# Patient Record
Sex: MALE | Race: WHITE | NOT HISPANIC OR LATINO | URBAN - METROPOLITAN AREA
[De-identification: names, ages, dates, MRNs, and addresses within clinical notes are randomized per-mention and may not be internally consistent; named-entity substitution may affect disease eponyms.]

---

## 2021-01-01 ENCOUNTER — EMERGENCY (EMERGENCY)
Facility: HOSPITAL | Age: 56
LOS: 1 days | Discharge: ANOTHER HOSPITAL | End: 2021-01-01
Attending: INTERNAL MEDICINE | Admitting: INTERNAL MEDICINE
Payer: COMMERCIAL

## 2021-01-01 ENCOUNTER — INPATIENT (INPATIENT)
Facility: HOSPITAL | Age: 56
LOS: 10 days | DRG: 64 | End: 2021-08-24
Attending: THORACIC SURGERY (CARDIOTHORACIC VASCULAR SURGERY) | Admitting: THORACIC SURGERY (CARDIOTHORACIC VASCULAR SURGERY)
Payer: COMMERCIAL

## 2021-01-01 VITALS
HEIGHT: 71 IN | RESPIRATION RATE: 17 BRPM | TEMPERATURE: 98 F | DIASTOLIC BLOOD PRESSURE: 229 MMHG | SYSTOLIC BLOOD PRESSURE: 126 MMHG | OXYGEN SATURATION: 99 % | WEIGHT: 171.52 LBS | HEART RATE: 66 BPM

## 2021-01-01 VITALS
SYSTOLIC BLOOD PRESSURE: 174 MMHG | DIASTOLIC BLOOD PRESSURE: 95 MMHG | TEMPERATURE: 99 F | HEART RATE: 71 BPM | OXYGEN SATURATION: 97 % | RESPIRATION RATE: 18 BRPM

## 2021-01-01 VITALS
HEART RATE: 59 BPM | DIASTOLIC BLOOD PRESSURE: 97 MMHG | SYSTOLIC BLOOD PRESSURE: 192 MMHG | RESPIRATION RATE: 17 BRPM | OXYGEN SATURATION: 100 %

## 2021-01-01 VITALS — HEART RATE: 32 BPM

## 2021-01-01 DIAGNOSIS — I71.00 DISSECTION OF UNSPECIFIED SITE OF AORTA: ICD-10-CM

## 2021-01-01 DIAGNOSIS — I61.9 NONTRAUMATIC INTRACEREBRAL HEMORRHAGE, UNSPECIFIED: ICD-10-CM

## 2021-01-01 LAB
-  AMPICILLIN/SULBACTAM: SIGNIFICANT CHANGE UP
-  CEFAZOLIN: SIGNIFICANT CHANGE UP
-  CLINDAMYCIN: SIGNIFICANT CHANGE UP
-  ERYTHROMYCIN: SIGNIFICANT CHANGE UP
-  GENTAMICIN: SIGNIFICANT CHANGE UP
-  OXACILLIN: SIGNIFICANT CHANGE UP
-  PENICILLIN: SIGNIFICANT CHANGE UP
-  RIFAMPIN: SIGNIFICANT CHANGE UP
-  STAPHYLOCOCCUS EPIDERMIDIS: SIGNIFICANT CHANGE UP
-  TETRACYCLINE: SIGNIFICANT CHANGE UP
-  TRIMETHOPRIM/SULFAMETHOXAZOLE: SIGNIFICANT CHANGE UP
-  VANCOMYCIN: SIGNIFICANT CHANGE UP
A1C WITH ESTIMATED AVERAGE GLUCOSE RESULT: 5.3 % — SIGNIFICANT CHANGE UP (ref 4–5.6)
ALBUMIN SERPL ELPH-MCNC: 2.4 G/DL — LOW (ref 3.3–5)
ALBUMIN SERPL ELPH-MCNC: 2.5 G/DL — LOW (ref 3.3–5)
ALBUMIN SERPL ELPH-MCNC: 2.6 G/DL — LOW (ref 3.3–5)
ALBUMIN SERPL ELPH-MCNC: 2.7 G/DL — LOW (ref 3.3–5)
ALBUMIN SERPL ELPH-MCNC: 2.8 G/DL — LOW (ref 3.3–5)
ALBUMIN SERPL ELPH-MCNC: 2.9 G/DL — LOW (ref 3.3–5)
ALBUMIN SERPL ELPH-MCNC: 3 G/DL — LOW (ref 3.3–5)
ALBUMIN SERPL ELPH-MCNC: 3.1 G/DL — LOW (ref 3.3–5)
ALBUMIN SERPL ELPH-MCNC: 3.1 G/DL — LOW (ref 3.3–5)
ALBUMIN SERPL ELPH-MCNC: 3.3 G/DL — SIGNIFICANT CHANGE UP (ref 3.3–5)
ALBUMIN SERPL ELPH-MCNC: 3.3 G/DL — SIGNIFICANT CHANGE UP (ref 3.3–5)
ALBUMIN SERPL ELPH-MCNC: 3.5 G/DL — SIGNIFICANT CHANGE UP (ref 3.3–5)
ALBUMIN SERPL ELPH-MCNC: 3.5 G/DL — SIGNIFICANT CHANGE UP (ref 3.3–5)
ALBUMIN SERPL ELPH-MCNC: 3.8 G/DL — SIGNIFICANT CHANGE UP (ref 3.3–5)
ALBUMIN SERPL ELPH-MCNC: 3.9 G/DL — SIGNIFICANT CHANGE UP (ref 3.3–5)
ALBUMIN SERPL ELPH-MCNC: 4.1 G/DL — SIGNIFICANT CHANGE UP (ref 3.3–5)
ALBUMIN SERPL ELPH-MCNC: 4.3 G/DL — SIGNIFICANT CHANGE UP (ref 3.3–5)
ALP SERPL-CCNC: 100 U/L — SIGNIFICANT CHANGE UP (ref 40–120)
ALP SERPL-CCNC: 101 U/L — SIGNIFICANT CHANGE UP (ref 40–120)
ALP SERPL-CCNC: 102 U/L — SIGNIFICANT CHANGE UP (ref 40–120)
ALP SERPL-CCNC: 106 U/L — SIGNIFICANT CHANGE UP (ref 40–120)
ALP SERPL-CCNC: 106 U/L — SIGNIFICANT CHANGE UP (ref 40–120)
ALP SERPL-CCNC: 107 U/L — SIGNIFICANT CHANGE UP (ref 40–120)
ALP SERPL-CCNC: 108 U/L — SIGNIFICANT CHANGE UP (ref 40–120)
ALP SERPL-CCNC: 113 U/L — SIGNIFICANT CHANGE UP (ref 40–120)
ALP SERPL-CCNC: 117 U/L — SIGNIFICANT CHANGE UP (ref 40–120)
ALP SERPL-CCNC: 126 U/L — HIGH (ref 40–120)
ALP SERPL-CCNC: 126 U/L — HIGH (ref 40–120)
ALP SERPL-CCNC: 137 U/L — HIGH (ref 40–120)
ALP SERPL-CCNC: 139 U/L — HIGH (ref 40–120)
ALP SERPL-CCNC: 141 U/L — HIGH (ref 40–120)
ALP SERPL-CCNC: 84 U/L — SIGNIFICANT CHANGE UP (ref 40–120)
ALP SERPL-CCNC: 89 U/L — SIGNIFICANT CHANGE UP (ref 40–120)
ALP SERPL-CCNC: 91 U/L — SIGNIFICANT CHANGE UP (ref 40–120)
ALP SERPL-CCNC: 92 U/L — SIGNIFICANT CHANGE UP (ref 40–120)
ALP SERPL-CCNC: 93 U/L — SIGNIFICANT CHANGE UP (ref 40–120)
ALP SERPL-CCNC: 94 U/L — SIGNIFICANT CHANGE UP (ref 40–120)
ALP SERPL-CCNC: 97 U/L — SIGNIFICANT CHANGE UP (ref 40–120)
ALP SERPL-CCNC: 98 U/L — SIGNIFICANT CHANGE UP (ref 40–120)
ALT FLD-CCNC: 14 U/L — SIGNIFICANT CHANGE UP (ref 10–45)
ALT FLD-CCNC: 15 U/L — SIGNIFICANT CHANGE UP (ref 10–45)
ALT FLD-CCNC: 15 U/L — SIGNIFICANT CHANGE UP (ref 10–45)
ALT FLD-CCNC: 16 U/L — SIGNIFICANT CHANGE UP (ref 10–45)
ALT FLD-CCNC: 17 U/L — SIGNIFICANT CHANGE UP (ref 10–45)
ALT FLD-CCNC: 17 U/L — SIGNIFICANT CHANGE UP (ref 10–45)
ALT FLD-CCNC: 18 U/L — SIGNIFICANT CHANGE UP (ref 10–45)
ALT FLD-CCNC: 19 U/L — SIGNIFICANT CHANGE UP (ref 10–45)
ALT FLD-CCNC: 22 U/L — SIGNIFICANT CHANGE UP (ref 10–45)
ALT FLD-CCNC: 31 U/L — SIGNIFICANT CHANGE UP (ref 10–45)
ALT FLD-CCNC: 34 U/L — SIGNIFICANT CHANGE UP (ref 10–45)
ALT FLD-CCNC: 35 U/L — SIGNIFICANT CHANGE UP (ref 10–45)
ALT FLD-CCNC: 43 U/L — SIGNIFICANT CHANGE UP (ref 10–45)
AMPHET UR-MCNC: POSITIVE
ANION GAP SERPL CALC-SCNC: 10 MMOL/L — SIGNIFICANT CHANGE UP (ref 5–17)
ANION GAP SERPL CALC-SCNC: 11 MMOL/L — SIGNIFICANT CHANGE UP (ref 5–17)
ANION GAP SERPL CALC-SCNC: 12 MMOL/L — SIGNIFICANT CHANGE UP (ref 5–17)
ANION GAP SERPL CALC-SCNC: 13 MMOL/L — SIGNIFICANT CHANGE UP (ref 5–17)
ANION GAP SERPL CALC-SCNC: 14 MMOL/L — SIGNIFICANT CHANGE UP (ref 5–17)
ANION GAP SERPL CALC-SCNC: 15 MMOL/L — SIGNIFICANT CHANGE UP (ref 5–17)
ANION GAP SERPL CALC-SCNC: 9 MMOL/L — SIGNIFICANT CHANGE UP (ref 5–17)
APPEARANCE UR: ABNORMAL
APPEARANCE UR: CLEAR — SIGNIFICANT CHANGE UP
APTT BLD: 28.5 SEC — SIGNIFICANT CHANGE UP (ref 27.5–35.5)
APTT BLD: 32 SEC — SIGNIFICANT CHANGE UP (ref 27.5–35.5)
APTT BLD: 35.6 SEC — HIGH (ref 27.5–35.5)
AST SERPL-CCNC: 13 U/L — SIGNIFICANT CHANGE UP (ref 10–40)
AST SERPL-CCNC: 15 U/L — SIGNIFICANT CHANGE UP (ref 10–40)
AST SERPL-CCNC: 16 U/L — SIGNIFICANT CHANGE UP (ref 10–40)
AST SERPL-CCNC: 17 U/L — SIGNIFICANT CHANGE UP (ref 10–40)
AST SERPL-CCNC: 20 U/L — SIGNIFICANT CHANGE UP (ref 10–40)
AST SERPL-CCNC: 20 U/L — SIGNIFICANT CHANGE UP (ref 10–40)
AST SERPL-CCNC: 21 U/L — SIGNIFICANT CHANGE UP (ref 10–40)
AST SERPL-CCNC: 22 U/L — SIGNIFICANT CHANGE UP (ref 10–40)
AST SERPL-CCNC: 23 U/L — SIGNIFICANT CHANGE UP (ref 10–40)
AST SERPL-CCNC: 23 U/L — SIGNIFICANT CHANGE UP (ref 10–40)
AST SERPL-CCNC: 24 U/L — SIGNIFICANT CHANGE UP (ref 10–40)
AST SERPL-CCNC: 25 U/L — SIGNIFICANT CHANGE UP (ref 10–40)
AST SERPL-CCNC: 26 U/L — SIGNIFICANT CHANGE UP (ref 10–40)
AST SERPL-CCNC: 26 U/L — SIGNIFICANT CHANGE UP (ref 10–40)
AST SERPL-CCNC: 27 U/L — SIGNIFICANT CHANGE UP (ref 10–40)
AST SERPL-CCNC: 28 U/L — SIGNIFICANT CHANGE UP (ref 10–40)
AST SERPL-CCNC: 30 U/L — SIGNIFICANT CHANGE UP (ref 10–40)
AST SERPL-CCNC: 31 U/L — SIGNIFICANT CHANGE UP (ref 10–40)
AST SERPL-CCNC: 35 U/L — SIGNIFICANT CHANGE UP (ref 10–40)
AST SERPL-CCNC: 37 U/L — SIGNIFICANT CHANGE UP (ref 10–40)
BACTERIA # UR AUTO: 0 — SIGNIFICANT CHANGE UP
BACTERIA # UR AUTO: NEGATIVE — SIGNIFICANT CHANGE UP
BARBITURATES UR SCN-MCNC: NEGATIVE — SIGNIFICANT CHANGE UP
BASE EXCESS BLDV CALC-SCNC: 2.9 MMOL/L — HIGH (ref -2–2)
BASE EXCESS BLDV CALC-SCNC: 3.3 MMOL/L — HIGH (ref -2–3)
BASOPHILS # BLD AUTO: 0.06 K/UL — SIGNIFICANT CHANGE UP (ref 0–0.2)
BASOPHILS # BLD AUTO: 0.07 K/UL — SIGNIFICANT CHANGE UP (ref 0–0.2)
BASOPHILS # BLD AUTO: 0.08 K/UL — SIGNIFICANT CHANGE UP (ref 0–0.2)
BASOPHILS # BLD AUTO: 0.08 K/UL — SIGNIFICANT CHANGE UP (ref 0–0.2)
BASOPHILS NFR BLD AUTO: 0.4 % — SIGNIFICANT CHANGE UP (ref 0–2)
BASOPHILS NFR BLD AUTO: 0.5 % — SIGNIFICANT CHANGE UP (ref 0–2)
BASOPHILS NFR BLD AUTO: 0.5 % — SIGNIFICANT CHANGE UP (ref 0–2)
BASOPHILS NFR BLD AUTO: 0.7 % — SIGNIFICANT CHANGE UP (ref 0–2)
BENZODIAZ UR-MCNC: NEGATIVE — SIGNIFICANT CHANGE UP
BILIRUB SERPL-MCNC: 0.4 MG/DL — SIGNIFICANT CHANGE UP (ref 0.2–1.2)
BILIRUB SERPL-MCNC: 0.5 MG/DL — SIGNIFICANT CHANGE UP (ref 0.2–1.2)
BILIRUB SERPL-MCNC: 0.6 MG/DL — SIGNIFICANT CHANGE UP (ref 0.2–1.2)
BILIRUB SERPL-MCNC: 0.7 MG/DL — SIGNIFICANT CHANGE UP (ref 0.2–1.2)
BILIRUB SERPL-MCNC: 0.8 MG/DL — SIGNIFICANT CHANGE UP (ref 0.2–1.2)
BILIRUB UR-MCNC: NEGATIVE — SIGNIFICANT CHANGE UP
BILIRUB UR-MCNC: NEGATIVE — SIGNIFICANT CHANGE UP
BLD GP AB SCN SERPL QL: NEGATIVE — SIGNIFICANT CHANGE UP
BLD GP AB SCN SERPL QL: NEGATIVE — SIGNIFICANT CHANGE UP
BUN SERPL-MCNC: 17 MG/DL — SIGNIFICANT CHANGE UP (ref 7–23)
BUN SERPL-MCNC: 18 MG/DL — SIGNIFICANT CHANGE UP (ref 7–23)
BUN SERPL-MCNC: 18 MG/DL — SIGNIFICANT CHANGE UP (ref 7–23)
BUN SERPL-MCNC: 19 MG/DL — SIGNIFICANT CHANGE UP (ref 7–23)
BUN SERPL-MCNC: 21 MG/DL — SIGNIFICANT CHANGE UP (ref 7–23)
BUN SERPL-MCNC: 24 MG/DL — HIGH (ref 7–23)
BUN SERPL-MCNC: 26 MG/DL — HIGH (ref 7–23)
BUN SERPL-MCNC: 27 MG/DL — HIGH (ref 7–23)
BUN SERPL-MCNC: 27 MG/DL — HIGH (ref 7–23)
BUN SERPL-MCNC: 29 MG/DL — HIGH (ref 7–23)
BUN SERPL-MCNC: 30 MG/DL — HIGH (ref 7–23)
BUN SERPL-MCNC: 35 MG/DL — HIGH (ref 7–23)
BUN SERPL-MCNC: 36 MG/DL — HIGH (ref 7–23)
BUN SERPL-MCNC: 36 MG/DL — HIGH (ref 7–23)
BUN SERPL-MCNC: 38 MG/DL — HIGH (ref 7–23)
BUN SERPL-MCNC: 39 MG/DL — HIGH (ref 7–23)
BUN SERPL-MCNC: 40 MG/DL — HIGH (ref 7–23)
BUN SERPL-MCNC: 40 MG/DL — HIGH (ref 7–23)
BUN SERPL-MCNC: 41 MG/DL — HIGH (ref 7–23)
BUN SERPL-MCNC: 42 MG/DL — HIGH (ref 7–23)
CA-I SERPL-SCNC: 1.14 MMOL/L — SIGNIFICANT CHANGE UP (ref 1.12–1.3)
CALCIUM SERPL-MCNC: 8.7 MG/DL — SIGNIFICANT CHANGE UP (ref 8.4–10.5)
CALCIUM SERPL-MCNC: 8.7 MG/DL — SIGNIFICANT CHANGE UP (ref 8.4–10.5)
CALCIUM SERPL-MCNC: 8.8 MG/DL — SIGNIFICANT CHANGE UP (ref 8.4–10.5)
CALCIUM SERPL-MCNC: 8.9 MG/DL — SIGNIFICANT CHANGE UP (ref 8.4–10.5)
CALCIUM SERPL-MCNC: 9 MG/DL — SIGNIFICANT CHANGE UP (ref 8.4–10.5)
CALCIUM SERPL-MCNC: 9 MG/DL — SIGNIFICANT CHANGE UP (ref 8.4–10.5)
CALCIUM SERPL-MCNC: 9.1 MG/DL — SIGNIFICANT CHANGE UP (ref 8.4–10.5)
CALCIUM SERPL-MCNC: 9.2 MG/DL — SIGNIFICANT CHANGE UP (ref 8.4–10.5)
CALCIUM SERPL-MCNC: 9.3 MG/DL — SIGNIFICANT CHANGE UP (ref 8.4–10.5)
CALCIUM SERPL-MCNC: 9.4 MG/DL — SIGNIFICANT CHANGE UP (ref 8.4–10.5)
CALCIUM SERPL-MCNC: 9.4 MG/DL — SIGNIFICANT CHANGE UP (ref 8.4–10.5)
CHLORIDE BLDV-SCNC: 104 MMOL/L — SIGNIFICANT CHANGE UP (ref 96–108)
CHLORIDE SERPL-SCNC: 102 MMOL/L — SIGNIFICANT CHANGE UP (ref 96–108)
CHLORIDE SERPL-SCNC: 104 MMOL/L — SIGNIFICANT CHANGE UP (ref 96–108)
CHLORIDE SERPL-SCNC: 104 MMOL/L — SIGNIFICANT CHANGE UP (ref 96–108)
CHLORIDE SERPL-SCNC: 105 MMOL/L — SIGNIFICANT CHANGE UP (ref 96–108)
CHLORIDE SERPL-SCNC: 107 MMOL/L — SIGNIFICANT CHANGE UP (ref 96–108)
CHLORIDE SERPL-SCNC: 109 MMOL/L — HIGH (ref 96–108)
CHLORIDE SERPL-SCNC: 112 MMOL/L — HIGH (ref 96–108)
CHLORIDE SERPL-SCNC: 114 MMOL/L — HIGH (ref 96–108)
CHLORIDE SERPL-SCNC: 114 MMOL/L — HIGH (ref 96–108)
CHLORIDE SERPL-SCNC: 116 MMOL/L — HIGH (ref 96–108)
CHLORIDE SERPL-SCNC: 117 MMOL/L — HIGH (ref 96–108)
CHLORIDE SERPL-SCNC: 117 MMOL/L — HIGH (ref 96–108)
CHLORIDE SERPL-SCNC: 119 MMOL/L — HIGH (ref 96–108)
CHLORIDE SERPL-SCNC: 120 MMOL/L — HIGH (ref 96–108)
CHLORIDE SERPL-SCNC: 122 MMOL/L — HIGH (ref 96–108)
CHLORIDE SERPL-SCNC: 122 MMOL/L — HIGH (ref 96–108)
CHLORIDE SERPL-SCNC: 124 MMOL/L — HIGH (ref 96–108)
CHOLEST SERPL-MCNC: 169 MG/DL — SIGNIFICANT CHANGE UP
CK MB BLD-MCNC: 2.7 % — SIGNIFICANT CHANGE UP (ref 0–3.5)
CK MB CFR SERPL CALC: 15.1 NG/ML — HIGH (ref 0–6.7)
CK SERPL-CCNC: 556 U/L — HIGH (ref 30–200)
CLOSURE TME COLL+EPINEP BLD: 189 K/UL — SIGNIFICANT CHANGE UP (ref 150–400)
CO2 BLDV-SCNC: 29 MMOL/L — HIGH (ref 22–26)
CO2 BLDV-SCNC: 30 MMOL/L — SIGNIFICANT CHANGE UP (ref 22–30)
CO2 SERPL-SCNC: 17 MMOL/L — LOW (ref 22–31)
CO2 SERPL-SCNC: 18 MMOL/L — LOW (ref 22–31)
CO2 SERPL-SCNC: 19 MMOL/L — LOW (ref 22–31)
CO2 SERPL-SCNC: 20 MMOL/L — LOW (ref 22–31)
CO2 SERPL-SCNC: 21 MMOL/L — LOW (ref 22–31)
CO2 SERPL-SCNC: 23 MMOL/L — SIGNIFICANT CHANGE UP (ref 22–31)
CO2 SERPL-SCNC: 24 MMOL/L — SIGNIFICANT CHANGE UP (ref 22–31)
CO2 SERPL-SCNC: 27 MMOL/L — SIGNIFICANT CHANGE UP (ref 22–31)
COCAINE METAB.OTHER UR-MCNC: NEGATIVE — SIGNIFICANT CHANGE UP
COLOR SPEC: SIGNIFICANT CHANGE UP
COLOR SPEC: YELLOW — SIGNIFICANT CHANGE UP
CREAT ?TM UR-MCNC: 50 MG/DL — SIGNIFICANT CHANGE UP
CREAT SERPL-MCNC: 1.4 MG/DL — HIGH (ref 0.5–1.3)
CREAT SERPL-MCNC: 1.4 MG/DL — HIGH (ref 0.5–1.3)
CREAT SERPL-MCNC: 1.45 MG/DL — HIGH (ref 0.5–1.3)
CREAT SERPL-MCNC: 1.47 MG/DL — HIGH (ref 0.5–1.3)
CREAT SERPL-MCNC: 1.53 MG/DL — HIGH (ref 0.5–1.3)
CREAT SERPL-MCNC: 1.57 MG/DL — HIGH (ref 0.5–1.3)
CREAT SERPL-MCNC: 1.59 MG/DL — HIGH (ref 0.5–1.3)
CREAT SERPL-MCNC: 1.6 MG/DL — HIGH (ref 0.5–1.3)
CREAT SERPL-MCNC: 1.67 MG/DL — HIGH (ref 0.5–1.3)
CREAT SERPL-MCNC: 1.68 MG/DL — HIGH (ref 0.5–1.3)
CREAT SERPL-MCNC: 1.68 MG/DL — HIGH (ref 0.5–1.3)
CREAT SERPL-MCNC: 1.71 MG/DL — HIGH (ref 0.5–1.3)
CREAT SERPL-MCNC: 1.73 MG/DL — HIGH (ref 0.5–1.3)
CREAT SERPL-MCNC: 1.75 MG/DL — HIGH (ref 0.5–1.3)
CREAT SERPL-MCNC: 1.81 MG/DL — HIGH (ref 0.5–1.3)
CREAT SERPL-MCNC: 1.81 MG/DL — HIGH (ref 0.5–1.3)
CREAT SERPL-MCNC: 1.89 MG/DL — HIGH (ref 0.5–1.3)
CREAT SERPL-MCNC: 1.97 MG/DL — HIGH (ref 0.5–1.3)
CREAT SERPL-MCNC: 2.02 MG/DL — HIGH (ref 0.5–1.3)
CREAT SERPL-MCNC: 2.22 MG/DL — HIGH (ref 0.5–1.3)
CREAT SERPL-MCNC: 2.23 MG/DL — HIGH (ref 0.5–1.3)
CREAT SERPL-MCNC: 2.24 MG/DL — HIGH (ref 0.5–1.3)
CREAT SERPL-MCNC: 2.29 MG/DL — HIGH (ref 0.5–1.3)
CREAT SERPL-MCNC: 2.76 MG/DL — HIGH (ref 0.5–1.3)
CULTURE RESULTS: SIGNIFICANT CHANGE UP
DIFF PNL FLD: ABNORMAL
DIFF PNL FLD: NEGATIVE — SIGNIFICANT CHANGE UP
DRUG SCREEN, SERUM: SIGNIFICANT CHANGE UP
EOSINOPHIL # BLD AUTO: 0.05 K/UL — SIGNIFICANT CHANGE UP (ref 0–0.5)
EOSINOPHIL # BLD AUTO: 0.06 K/UL — SIGNIFICANT CHANGE UP (ref 0–0.5)
EOSINOPHIL # BLD AUTO: 0.21 K/UL — SIGNIFICANT CHANGE UP (ref 0–0.5)
EOSINOPHIL # BLD AUTO: 0.22 K/UL — SIGNIFICANT CHANGE UP (ref 0–0.5)
EOSINOPHIL NFR BLD AUTO: 0.2 % — SIGNIFICANT CHANGE UP (ref 0–6)
EOSINOPHIL NFR BLD AUTO: 0.5 % — SIGNIFICANT CHANGE UP (ref 0–6)
EOSINOPHIL NFR BLD AUTO: 1.5 % — SIGNIFICANT CHANGE UP (ref 0–6)
EOSINOPHIL NFR BLD AUTO: 1.8 % — SIGNIFICANT CHANGE UP (ref 0–6)
EPI CELLS # UR: 0 /HPF — SIGNIFICANT CHANGE UP
EPI CELLS # UR: 10 /HPF — HIGH
ESTIMATED AVERAGE GLUCOSE: 105 MG/DL — SIGNIFICANT CHANGE UP (ref 68–114)
ETHANOL SERPL-MCNC: <3 MG/DL — SIGNIFICANT CHANGE UP (ref 0–3)
FIBRINOGEN PPP-MCNC: 575 MG/DL — HIGH (ref 290–520)
GAS PNL BLDA: SIGNIFICANT CHANGE UP
GAS PNL BLDV: 137 MMOL/L — SIGNIFICANT CHANGE UP (ref 135–145)
GAS PNL BLDV: SIGNIFICANT CHANGE UP
GAS PNL BLDV: SIGNIFICANT CHANGE UP
GLUCOSE BLDC GLUCOMTR-MCNC: 122 MG/DL — HIGH (ref 70–99)
GLUCOSE BLDV-MCNC: 119 MG/DL — HIGH (ref 70–99)
GLUCOSE SERPL-MCNC: 103 MG/DL — HIGH (ref 70–99)
GLUCOSE SERPL-MCNC: 118 MG/DL — HIGH (ref 70–99)
GLUCOSE SERPL-MCNC: 118 MG/DL — HIGH (ref 70–99)
GLUCOSE SERPL-MCNC: 122 MG/DL — HIGH (ref 70–99)
GLUCOSE SERPL-MCNC: 124 MG/DL — HIGH (ref 70–99)
GLUCOSE SERPL-MCNC: 124 MG/DL — HIGH (ref 70–99)
GLUCOSE SERPL-MCNC: 127 MG/DL — HIGH (ref 70–99)
GLUCOSE SERPL-MCNC: 127 MG/DL — HIGH (ref 70–99)
GLUCOSE SERPL-MCNC: 128 MG/DL — HIGH (ref 70–99)
GLUCOSE SERPL-MCNC: 133 MG/DL — HIGH (ref 70–99)
GLUCOSE SERPL-MCNC: 134 MG/DL — HIGH (ref 70–99)
GLUCOSE SERPL-MCNC: 138 MG/DL — HIGH (ref 70–99)
GLUCOSE SERPL-MCNC: 140 MG/DL — HIGH (ref 70–99)
GLUCOSE SERPL-MCNC: 142 MG/DL — HIGH (ref 70–99)
GLUCOSE SERPL-MCNC: 142 MG/DL — HIGH (ref 70–99)
GLUCOSE SERPL-MCNC: 144 MG/DL — HIGH (ref 70–99)
GLUCOSE SERPL-MCNC: 144 MG/DL — HIGH (ref 70–99)
GLUCOSE SERPL-MCNC: 151 MG/DL — HIGH (ref 70–99)
GLUCOSE SERPL-MCNC: 152 MG/DL — HIGH (ref 70–99)
GLUCOSE SERPL-MCNC: 158 MG/DL — HIGH (ref 70–99)
GLUCOSE SERPL-MCNC: 172 MG/DL — HIGH (ref 70–99)
GLUCOSE UR QL: ABNORMAL
GLUCOSE UR QL: NEGATIVE — SIGNIFICANT CHANGE UP
GRAM STN FLD: SIGNIFICANT CHANGE UP
HCO3 BLDV-SCNC: 28 MMOL/L — SIGNIFICANT CHANGE UP (ref 21–29)
HCO3 BLDV-SCNC: 28 MMOL/L — SIGNIFICANT CHANGE UP (ref 22–29)
HCT VFR BLD CALC: 30.6 % — LOW (ref 39–50)
HCT VFR BLD CALC: 31.1 % — LOW (ref 39–50)
HCT VFR BLD CALC: 31.4 % — LOW (ref 39–50)
HCT VFR BLD CALC: 31.5 % — LOW (ref 39–50)
HCT VFR BLD CALC: 32.5 % — LOW (ref 39–50)
HCT VFR BLD CALC: 33.6 % — LOW (ref 39–50)
HCT VFR BLD CALC: 34.5 % — LOW (ref 39–50)
HCT VFR BLD CALC: 37.2 % — LOW (ref 39–50)
HCT VFR BLD CALC: 37.4 % — LOW (ref 39–50)
HCT VFR BLD CALC: 37.4 % — LOW (ref 39–50)
HCT VFR BLD CALC: 39.3 % — SIGNIFICANT CHANGE UP (ref 39–50)
HCT VFR BLD CALC: 41.2 % — SIGNIFICANT CHANGE UP (ref 39–50)
HCT VFR BLD CALC: 42.1 % — SIGNIFICANT CHANGE UP (ref 39–50)
HCT VFR BLD CALC: 42.8 % — SIGNIFICANT CHANGE UP (ref 39–50)
HCT VFR BLDA CALC: 45 % — SIGNIFICANT CHANGE UP (ref 39–50)
HDLC SERPL-MCNC: 89 MG/DL — SIGNIFICANT CHANGE UP
HEPARINASE TEG R TIME: 5.9 MIN — SIGNIFICANT CHANGE UP (ref 4.3–8.3)
HGB BLD CALC-MCNC: 14.7 G/DL — SIGNIFICANT CHANGE UP (ref 13–17)
HGB BLD-MCNC: 10 G/DL — LOW (ref 13–17)
HGB BLD-MCNC: 11 G/DL — LOW (ref 13–17)
HGB BLD-MCNC: 11.4 G/DL — LOW (ref 13–17)
HGB BLD-MCNC: 12.2 G/DL — LOW (ref 13–17)
HGB BLD-MCNC: 12.5 G/DL — LOW (ref 13–17)
HGB BLD-MCNC: 12.8 G/DL — LOW (ref 13–17)
HGB BLD-MCNC: 13.5 G/DL — SIGNIFICANT CHANGE UP (ref 13–17)
HGB BLD-MCNC: 14.2 G/DL — SIGNIFICANT CHANGE UP (ref 13–17)
HGB BLD-MCNC: 14.6 G/DL — SIGNIFICANT CHANGE UP (ref 13–17)
HGB BLD-MCNC: 14.7 G/DL — SIGNIFICANT CHANGE UP (ref 13–17)
HGB BLD-MCNC: 9.4 G/DL — LOW (ref 13–17)
HGB BLD-MCNC: 9.7 G/DL — LOW (ref 13–17)
HGB BLD-MCNC: 9.7 G/DL — LOW (ref 13–17)
HGB BLD-MCNC: 9.8 G/DL — LOW (ref 13–17)
HYALINE CASTS # UR AUTO: 0 /LPF — SIGNIFICANT CHANGE UP (ref 0–2)
HYALINE CASTS # UR AUTO: 106 /LPF — HIGH (ref 0–2)
IMM GRANULOCYTES NFR BLD AUTO: 0.2 % — SIGNIFICANT CHANGE UP (ref 0–1.5)
IMM GRANULOCYTES NFR BLD AUTO: 0.3 % — SIGNIFICANT CHANGE UP (ref 0–1.5)
IMM GRANULOCYTES NFR BLD AUTO: 0.5 % — SIGNIFICANT CHANGE UP (ref 0–1.5)
IMM GRANULOCYTES NFR BLD AUTO: 1 % — SIGNIFICANT CHANGE UP (ref 0–1.5)
INR BLD: 0.9 RATIO — SIGNIFICANT CHANGE UP (ref 0.88–1.16)
INR BLD: 1.04 RATIO — SIGNIFICANT CHANGE UP (ref 0.88–1.16)
INR BLD: 1.1 RATIO — SIGNIFICANT CHANGE UP (ref 0.88–1.16)
KETONES UR-MCNC: NEGATIVE — SIGNIFICANT CHANGE UP
KETONES UR-MCNC: NEGATIVE — SIGNIFICANT CHANGE UP
LACTATE BLDV-MCNC: 1.3 MMOL/L — SIGNIFICANT CHANGE UP (ref 0.7–2)
LEUKOCYTE ESTERASE UR-ACNC: ABNORMAL
LEUKOCYTE ESTERASE UR-ACNC: NEGATIVE — SIGNIFICANT CHANGE UP
LIPID PNL WITH DIRECT LDL SERPL: 68 MG/DL — SIGNIFICANT CHANGE UP
LYMPHOCYTES # BLD AUTO: 1 K/UL — SIGNIFICANT CHANGE UP (ref 1–3.3)
LYMPHOCYTES # BLD AUTO: 1.37 K/UL — SIGNIFICANT CHANGE UP (ref 1–3.3)
LYMPHOCYTES # BLD AUTO: 1.79 K/UL — SIGNIFICANT CHANGE UP (ref 1–3.3)
LYMPHOCYTES # BLD AUTO: 10.3 % — LOW (ref 13–44)
LYMPHOCYTES # BLD AUTO: 12.4 % — LOW (ref 13–44)
LYMPHOCYTES # BLD AUTO: 19.4 % — SIGNIFICANT CHANGE UP (ref 13–44)
LYMPHOCYTES # BLD AUTO: 2.28 K/UL — SIGNIFICANT CHANGE UP (ref 1–3.3)
LYMPHOCYTES # BLD AUTO: 4.5 % — LOW (ref 13–44)
MAGNESIUM SERPL-MCNC: 2.1 MG/DL — SIGNIFICANT CHANGE UP (ref 1.6–2.6)
MAGNESIUM SERPL-MCNC: 2.2 MG/DL — SIGNIFICANT CHANGE UP (ref 1.6–2.6)
MAGNESIUM SERPL-MCNC: 2.2 MG/DL — SIGNIFICANT CHANGE UP (ref 1.6–2.6)
MAGNESIUM SERPL-MCNC: 2.4 MG/DL — SIGNIFICANT CHANGE UP (ref 1.6–2.6)
MAGNESIUM SERPL-MCNC: 2.5 MG/DL — SIGNIFICANT CHANGE UP (ref 1.6–2.6)
MAGNESIUM SERPL-MCNC: 2.6 MG/DL — SIGNIFICANT CHANGE UP (ref 1.6–2.6)
MAGNESIUM SERPL-MCNC: 2.7 MG/DL — HIGH (ref 1.6–2.6)
MAGNESIUM SERPL-MCNC: 2.8 MG/DL — HIGH (ref 1.6–2.6)
MCHC RBC-ENTMCNC: 30 PG — SIGNIFICANT CHANGE UP (ref 27–34)
MCHC RBC-ENTMCNC: 30.1 PG — SIGNIFICANT CHANGE UP (ref 27–34)
MCHC RBC-ENTMCNC: 30.2 PG — SIGNIFICANT CHANGE UP (ref 27–34)
MCHC RBC-ENTMCNC: 30.2 PG — SIGNIFICANT CHANGE UP (ref 27–34)
MCHC RBC-ENTMCNC: 30.3 PG — SIGNIFICANT CHANGE UP (ref 27–34)
MCHC RBC-ENTMCNC: 30.3 PG — SIGNIFICANT CHANGE UP (ref 27–34)
MCHC RBC-ENTMCNC: 30.5 PG — SIGNIFICANT CHANGE UP (ref 27–34)
MCHC RBC-ENTMCNC: 30.7 GM/DL — LOW (ref 32–36)
MCHC RBC-ENTMCNC: 30.7 PG — SIGNIFICANT CHANGE UP (ref 27–34)
MCHC RBC-ENTMCNC: 30.8 GM/DL — LOW (ref 32–36)
MCHC RBC-ENTMCNC: 30.8 PG — SIGNIFICANT CHANGE UP (ref 27–34)
MCHC RBC-ENTMCNC: 30.9 GM/DL — LOW (ref 32–36)
MCHC RBC-ENTMCNC: 31 PG — SIGNIFICANT CHANGE UP (ref 27–34)
MCHC RBC-ENTMCNC: 31.1 GM/DL — LOW (ref 32–36)
MCHC RBC-ENTMCNC: 31.2 GM/DL — LOW (ref 32–36)
MCHC RBC-ENTMCNC: 31.2 PG — SIGNIFICANT CHANGE UP (ref 27–34)
MCHC RBC-ENTMCNC: 31.2 PG — SIGNIFICANT CHANGE UP (ref 27–34)
MCHC RBC-ENTMCNC: 31.3 PG — SIGNIFICANT CHANGE UP (ref 27–34)
MCHC RBC-ENTMCNC: 31.4 PG — SIGNIFICANT CHANGE UP (ref 27–34)
MCHC RBC-ENTMCNC: 32.7 GM/DL — SIGNIFICANT CHANGE UP (ref 32–36)
MCHC RBC-ENTMCNC: 32.8 GM/DL — SIGNIFICANT CHANGE UP (ref 32–36)
MCHC RBC-ENTMCNC: 33 GM/DL — SIGNIFICANT CHANGE UP (ref 32–36)
MCHC RBC-ENTMCNC: 33.4 GM/DL — SIGNIFICANT CHANGE UP (ref 32–36)
MCHC RBC-ENTMCNC: 34.1 GM/DL — SIGNIFICANT CHANGE UP (ref 32–36)
MCHC RBC-ENTMCNC: 34.2 GM/DL — SIGNIFICANT CHANGE UP (ref 32–36)
MCHC RBC-ENTMCNC: 34.4 GM/DL — SIGNIFICANT CHANGE UP (ref 32–36)
MCHC RBC-ENTMCNC: 34.5 GM/DL — SIGNIFICANT CHANGE UP (ref 32–36)
MCHC RBC-ENTMCNC: 34.9 GM/DL — SIGNIFICANT CHANGE UP (ref 32–36)
MCV RBC AUTO: 89.2 FL — SIGNIFICANT CHANGE UP (ref 80–100)
MCV RBC AUTO: 89.6 FL — SIGNIFICANT CHANGE UP (ref 80–100)
MCV RBC AUTO: 89.7 FL — SIGNIFICANT CHANGE UP (ref 80–100)
MCV RBC AUTO: 90.3 FL — SIGNIFICANT CHANGE UP (ref 80–100)
MCV RBC AUTO: 90.9 FL — SIGNIFICANT CHANGE UP (ref 80–100)
MCV RBC AUTO: 91.9 FL — SIGNIFICANT CHANGE UP (ref 80–100)
MCV RBC AUTO: 92.3 FL — SIGNIFICANT CHANGE UP (ref 80–100)
MCV RBC AUTO: 94.5 FL — SIGNIFICANT CHANGE UP (ref 80–100)
MCV RBC AUTO: 95.2 FL — SIGNIFICANT CHANGE UP (ref 80–100)
MCV RBC AUTO: 97.2 FL — SIGNIFICANT CHANGE UP (ref 80–100)
MCV RBC AUTO: 97.2 FL — SIGNIFICANT CHANGE UP (ref 80–100)
MCV RBC AUTO: 97.9 FL — SIGNIFICANT CHANGE UP (ref 80–100)
MCV RBC AUTO: 98.1 FL — SIGNIFICANT CHANGE UP (ref 80–100)
MCV RBC AUTO: 98.4 FL — SIGNIFICANT CHANGE UP (ref 80–100)
METHADONE UR-MCNC: POSITIVE
METHOD TYPE: SIGNIFICANT CHANGE UP
METHOD TYPE: SIGNIFICANT CHANGE UP
MONOCYTES # BLD AUTO: 0.86 K/UL — SIGNIFICANT CHANGE UP (ref 0–0.9)
MONOCYTES # BLD AUTO: 0.87 K/UL — SIGNIFICANT CHANGE UP (ref 0–0.9)
MONOCYTES # BLD AUTO: 1.13 K/UL — HIGH (ref 0–0.9)
MONOCYTES # BLD AUTO: 1.32 K/UL — HIGH (ref 0–0.9)
MONOCYTES NFR BLD AUTO: 5.9 % — SIGNIFICANT CHANGE UP (ref 2–14)
MONOCYTES NFR BLD AUTO: 6.5 % — SIGNIFICANT CHANGE UP (ref 2–14)
MONOCYTES NFR BLD AUTO: 7.3 % — SIGNIFICANT CHANGE UP (ref 2–14)
MONOCYTES NFR BLD AUTO: 7.8 % — SIGNIFICANT CHANGE UP (ref 2–14)
NEUTROPHILS # BLD AUTO: 10.94 K/UL — HIGH (ref 1.8–7.4)
NEUTROPHILS # BLD AUTO: 11.12 K/UL — HIGH (ref 1.8–7.4)
NEUTROPHILS # BLD AUTO: 19.54 K/UL — HIGH (ref 1.8–7.4)
NEUTROPHILS # BLD AUTO: 8.28 K/UL — HIGH (ref 1.8–7.4)
NEUTROPHILS NFR BLD AUTO: 70.5 % — SIGNIFICANT CHANGE UP (ref 43–77)
NEUTROPHILS NFR BLD AUTO: 77.3 % — HIGH (ref 43–77)
NEUTROPHILS NFR BLD AUTO: 82 % — HIGH (ref 43–77)
NEUTROPHILS NFR BLD AUTO: 88 % — HIGH (ref 43–77)
NIGHT BLUE STAIN TISS: SIGNIFICANT CHANGE UP
NITRITE UR-MCNC: NEGATIVE — SIGNIFICANT CHANGE UP
NITRITE UR-MCNC: NEGATIVE — SIGNIFICANT CHANGE UP
NON HDL CHOLESTEROL: 80 MG/DL — SIGNIFICANT CHANGE UP
NRBC # BLD: 0 /100 WBCS — SIGNIFICANT CHANGE UP (ref 0–0)
NT-PROBNP SERPL-SCNC: 1580 PG/ML — HIGH (ref 0–300)
OPIATES UR-MCNC: NEGATIVE — SIGNIFICANT CHANGE UP
ORGANISM # SPEC MICROSCOPIC CNT: SIGNIFICANT CHANGE UP
OSMOLALITY SERPL: 319 MOSMOL/KG — HIGH (ref 275–300)
OSMOLALITY UR: 590 MOS/KG — SIGNIFICANT CHANGE UP (ref 300–900)
OXYCODONE UR-MCNC: NEGATIVE — SIGNIFICANT CHANGE UP
PCO2 BLDV: 44 MMHG — SIGNIFICANT CHANGE UP (ref 42–55)
PCO2 BLDV: 49 MMHG — SIGNIFICANT CHANGE UP (ref 35–50)
PCP SPEC-MCNC: SIGNIFICANT CHANGE UP
PCP UR-MCNC: NEGATIVE — SIGNIFICANT CHANGE UP
PH BLDV: 7.38 — SIGNIFICANT CHANGE UP (ref 7.35–7.45)
PH BLDV: 7.41 — SIGNIFICANT CHANGE UP (ref 7.32–7.43)
PH UR: 5.5 — SIGNIFICANT CHANGE UP (ref 5–8)
PH UR: 5.5 — SIGNIFICANT CHANGE UP (ref 5–8)
PH UR: 7 — SIGNIFICANT CHANGE UP (ref 5–8)
PHOSPHATE SERPL-MCNC: 2.4 MG/DL — LOW (ref 2.5–4.5)
PHOSPHATE SERPL-MCNC: 2.4 MG/DL — LOW (ref 2.5–4.5)
PHOSPHATE SERPL-MCNC: 2.9 MG/DL — SIGNIFICANT CHANGE UP (ref 2.5–4.5)
PHOSPHATE SERPL-MCNC: 3.2 MG/DL — SIGNIFICANT CHANGE UP (ref 2.5–4.5)
PHOSPHATE SERPL-MCNC: 3.2 MG/DL — SIGNIFICANT CHANGE UP (ref 2.5–4.5)
PHOSPHATE SERPL-MCNC: 3.3 MG/DL — SIGNIFICANT CHANGE UP (ref 2.5–4.5)
PHOSPHATE SERPL-MCNC: 3.3 MG/DL — SIGNIFICANT CHANGE UP (ref 2.5–4.5)
PHOSPHATE SERPL-MCNC: 3.4 MG/DL — SIGNIFICANT CHANGE UP (ref 2.5–4.5)
PHOSPHATE SERPL-MCNC: 3.5 MG/DL — SIGNIFICANT CHANGE UP (ref 2.5–4.5)
PHOSPHATE SERPL-MCNC: 3.7 MG/DL — SIGNIFICANT CHANGE UP (ref 2.5–4.5)
PHOSPHATE SERPL-MCNC: 3.7 MG/DL — SIGNIFICANT CHANGE UP (ref 2.5–4.5)
PHOSPHATE SERPL-MCNC: 3.9 MG/DL — SIGNIFICANT CHANGE UP (ref 2.5–4.5)
PLATELET # BLD AUTO: 239 K/UL — SIGNIFICANT CHANGE UP (ref 150–400)
PLATELET # BLD AUTO: 242 K/UL — SIGNIFICANT CHANGE UP (ref 150–400)
PLATELET # BLD AUTO: 246 K/UL — SIGNIFICANT CHANGE UP (ref 150–400)
PLATELET # BLD AUTO: 253 K/UL — SIGNIFICANT CHANGE UP (ref 150–400)
PLATELET # BLD AUTO: 269 K/UL — SIGNIFICANT CHANGE UP (ref 150–400)
PLATELET # BLD AUTO: 284 K/UL — SIGNIFICANT CHANGE UP (ref 150–400)
PLATELET # BLD AUTO: 285 K/UL — SIGNIFICANT CHANGE UP (ref 150–400)
PLATELET # BLD AUTO: 289 K/UL — SIGNIFICANT CHANGE UP (ref 150–400)
PLATELET # BLD AUTO: 302 K/UL — SIGNIFICANT CHANGE UP (ref 150–400)
PLATELET # BLD AUTO: 304 K/UL — SIGNIFICANT CHANGE UP (ref 150–400)
PLATELET # BLD AUTO: 312 K/UL — SIGNIFICANT CHANGE UP (ref 150–400)
PLATELET # BLD AUTO: 315 K/UL — SIGNIFICANT CHANGE UP (ref 150–400)
PLATELET # BLD AUTO: 318 K/UL — SIGNIFICANT CHANGE UP (ref 150–400)
PLATELET # BLD AUTO: SIGNIFICANT CHANGE UP (ref 150–400)
PLATELET RESPONSE ASPIRIN RESULT: 429 ARU — SIGNIFICANT CHANGE UP (ref 350–700)
PO2 BLDV: 40 MMHG — SIGNIFICANT CHANGE UP (ref 25–45)
PO2 BLDV: 56 MMHG — HIGH (ref 25–45)
POTASSIUM BLDV-SCNC: 3.7 MMOL/L — SIGNIFICANT CHANGE UP (ref 3.5–5.3)
POTASSIUM SERPL-MCNC: 3.5 MMOL/L — SIGNIFICANT CHANGE UP (ref 3.5–5.3)
POTASSIUM SERPL-MCNC: 3.8 MMOL/L — SIGNIFICANT CHANGE UP (ref 3.5–5.3)
POTASSIUM SERPL-MCNC: 3.9 MMOL/L — SIGNIFICANT CHANGE UP (ref 3.5–5.3)
POTASSIUM SERPL-MCNC: 3.9 MMOL/L — SIGNIFICANT CHANGE UP (ref 3.5–5.3)
POTASSIUM SERPL-MCNC: 4 MMOL/L — SIGNIFICANT CHANGE UP (ref 3.5–5.3)
POTASSIUM SERPL-MCNC: 4.1 MMOL/L — SIGNIFICANT CHANGE UP (ref 3.5–5.3)
POTASSIUM SERPL-MCNC: 4.1 MMOL/L — SIGNIFICANT CHANGE UP (ref 3.5–5.3)
POTASSIUM SERPL-MCNC: 4.2 MMOL/L — SIGNIFICANT CHANGE UP (ref 3.5–5.3)
POTASSIUM SERPL-MCNC: 4.3 MMOL/L — SIGNIFICANT CHANGE UP (ref 3.5–5.3)
POTASSIUM SERPL-MCNC: 4.4 MMOL/L — SIGNIFICANT CHANGE UP (ref 3.5–5.3)
POTASSIUM SERPL-SCNC: 3.5 MMOL/L — SIGNIFICANT CHANGE UP (ref 3.5–5.3)
POTASSIUM SERPL-SCNC: 3.8 MMOL/L — SIGNIFICANT CHANGE UP (ref 3.5–5.3)
POTASSIUM SERPL-SCNC: 3.9 MMOL/L — SIGNIFICANT CHANGE UP (ref 3.5–5.3)
POTASSIUM SERPL-SCNC: 3.9 MMOL/L — SIGNIFICANT CHANGE UP (ref 3.5–5.3)
POTASSIUM SERPL-SCNC: 4 MMOL/L — SIGNIFICANT CHANGE UP (ref 3.5–5.3)
POTASSIUM SERPL-SCNC: 4.1 MMOL/L — SIGNIFICANT CHANGE UP (ref 3.5–5.3)
POTASSIUM SERPL-SCNC: 4.1 MMOL/L — SIGNIFICANT CHANGE UP (ref 3.5–5.3)
POTASSIUM SERPL-SCNC: 4.2 MMOL/L — SIGNIFICANT CHANGE UP (ref 3.5–5.3)
POTASSIUM SERPL-SCNC: 4.3 MMOL/L — SIGNIFICANT CHANGE UP (ref 3.5–5.3)
POTASSIUM SERPL-SCNC: 4.4 MMOL/L — SIGNIFICANT CHANGE UP (ref 3.5–5.3)
PROCALCITONIN SERPL-MCNC: 0.39 NG/ML — HIGH (ref 0.02–0.1)
PROT SERPL-MCNC: 5.8 G/DL — LOW (ref 6–8.3)
PROT SERPL-MCNC: 5.9 G/DL — LOW (ref 6–8.3)
PROT SERPL-MCNC: 6 G/DL — SIGNIFICANT CHANGE UP (ref 6–8.3)
PROT SERPL-MCNC: 6.2 G/DL — SIGNIFICANT CHANGE UP (ref 6–8.3)
PROT SERPL-MCNC: 6.3 G/DL — SIGNIFICANT CHANGE UP (ref 6–8.3)
PROT SERPL-MCNC: 6.5 G/DL — SIGNIFICANT CHANGE UP (ref 6–8.3)
PROT SERPL-MCNC: 6.7 G/DL — SIGNIFICANT CHANGE UP (ref 6–8.3)
PROT SERPL-MCNC: 6.7 G/DL — SIGNIFICANT CHANGE UP (ref 6–8.3)
PROT SERPL-MCNC: 6.8 G/DL — SIGNIFICANT CHANGE UP (ref 6–8.3)
PROT SERPL-MCNC: 6.9 G/DL — SIGNIFICANT CHANGE UP (ref 6–8.3)
PROT SERPL-MCNC: 7.4 G/DL — SIGNIFICANT CHANGE UP (ref 6–8.3)
PROT SERPL-MCNC: 7.7 G/DL — SIGNIFICANT CHANGE UP (ref 6–8.3)
PROT UR-MCNC: ABNORMAL
PROT UR-MCNC: ABNORMAL
PROTHROM AB SERPL-ACNC: 10.9 SEC — SIGNIFICANT CHANGE UP (ref 10.6–13.6)
PROTHROM AB SERPL-ACNC: 12.4 SEC — SIGNIFICANT CHANGE UP (ref 10.6–13.6)
PROTHROM AB SERPL-ACNC: 13.1 SEC — SIGNIFICANT CHANGE UP (ref 10.6–13.6)
RAPIDTEG MAXIMUM AMPLITUDE: 68.4 MM — SIGNIFICANT CHANGE UP (ref 52–70)
RBC # BLD: 3.11 M/UL — LOW (ref 4.2–5.8)
RBC # BLD: 3.2 M/UL — LOW (ref 4.2–5.8)
RBC # BLD: 3.21 M/UL — LOW (ref 4.2–5.8)
RBC # BLD: 3.23 M/UL — LOW (ref 4.2–5.8)
RBC # BLD: 3.32 M/UL — LOW (ref 4.2–5.8)
RBC # BLD: 3.53 M/UL — LOW (ref 4.2–5.8)
RBC # BLD: 3.65 M/UL — LOW (ref 4.2–5.8)
RBC # BLD: 4.03 M/UL — LOW (ref 4.2–5.8)
RBC # BLD: 4.07 M/UL — LOW (ref 4.2–5.8)
RBC # BLD: 4.14 M/UL — LOW (ref 4.2–5.8)
RBC # BLD: 4.38 M/UL — SIGNIFICANT CHANGE UP (ref 4.2–5.8)
RBC # BLD: 4.62 M/UL — SIGNIFICANT CHANGE UP (ref 4.2–5.8)
RBC # BLD: 4.7 M/UL — SIGNIFICANT CHANGE UP (ref 4.2–5.8)
RBC # BLD: 4.71 M/UL — SIGNIFICANT CHANGE UP (ref 4.2–5.8)
RBC # FLD: 12.9 % — SIGNIFICANT CHANGE UP (ref 10.3–14.5)
RBC # FLD: 13 % — SIGNIFICANT CHANGE UP (ref 10.3–14.5)
RBC # FLD: 13 % — SIGNIFICANT CHANGE UP (ref 10.3–14.5)
RBC # FLD: 13.1 % — SIGNIFICANT CHANGE UP (ref 10.3–14.5)
RBC # FLD: 13.2 % — SIGNIFICANT CHANGE UP (ref 10.3–14.5)
RBC # FLD: 13.5 % — SIGNIFICANT CHANGE UP (ref 10.3–14.5)
RBC # FLD: 13.6 % — SIGNIFICANT CHANGE UP (ref 10.3–14.5)
RBC # FLD: 13.6 % — SIGNIFICANT CHANGE UP (ref 10.3–14.5)
RBC # FLD: 13.8 % — SIGNIFICANT CHANGE UP (ref 10.3–14.5)
RBC # FLD: 13.8 % — SIGNIFICANT CHANGE UP (ref 10.3–14.5)
RBC # FLD: 14 % — SIGNIFICANT CHANGE UP (ref 10.3–14.5)
RBC # FLD: 14.1 % — SIGNIFICANT CHANGE UP (ref 10.3–14.5)
RBC # FLD: 14.3 % — SIGNIFICANT CHANGE UP (ref 10.3–14.5)
RBC # FLD: 14.6 % — HIGH (ref 10.3–14.5)
RBC CASTS # UR COMP ASSIST: 1 /HPF — SIGNIFICANT CHANGE UP (ref 0–4)
RBC CASTS # UR COMP ASSIST: 9 /HPF — HIGH (ref 0–4)
RH IG SCN BLD-IMP: POSITIVE — SIGNIFICANT CHANGE UP
SAO2 % BLDV: 74 % — SIGNIFICANT CHANGE UP (ref 67–88)
SAO2 % BLDV: 91.8 % — HIGH (ref 67–88)
SARS-COV-2 RNA SPEC QL NAA+PROBE: SIGNIFICANT CHANGE UP
SODIUM SERPL-SCNC: 137 MMOL/L — SIGNIFICANT CHANGE UP (ref 135–145)
SODIUM SERPL-SCNC: 137 MMOL/L — SIGNIFICANT CHANGE UP (ref 135–145)
SODIUM SERPL-SCNC: 138 MMOL/L — SIGNIFICANT CHANGE UP (ref 135–145)
SODIUM SERPL-SCNC: 139 MMOL/L — SIGNIFICANT CHANGE UP (ref 135–145)
SODIUM SERPL-SCNC: 140 MMOL/L — SIGNIFICANT CHANGE UP (ref 135–145)
SODIUM SERPL-SCNC: 140 MMOL/L — SIGNIFICANT CHANGE UP (ref 135–145)
SODIUM SERPL-SCNC: 141 MMOL/L — SIGNIFICANT CHANGE UP (ref 135–145)
SODIUM SERPL-SCNC: 141 MMOL/L — SIGNIFICANT CHANGE UP (ref 135–145)
SODIUM SERPL-SCNC: 142 MMOL/L — SIGNIFICANT CHANGE UP (ref 135–145)
SODIUM SERPL-SCNC: 143 MMOL/L — SIGNIFICANT CHANGE UP (ref 135–145)
SODIUM SERPL-SCNC: 144 MMOL/L — SIGNIFICANT CHANGE UP (ref 135–145)
SODIUM SERPL-SCNC: 145 MMOL/L — SIGNIFICANT CHANGE UP (ref 135–145)
SODIUM SERPL-SCNC: 146 MMOL/L — HIGH (ref 135–145)
SODIUM SERPL-SCNC: 147 MMOL/L — HIGH (ref 135–145)
SODIUM SERPL-SCNC: 147 MMOL/L — HIGH (ref 135–145)
SODIUM SERPL-SCNC: 148 MMOL/L — HIGH (ref 135–145)
SODIUM SERPL-SCNC: 149 MMOL/L — HIGH (ref 135–145)
SODIUM SERPL-SCNC: 150 MMOL/L — HIGH (ref 135–145)
SODIUM SERPL-SCNC: 151 MMOL/L — HIGH (ref 135–145)
SODIUM SERPL-SCNC: 152 MMOL/L — HIGH (ref 135–145)
SODIUM SERPL-SCNC: 153 MMOL/L — HIGH (ref 135–145)
SODIUM UR-SCNC: 154 MMOL/L — SIGNIFICANT CHANGE UP
SP GR SPEC: 1.04 — HIGH (ref 1.01–1.02)
SP GR SPEC: 1.05 — HIGH (ref 1.01–1.02)
SPECIMEN SOURCE: SIGNIFICANT CHANGE UP
TEG FUNCTIONAL FIBRINOGEN: 31.7 MM — SIGNIFICANT CHANGE UP (ref 15–32)
TEG MAXIMUM AMPLITUDE: 67.1 MM — SIGNIFICANT CHANGE UP (ref 52–69)
TEG REACTION TIME: 5.7 MIN — SIGNIFICANT CHANGE UP (ref 4.6–9.1)
THC UR QL: NEGATIVE — SIGNIFICANT CHANGE UP
TRIGL SERPL-MCNC: 56 MG/DL — SIGNIFICANT CHANGE UP
TROPONIN I SERPL-MCNC: 0.06 NG/ML — SIGNIFICANT CHANGE UP (ref 0.02–0.06)
TROPONIN T, HIGH SENSITIVITY RESULT: 38 NG/L — SIGNIFICANT CHANGE UP (ref 0–51)
TROPONIN T, HIGH SENSITIVITY RESULT: 43 NG/L — SIGNIFICANT CHANGE UP (ref 0–51)
TSH SERPL-MCNC: 1.33 UIU/ML — SIGNIFICANT CHANGE UP (ref 0.27–4.2)
UROBILINOGEN FLD QL: NEGATIVE — SIGNIFICANT CHANGE UP
UROBILINOGEN FLD QL: NEGATIVE — SIGNIFICANT CHANGE UP
VANCOMYCIN TROUGH SERPL-MCNC: 5.5 UG/ML — LOW (ref 10–20)
WBC # BLD: 10.49 K/UL — SIGNIFICANT CHANGE UP (ref 3.8–10.5)
WBC # BLD: 10.51 K/UL — HIGH (ref 3.8–10.5)
WBC # BLD: 11.48 K/UL — HIGH (ref 3.8–10.5)
WBC # BLD: 11.75 K/UL — HIGH (ref 3.8–10.5)
WBC # BLD: 12.34 K/UL — HIGH (ref 3.8–10.5)
WBC # BLD: 12.86 K/UL — HIGH (ref 3.8–10.5)
WBC # BLD: 13.33 K/UL — HIGH (ref 3.8–10.5)
WBC # BLD: 13.33 K/UL — HIGH (ref 3.8–10.5)
WBC # BLD: 13.75 K/UL — HIGH (ref 3.8–10.5)
WBC # BLD: 14.4 K/UL — HIGH (ref 3.8–10.5)
WBC # BLD: 14.65 K/UL — HIGH (ref 3.8–10.5)
WBC # BLD: 16.1 K/UL — HIGH (ref 3.8–10.5)
WBC # BLD: 17.37 K/UL — HIGH (ref 3.8–10.5)
WBC # BLD: 23.07 K/UL — HIGH (ref 3.8–10.5)
WBC # FLD AUTO: 10.49 K/UL — SIGNIFICANT CHANGE UP (ref 3.8–10.5)
WBC # FLD AUTO: 10.51 K/UL — HIGH (ref 3.8–10.5)
WBC # FLD AUTO: 11.48 K/UL — HIGH (ref 3.8–10.5)
WBC # FLD AUTO: 11.75 K/UL — HIGH (ref 3.8–10.5)
WBC # FLD AUTO: 12.34 K/UL — HIGH (ref 3.8–10.5)
WBC # FLD AUTO: 12.86 K/UL — HIGH (ref 3.8–10.5)
WBC # FLD AUTO: 13.33 K/UL — HIGH (ref 3.8–10.5)
WBC # FLD AUTO: 13.33 K/UL — HIGH (ref 3.8–10.5)
WBC # FLD AUTO: 13.75 K/UL — HIGH (ref 3.8–10.5)
WBC # FLD AUTO: 14.4 K/UL — HIGH (ref 3.8–10.5)
WBC # FLD AUTO: 14.65 K/UL — HIGH (ref 3.8–10.5)
WBC # FLD AUTO: 16.1 K/UL — HIGH (ref 3.8–10.5)
WBC # FLD AUTO: 17.37 K/UL — HIGH (ref 3.8–10.5)
WBC # FLD AUTO: 23.07 K/UL — HIGH (ref 3.8–10.5)
WBC UR QL: 0 /HPF — SIGNIFICANT CHANGE UP (ref 0–5)
WBC UR QL: 13 /HPF — HIGH (ref 0–5)

## 2021-01-01 PROCEDURE — 36620 INSERTION CATHETER ARTERY: CPT

## 2021-01-01 PROCEDURE — 85730 THROMBOPLASTIN TIME PARTIAL: CPT

## 2021-01-01 PROCEDURE — 86901 BLOOD TYPING SEROLOGIC RH(D): CPT

## 2021-01-01 PROCEDURE — 95720 EEG PHY/QHP EA INCR W/VEEG: CPT

## 2021-01-01 PROCEDURE — 99291 CRITICAL CARE FIRST HOUR: CPT

## 2021-01-01 PROCEDURE — 85027 COMPLETE CBC AUTOMATED: CPT

## 2021-01-01 PROCEDURE — 80053 COMPREHEN METABOLIC PANEL: CPT

## 2021-01-01 PROCEDURE — 85610 PROTHROMBIN TIME: CPT

## 2021-01-01 PROCEDURE — 70450 CT HEAD/BRAIN W/O DYE: CPT | Mod: 26

## 2021-01-01 PROCEDURE — 80202 ASSAY OF VANCOMYCIN: CPT

## 2021-01-01 PROCEDURE — 87015 SPECIMEN INFECT AGNT CONCNTJ: CPT

## 2021-01-01 PROCEDURE — 99291 CRITICAL CARE FIRST HOUR: CPT | Mod: 25

## 2021-01-01 PROCEDURE — 82803 BLOOD GASES ANY COMBINATION: CPT

## 2021-01-01 PROCEDURE — 71275 CT ANGIOGRAPHY CHEST: CPT | Mod: MA

## 2021-01-01 PROCEDURE — 83880 ASSAY OF NATRIURETIC PEPTIDE: CPT

## 2021-01-01 PROCEDURE — 99285 EMERGENCY DEPT VISIT HI MDM: CPT | Mod: 25

## 2021-01-01 PROCEDURE — 99285 EMERGENCY DEPT VISIT HI MDM: CPT

## 2021-01-01 PROCEDURE — 96365 THER/PROPH/DIAG IV INF INIT: CPT

## 2021-01-01 PROCEDURE — 85014 HEMATOCRIT: CPT

## 2021-01-01 PROCEDURE — 99292 CRITICAL CARE ADDL 30 MIN: CPT | Mod: 25

## 2021-01-01 PROCEDURE — 84484 ASSAY OF TROPONIN QUANT: CPT

## 2021-01-01 PROCEDURE — 85025 COMPLETE CBC W/AUTO DIFF WBC: CPT

## 2021-01-01 PROCEDURE — 87077 CULTURE AEROBIC IDENTIFY: CPT

## 2021-01-01 PROCEDURE — 71045 X-RAY EXAM CHEST 1 VIEW: CPT

## 2021-01-01 PROCEDURE — 82553 CREATINE MB FRACTION: CPT

## 2021-01-01 PROCEDURE — 84145 PROCALCITONIN (PCT): CPT

## 2021-01-01 PROCEDURE — 93010 ELECTROCARDIOGRAM REPORT: CPT

## 2021-01-01 PROCEDURE — 83986 ASSAY PH BODY FLUID NOS: CPT

## 2021-01-01 PROCEDURE — 71250 CT THORAX DX C-: CPT

## 2021-01-01 PROCEDURE — 70498 CT ANGIOGRAPHY NECK: CPT | Mod: 26

## 2021-01-01 PROCEDURE — 99255 IP/OBS CONSLTJ NEW/EST HI 80: CPT

## 2021-01-01 PROCEDURE — 71045 X-RAY EXAM CHEST 1 VIEW: CPT | Mod: 26

## 2021-01-01 PROCEDURE — 99292 CRITICAL CARE ADDL 30 MIN: CPT

## 2021-01-01 PROCEDURE — 74174 CTA ABD&PLVS W/CONTRAST: CPT

## 2021-01-01 PROCEDURE — 96375 TX/PRO/DX INJ NEW DRUG ADDON: CPT

## 2021-01-01 PROCEDURE — 87070 CULTURE OTHR SPECIMN AEROBIC: CPT

## 2021-01-01 PROCEDURE — G1004: CPT

## 2021-01-01 PROCEDURE — 31500 INSERT EMERGENCY AIRWAY: CPT

## 2021-01-01 PROCEDURE — 82435 ASSAY OF BLOOD CHLORIDE: CPT

## 2021-01-01 PROCEDURE — 82962 GLUCOSE BLOOD TEST: CPT

## 2021-01-01 PROCEDURE — 83036 HEMOGLOBIN GLYCOSYLATED A1C: CPT

## 2021-01-01 PROCEDURE — 83930 ASSAY OF BLOOD OSMOLALITY: CPT

## 2021-01-01 PROCEDURE — 94799 UNLISTED PULMONARY SVC/PX: CPT

## 2021-01-01 PROCEDURE — 82947 ASSAY GLUCOSE BLOOD QUANT: CPT

## 2021-01-01 PROCEDURE — 83935 ASSAY OF URINE OSMOLALITY: CPT

## 2021-01-01 PROCEDURE — 93005 ELECTROCARDIOGRAM TRACING: CPT

## 2021-01-01 PROCEDURE — 80307 DRUG TEST PRSMV CHEM ANLYZR: CPT

## 2021-01-01 PROCEDURE — 93306 TTE W/DOPPLER COMPLETE: CPT | Mod: 26

## 2021-01-01 PROCEDURE — 74174 CTA ABD&PLVS W/CONTRAST: CPT | Mod: 26

## 2021-01-01 PROCEDURE — 84588 ASSAY OF VASOPRESSIN: CPT

## 2021-01-01 PROCEDURE — 82330 ASSAY OF CALCIUM: CPT

## 2021-01-01 PROCEDURE — 93306 TTE W/DOPPLER COMPLETE: CPT

## 2021-01-01 PROCEDURE — 71275 CT ANGIOGRAPHY CHEST: CPT | Mod: 26

## 2021-01-01 PROCEDURE — 70496 CT ANGIOGRAPHY HEAD: CPT | Mod: 26

## 2021-01-01 PROCEDURE — 80048 BASIC METABOLIC PNL TOTAL CA: CPT

## 2021-01-01 PROCEDURE — 70450 CT HEAD/BRAIN W/O DYE: CPT | Mod: MA

## 2021-01-01 PROCEDURE — 87040 BLOOD CULTURE FOR BACTERIA: CPT

## 2021-01-01 PROCEDURE — 94002 VENT MGMT INPAT INIT DAY: CPT

## 2021-01-01 PROCEDURE — 36415 COLL VENOUS BLD VENIPUNCTURE: CPT

## 2021-01-01 PROCEDURE — 99221 1ST HOSP IP/OBS SF/LOW 40: CPT

## 2021-01-01 PROCEDURE — 84100 ASSAY OF PHOSPHORUS: CPT

## 2021-01-01 PROCEDURE — 85384 FIBRINOGEN ACTIVITY: CPT

## 2021-01-01 PROCEDURE — 84295 ASSAY OF SERUM SODIUM: CPT

## 2021-01-01 PROCEDURE — 85018 HEMOGLOBIN: CPT

## 2021-01-01 PROCEDURE — 70551 MRI BRAIN STEM W/O DYE: CPT

## 2021-01-01 PROCEDURE — 83735 ASSAY OF MAGNESIUM: CPT

## 2021-01-01 PROCEDURE — 36556 INSERT NON-TUNNEL CV CATH: CPT

## 2021-01-01 PROCEDURE — 70450 CT HEAD/BRAIN W/O DYE: CPT | Mod: MG

## 2021-01-01 PROCEDURE — 87186 SC STD MICRODIL/AGAR DIL: CPT

## 2021-01-01 PROCEDURE — 85396 CLOTTING ASSAY WHOLE BLOOD: CPT

## 2021-01-01 PROCEDURE — 87206 SMEAR FLUORESCENT/ACID STAI: CPT

## 2021-01-01 PROCEDURE — 81001 URINALYSIS AUTO W/SCOPE: CPT

## 2021-01-01 PROCEDURE — 71045 X-RAY EXAM CHEST 1 VIEW: CPT | Mod: 26,76

## 2021-01-01 PROCEDURE — 71250 CT THORAX DX C-: CPT | Mod: 26

## 2021-01-01 PROCEDURE — 95718 EEG PHYS/QHP 2-12 HR W/VEEG: CPT

## 2021-01-01 PROCEDURE — 82565 ASSAY OF CREATININE: CPT

## 2021-01-01 PROCEDURE — 86850 RBC ANTIBODY SCREEN: CPT

## 2021-01-01 PROCEDURE — 83605 ASSAY OF LACTIC ACID: CPT

## 2021-01-01 PROCEDURE — 95700 EEG CONT REC W/VID EEG TECH: CPT

## 2021-01-01 PROCEDURE — 87150 DNA/RNA AMPLIFIED PROBE: CPT

## 2021-01-01 PROCEDURE — 70551 MRI BRAIN STEM W/O DYE: CPT | Mod: 26

## 2021-01-01 PROCEDURE — 94003 VENT MGMT INPAT SUBQ DAY: CPT

## 2021-01-01 PROCEDURE — 31645 BRNCHSC W/THER ASPIR 1ST: CPT

## 2021-01-01 PROCEDURE — 87635 SARS-COV-2 COVID-19 AMP PRB: CPT

## 2021-01-01 PROCEDURE — 85049 AUTOMATED PLATELET COUNT: CPT

## 2021-01-01 PROCEDURE — 86900 BLOOD TYPING SEROLOGIC ABO: CPT

## 2021-01-01 PROCEDURE — 96376 TX/PRO/DX INJ SAME DRUG ADON: CPT

## 2021-01-01 PROCEDURE — 95714 VEEG EA 12-26 HR UNMNTR: CPT

## 2021-01-01 PROCEDURE — 87116 MYCOBACTERIA CULTURE: CPT

## 2021-01-01 PROCEDURE — 70450 CT HEAD/BRAIN W/O DYE: CPT | Mod: 26,MA

## 2021-01-01 PROCEDURE — 84300 ASSAY OF URINE SODIUM: CPT

## 2021-01-01 PROCEDURE — 84132 ASSAY OF SERUM POTASSIUM: CPT

## 2021-01-01 PROCEDURE — 82570 ASSAY OF URINE CREATININE: CPT

## 2021-01-01 PROCEDURE — 70498 CT ANGIOGRAPHY NECK: CPT | Mod: MG

## 2021-01-01 PROCEDURE — 99223 1ST HOSP IP/OBS HIGH 75: CPT

## 2021-01-01 PROCEDURE — 95711 VEEG 2-12 HR UNMONITORED: CPT

## 2021-01-01 PROCEDURE — 80061 LIPID PANEL: CPT

## 2021-01-01 PROCEDURE — 84443 ASSAY THYROID STIM HORMONE: CPT

## 2021-01-01 PROCEDURE — 82550 ASSAY OF CK (CPK): CPT

## 2021-01-01 PROCEDURE — 85576 BLOOD PLATELET AGGREGATION: CPT

## 2021-01-01 PROCEDURE — 96374 THER/PROPH/DIAG INJ IV PUSH: CPT

## 2021-01-01 PROCEDURE — 70496 CT ANGIOGRAPHY HEAD: CPT

## 2021-01-01 RX ORDER — LABETALOL HCL 100 MG
10 TABLET ORAL ONCE
Refills: 0 | Status: COMPLETED | OUTPATIENT
Start: 2021-01-01 | End: 2021-01-01

## 2021-01-01 RX ORDER — ACETAMINOPHEN 500 MG
650 TABLET ORAL EVERY 6 HOURS
Refills: 0 | Status: DISCONTINUED | OUTPATIENT
Start: 2021-01-01 | End: 2021-01-01

## 2021-01-01 RX ORDER — VANCOMYCIN HCL 1 G
1000 VIAL (EA) INTRAVENOUS ONCE
Refills: 0 | Status: COMPLETED | OUTPATIENT
Start: 2021-01-01 | End: 2021-01-01

## 2021-01-01 RX ORDER — SODIUM CHLORIDE 9 MG/ML
1000 INJECTION, SOLUTION INTRAVENOUS
Refills: 0 | Status: DISCONTINUED | OUTPATIENT
Start: 2021-01-01 | End: 2021-01-01

## 2021-01-01 RX ORDER — LABETALOL HCL 100 MG
600 TABLET ORAL EVERY 8 HOURS
Refills: 0 | Status: DISCONTINUED | OUTPATIENT
Start: 2021-01-01 | End: 2021-01-01

## 2021-01-01 RX ORDER — MANNITOL
20 POWDER (GRAM) MISCELLANEOUS
Qty: 100 | Refills: 0 | Status: COMPLETED | OUTPATIENT
Start: 2021-01-01 | End: 2021-01-01

## 2021-01-01 RX ORDER — ACETAMINOPHEN 500 MG
1000 TABLET ORAL ONCE
Refills: 0 | Status: COMPLETED | OUTPATIENT
Start: 2021-01-01 | End: 2021-01-01

## 2021-01-01 RX ORDER — VANCOMYCIN HCL 1 G
1500 VIAL (EA) INTRAVENOUS EVERY 24 HOURS
Refills: 0 | Status: DISCONTINUED | OUTPATIENT
Start: 2021-01-01 | End: 2021-01-01

## 2021-01-01 RX ORDER — PANTOPRAZOLE SODIUM 20 MG/1
40 TABLET, DELAYED RELEASE ORAL DAILY
Refills: 0 | Status: DISCONTINUED | OUTPATIENT
Start: 2021-01-01 | End: 2021-01-01

## 2021-01-01 RX ORDER — LEVETIRACETAM 250 MG/1
750 TABLET, FILM COATED ORAL EVERY 12 HOURS
Refills: 0 | Status: DISCONTINUED | OUTPATIENT
Start: 2021-01-01 | End: 2021-01-01

## 2021-01-01 RX ORDER — LEVETIRACETAM 250 MG/1
1000 TABLET, FILM COATED ORAL EVERY 12 HOURS
Refills: 0 | Status: DISCONTINUED | OUTPATIENT
Start: 2021-01-01 | End: 2021-01-01

## 2021-01-01 RX ORDER — HYDROMORPHONE HYDROCHLORIDE 2 MG/ML
0.5 INJECTION INTRAMUSCULAR; INTRAVENOUS; SUBCUTANEOUS ONCE
Refills: 0 | Status: DISCONTINUED | OUTPATIENT
Start: 2021-01-01 | End: 2021-01-01

## 2021-01-01 RX ORDER — AMLODIPINE BESYLATE 2.5 MG/1
10 TABLET ORAL
Refills: 0 | Status: DISCONTINUED | OUTPATIENT
Start: 2021-01-01 | End: 2021-01-01

## 2021-01-01 RX ORDER — NICARDIPINE HYDROCHLORIDE 30 MG/1
5 CAPSULE, EXTENDED RELEASE ORAL
Qty: 40 | Refills: 0 | Status: DISCONTINUED | OUTPATIENT
Start: 2021-01-01 | End: 2021-01-01

## 2021-01-01 RX ORDER — SPIRONOLACTONE 25 MG/1
25 TABLET, FILM COATED ORAL DAILY
Refills: 0 | Status: DISCONTINUED | OUTPATIENT
Start: 2021-01-01 | End: 2021-01-01

## 2021-01-01 RX ORDER — LABETALOL HCL 100 MG
300 TABLET ORAL EVERY 8 HOURS
Refills: 0 | Status: DISCONTINUED | OUTPATIENT
Start: 2021-01-01 | End: 2021-01-01

## 2021-01-01 RX ORDER — LABETALOL HCL 100 MG
2 TABLET ORAL
Qty: 400 | Refills: 0 | Status: DISCONTINUED | OUTPATIENT
Start: 2021-01-01 | End: 2021-01-01

## 2021-01-01 RX ORDER — POTASSIUM CHLORIDE 20 MEQ
10 PACKET (EA) ORAL ONCE
Refills: 0 | Status: COMPLETED | OUTPATIENT
Start: 2021-01-01 | End: 2021-01-01

## 2021-01-01 RX ORDER — AMLODIPINE BESYLATE 2.5 MG/1
10 TABLET ORAL DAILY
Refills: 0 | Status: DISCONTINUED | OUTPATIENT
Start: 2021-01-01 | End: 2021-01-01

## 2021-01-01 RX ORDER — PROPOFOL 10 MG/ML
20 INJECTION, EMULSION INTRAVENOUS
Qty: 1000 | Refills: 0 | Status: DISCONTINUED | OUTPATIENT
Start: 2021-01-01 | End: 2021-01-01

## 2021-01-01 RX ORDER — LABETALOL HCL 100 MG
5 TABLET ORAL ONCE
Refills: 0 | Status: COMPLETED | OUTPATIENT
Start: 2021-01-01 | End: 2021-01-01

## 2021-01-01 RX ORDER — CHLORHEXIDINE GLUCONATE 213 G/1000ML
1 SOLUTION TOPICAL
Refills: 0 | Status: DISCONTINUED | OUTPATIENT
Start: 2021-01-01 | End: 2021-01-01

## 2021-01-01 RX ORDER — LABETALOL HCL 100 MG
200 TABLET ORAL EVERY 8 HOURS
Refills: 0 | Status: DISCONTINUED | OUTPATIENT
Start: 2021-01-01 | End: 2021-01-01

## 2021-01-01 RX ORDER — MANNITOL
20 POWDER (GRAM) MISCELLANEOUS ONCE
Refills: 0 | Status: DISCONTINUED | OUTPATIENT
Start: 2021-01-01 | End: 2021-01-01

## 2021-01-01 RX ORDER — PIPERACILLIN AND TAZOBACTAM 4; .5 G/20ML; G/20ML
3.38 INJECTION, POWDER, LYOPHILIZED, FOR SOLUTION INTRAVENOUS ONCE
Refills: 0 | Status: COMPLETED | OUTPATIENT
Start: 2021-01-01 | End: 2021-01-01

## 2021-01-01 RX ORDER — VANCOMYCIN HCL 1 G
1000 VIAL (EA) INTRAVENOUS DAILY
Refills: 0 | Status: DISCONTINUED | OUTPATIENT
Start: 2021-01-01 | End: 2021-01-01

## 2021-01-01 RX ORDER — NICARDIPINE HYDROCHLORIDE 30 MG/1
2 CAPSULE, EXTENDED RELEASE ORAL
Qty: 40 | Refills: 0 | Status: DISCONTINUED | OUTPATIENT
Start: 2021-01-01 | End: 2021-01-01

## 2021-01-01 RX ORDER — ESMOLOL HCL 100MG/10ML
50 VIAL (ML) INTRAVENOUS
Qty: 2500 | Refills: 0 | Status: DISCONTINUED | OUTPATIENT
Start: 2021-01-01 | End: 2021-01-01

## 2021-01-01 RX ORDER — VANCOMYCIN HCL 1 G
VIAL (EA) INTRAVENOUS
Refills: 0 | Status: DISCONTINUED | OUTPATIENT
Start: 2021-01-01 | End: 2021-01-01

## 2021-01-01 RX ORDER — ETOMIDATE 2 MG/ML
20 INJECTION INTRAVENOUS ONCE
Refills: 0 | Status: COMPLETED | OUTPATIENT
Start: 2021-01-01 | End: 2021-01-01

## 2021-01-01 RX ORDER — VECURONIUM BROMIDE 20 MG/1
10 INJECTION, POWDER, FOR SOLUTION INTRAVENOUS ONCE
Refills: 0 | Status: COMPLETED | OUTPATIENT
Start: 2021-01-01 | End: 2021-01-01

## 2021-01-01 RX ORDER — NIMODIPINE 60 MG/10ML
60 SOLUTION ORAL ONCE
Refills: 0 | Status: DISCONTINUED | OUTPATIENT
Start: 2021-01-01 | End: 2021-01-01

## 2021-01-01 RX ORDER — PROPOFOL 10 MG/ML
20 INJECTION, EMULSION INTRAVENOUS
Qty: 500 | Refills: 0 | Status: DISCONTINUED | OUTPATIENT
Start: 2021-01-01 | End: 2021-01-01

## 2021-01-01 RX ORDER — FUROSEMIDE 40 MG
40 TABLET ORAL ONCE
Refills: 0 | Status: COMPLETED | OUTPATIENT
Start: 2021-01-01 | End: 2021-01-01

## 2021-01-01 RX ORDER — LABETALOL HCL 100 MG
200 TABLET ORAL ONCE
Refills: 0 | Status: COMPLETED | OUTPATIENT
Start: 2021-01-01 | End: 2021-01-01

## 2021-01-01 RX ORDER — FENTANYL CITRATE 50 UG/ML
1 INJECTION INTRAVENOUS
Qty: 5000 | Refills: 0 | Status: DISCONTINUED | OUTPATIENT
Start: 2021-01-01 | End: 2021-01-01

## 2021-01-01 RX ORDER — SODIUM CHLORIDE 5 G/100ML
1000 INJECTION, SOLUTION INTRAVENOUS
Refills: 0 | Status: DISCONTINUED | OUTPATIENT
Start: 2021-01-01 | End: 2021-01-01

## 2021-01-01 RX ORDER — HYDRALAZINE HCL 50 MG
10 TABLET ORAL ONCE
Refills: 0 | Status: COMPLETED | OUTPATIENT
Start: 2021-01-01 | End: 2021-01-01

## 2021-01-01 RX ORDER — CHLORHEXIDINE GLUCONATE 213 G/1000ML
15 SOLUTION TOPICAL EVERY 12 HOURS
Refills: 0 | Status: DISCONTINUED | OUTPATIENT
Start: 2021-01-01 | End: 2021-01-01

## 2021-01-01 RX ORDER — MAGNESIUM SULFATE 500 MG/ML
1 VIAL (ML) INJECTION ONCE
Refills: 0 | Status: COMPLETED | OUTPATIENT
Start: 2021-01-01 | End: 2021-01-01

## 2021-01-01 RX ORDER — HEPARIN SODIUM 5000 [USP'U]/ML
5000 INJECTION INTRAVENOUS; SUBCUTANEOUS EVERY 8 HOURS
Refills: 0 | Status: DISCONTINUED | OUTPATIENT
Start: 2021-01-01 | End: 2021-01-01

## 2021-01-01 RX ORDER — SODIUM CHLORIDE 9 MG/ML
1000 INJECTION INTRAMUSCULAR; INTRAVENOUS; SUBCUTANEOUS
Refills: 0 | Status: DISCONTINUED | OUTPATIENT
Start: 2021-01-01 | End: 2021-01-01

## 2021-01-01 RX ORDER — FENTANYL CITRATE 50 UG/ML
1 INJECTION INTRAVENOUS
Qty: 2500 | Refills: 0 | Status: DISCONTINUED | OUTPATIENT
Start: 2021-01-01 | End: 2021-01-01

## 2021-01-01 RX ORDER — FUROSEMIDE 40 MG
20 TABLET ORAL ONCE
Refills: 0 | Status: COMPLETED | OUTPATIENT
Start: 2021-01-01 | End: 2021-01-01

## 2021-01-01 RX ORDER — ROBINUL 0.2 MG/ML
0.2 INJECTION INTRAMUSCULAR; INTRAVENOUS ONCE
Refills: 0 | Status: COMPLETED | OUTPATIENT
Start: 2021-01-01 | End: 2021-01-01

## 2021-01-01 RX ORDER — PIPERACILLIN AND TAZOBACTAM 4; .5 G/20ML; G/20ML
3.38 INJECTION, POWDER, LYOPHILIZED, FOR SOLUTION INTRAVENOUS EVERY 8 HOURS
Refills: 0 | Status: DISCONTINUED | OUTPATIENT
Start: 2021-01-01 | End: 2021-01-01

## 2021-01-01 RX ORDER — VANCOMYCIN HCL 1 G
1500 VIAL (EA) INTRAVENOUS ONCE
Refills: 0 | Status: COMPLETED | OUTPATIENT
Start: 2021-01-01 | End: 2021-01-01

## 2021-01-01 RX ORDER — ATORVASTATIN CALCIUM 80 MG/1
40 TABLET, FILM COATED ORAL AT BEDTIME
Refills: 0 | Status: DISCONTINUED | OUTPATIENT
Start: 2021-01-01 | End: 2021-01-01

## 2021-01-01 RX ORDER — LABETALOL HCL 100 MG
400 TABLET ORAL EVERY 8 HOURS
Refills: 0 | Status: DISCONTINUED | OUTPATIENT
Start: 2021-01-01 | End: 2021-01-01

## 2021-01-01 RX ORDER — VECURONIUM BROMIDE 20 MG/1
5 INJECTION, POWDER, FOR SOLUTION INTRAVENOUS ONCE
Refills: 0 | Status: COMPLETED | OUTPATIENT
Start: 2021-01-01 | End: 2021-01-01

## 2021-01-01 RX ADMIN — PIPERACILLIN AND TAZOBACTAM 25 GRAM(S): 4; .5 INJECTION, POWDER, LYOPHILIZED, FOR SOLUTION INTRAVENOUS at 13:00

## 2021-01-01 RX ADMIN — AMLODIPINE BESYLATE 10 MILLIGRAM(S): 2.5 TABLET ORAL at 18:57

## 2021-01-01 RX ADMIN — CHLORHEXIDINE GLUCONATE 15 MILLILITER(S): 213 SOLUTION TOPICAL at 05:54

## 2021-01-01 RX ADMIN — PANTOPRAZOLE SODIUM 40 MILLIGRAM(S): 20 TABLET, DELAYED RELEASE ORAL at 18:19

## 2021-01-01 RX ADMIN — Medication 10 MILLIGRAM(S): at 03:30

## 2021-01-01 RX ADMIN — HEPARIN SODIUM 5000 UNIT(S): 5000 INJECTION INTRAVENOUS; SUBCUTANEOUS at 05:47

## 2021-01-01 RX ADMIN — CHLORHEXIDINE GLUCONATE 15 MILLILITER(S): 213 SOLUTION TOPICAL at 17:28

## 2021-01-01 RX ADMIN — HEPARIN SODIUM 5000 UNIT(S): 5000 INJECTION INTRAVENOUS; SUBCUTANEOUS at 05:02

## 2021-01-01 RX ADMIN — PIPERACILLIN AND TAZOBACTAM 25 GRAM(S): 4; .5 INJECTION, POWDER, LYOPHILIZED, FOR SOLUTION INTRAVENOUS at 14:33

## 2021-01-01 RX ADMIN — Medication 10 MILLIGRAM(S): at 12:24

## 2021-01-01 RX ADMIN — HEPARIN SODIUM 5000 UNIT(S): 5000 INJECTION INTRAVENOUS; SUBCUTANEOUS at 05:08

## 2021-01-01 RX ADMIN — Medication 200 MILLIGRAM(S): at 21:10

## 2021-01-01 RX ADMIN — LEVETIRACETAM 400 MILLIGRAM(S): 250 TABLET, FILM COATED ORAL at 05:54

## 2021-01-01 RX ADMIN — CHLORHEXIDINE GLUCONATE 15 MILLILITER(S): 213 SOLUTION TOPICAL at 05:15

## 2021-01-01 RX ADMIN — NICARDIPINE HYDROCHLORIDE 25 MG/HR: 30 CAPSULE, EXTENDED RELEASE ORAL at 09:49

## 2021-01-01 RX ADMIN — CHLORHEXIDINE GLUCONATE 15 MILLILITER(S): 213 SOLUTION TOPICAL at 05:47

## 2021-01-01 RX ADMIN — Medication 650 MILLIGRAM(S): at 01:28

## 2021-01-01 RX ADMIN — PIPERACILLIN AND TAZOBACTAM 25 GRAM(S): 4; .5 INJECTION, POWDER, LYOPHILIZED, FOR SOLUTION INTRAVENOUS at 21:01

## 2021-01-01 RX ADMIN — Medication 300 MILLIGRAM(S): at 01:45

## 2021-01-01 RX ADMIN — Medication 100 GM/HR: at 09:39

## 2021-01-01 RX ADMIN — HEPARIN SODIUM 5000 UNIT(S): 5000 INJECTION INTRAVENOUS; SUBCUTANEOUS at 20:00

## 2021-01-01 RX ADMIN — LEVETIRACETAM 400 MILLIGRAM(S): 250 TABLET, FILM COATED ORAL at 18:24

## 2021-01-01 RX ADMIN — PROPOFOL 12.8 MICROGRAM(S)/KG/MIN: 10 INJECTION, EMULSION INTRAVENOUS at 21:04

## 2021-01-01 RX ADMIN — CHLORHEXIDINE GLUCONATE 15 MILLILITER(S): 213 SOLUTION TOPICAL at 18:24

## 2021-01-01 RX ADMIN — SODIUM CHLORIDE 75 MILLILITER(S): 9 INJECTION, SOLUTION INTRAVENOUS at 15:02

## 2021-01-01 RX ADMIN — LEVETIRACETAM 400 MILLIGRAM(S): 250 TABLET, FILM COATED ORAL at 17:02

## 2021-01-01 RX ADMIN — CHLORHEXIDINE GLUCONATE 15 MILLILITER(S): 213 SOLUTION TOPICAL at 05:59

## 2021-01-01 RX ADMIN — FENTANYL CITRATE 5.35 MICROGRAM(S)/KG/HR: 50 INJECTION INTRAVENOUS at 16:40

## 2021-01-01 RX ADMIN — HEPARIN SODIUM 5000 UNIT(S): 5000 INJECTION INTRAVENOUS; SUBCUTANEOUS at 14:10

## 2021-01-01 RX ADMIN — CHLORHEXIDINE GLUCONATE 15 MILLILITER(S): 213 SOLUTION TOPICAL at 05:14

## 2021-01-01 RX ADMIN — HEPARIN SODIUM 5000 UNIT(S): 5000 INJECTION INTRAVENOUS; SUBCUTANEOUS at 05:14

## 2021-01-01 RX ADMIN — AMLODIPINE BESYLATE 10 MILLIGRAM(S): 2.5 TABLET ORAL at 18:08

## 2021-01-01 RX ADMIN — AMLODIPINE BESYLATE 10 MILLIGRAM(S): 2.5 TABLET ORAL at 05:04

## 2021-01-01 RX ADMIN — HEPARIN SODIUM 5000 UNIT(S): 5000 INJECTION INTRAVENOUS; SUBCUTANEOUS at 21:05

## 2021-01-01 RX ADMIN — HEPARIN SODIUM 5000 UNIT(S): 5000 INJECTION INTRAVENOUS; SUBCUTANEOUS at 14:00

## 2021-01-01 RX ADMIN — Medication 300 MILLIGRAM(S): at 04:43

## 2021-01-01 RX ADMIN — PIPERACILLIN AND TAZOBACTAM 25 GRAM(S): 4; .5 INJECTION, POWDER, LYOPHILIZED, FOR SOLUTION INTRAVENOUS at 12:06

## 2021-01-01 RX ADMIN — PIPERACILLIN AND TAZOBACTAM 25 GRAM(S): 4; .5 INJECTION, POWDER, LYOPHILIZED, FOR SOLUTION INTRAVENOUS at 05:08

## 2021-01-01 RX ADMIN — NICARDIPINE HYDROCHLORIDE 10 MG/HR: 30 CAPSULE, EXTENDED RELEASE ORAL at 14:09

## 2021-01-01 RX ADMIN — HYDROMORPHONE HYDROCHLORIDE 0.5 MILLIGRAM(S): 2 INJECTION INTRAMUSCULAR; INTRAVENOUS; SUBCUTANEOUS at 01:30

## 2021-01-01 RX ADMIN — CHLORHEXIDINE GLUCONATE 15 MILLILITER(S): 213 SOLUTION TOPICAL at 17:35

## 2021-01-01 RX ADMIN — LEVETIRACETAM 400 MILLIGRAM(S): 250 TABLET, FILM COATED ORAL at 05:09

## 2021-01-01 RX ADMIN — NICARDIPINE HYDROCHLORIDE 25 MG/HR: 30 CAPSULE, EXTENDED RELEASE ORAL at 10:50

## 2021-01-01 RX ADMIN — PANTOPRAZOLE SODIUM 40 MILLIGRAM(S): 20 TABLET, DELAYED RELEASE ORAL at 12:24

## 2021-01-01 RX ADMIN — PIPERACILLIN AND TAZOBACTAM 25 GRAM(S): 4; .5 INJECTION, POWDER, LYOPHILIZED, FOR SOLUTION INTRAVENOUS at 05:02

## 2021-01-01 RX ADMIN — LEVETIRACETAM 400 MILLIGRAM(S): 250 TABLET, FILM COATED ORAL at 05:01

## 2021-01-01 RX ADMIN — LEVETIRACETAM 400 MILLIGRAM(S): 250 TABLET, FILM COATED ORAL at 05:16

## 2021-01-01 RX ADMIN — VECURONIUM BROMIDE 5 MILLIGRAM(S): 20 INJECTION, POWDER, FOR SOLUTION INTRAVENOUS at 13:18

## 2021-01-01 RX ADMIN — CHLORHEXIDINE GLUCONATE 15 MILLILITER(S): 213 SOLUTION TOPICAL at 17:33

## 2021-01-01 RX ADMIN — LEVETIRACETAM 400 MILLIGRAM(S): 250 TABLET, FILM COATED ORAL at 05:47

## 2021-01-01 RX ADMIN — Medication 650 MILLIGRAM(S): at 16:00

## 2021-01-01 RX ADMIN — Medication 50 MILLIEQUIVALENT(S): at 04:17

## 2021-01-01 RX ADMIN — LEVETIRACETAM 400 MILLIGRAM(S): 250 TABLET, FILM COATED ORAL at 17:27

## 2021-01-01 RX ADMIN — PIPERACILLIN AND TAZOBACTAM 25 GRAM(S): 4; .5 INJECTION, POWDER, LYOPHILIZED, FOR SOLUTION INTRAVENOUS at 12:03

## 2021-01-01 RX ADMIN — AMLODIPINE BESYLATE 10 MILLIGRAM(S): 2.5 TABLET ORAL at 05:08

## 2021-01-01 RX ADMIN — Medication 300 MILLIGRAM(S): at 21:41

## 2021-01-01 RX ADMIN — AMLODIPINE BESYLATE 10 MILLIGRAM(S): 2.5 TABLET ORAL at 17:51

## 2021-01-01 RX ADMIN — CHLORHEXIDINE GLUCONATE 15 MILLILITER(S): 213 SOLUTION TOPICAL at 06:45

## 2021-01-01 RX ADMIN — Medication 200 MILLIGRAM(S): at 13:25

## 2021-01-01 RX ADMIN — Medication 250 MILLIGRAM(S): at 12:20

## 2021-01-01 RX ADMIN — HEPARIN SODIUM 5000 UNIT(S): 5000 INJECTION INTRAVENOUS; SUBCUTANEOUS at 21:04

## 2021-01-01 RX ADMIN — PIPERACILLIN AND TAZOBACTAM 25 GRAM(S): 4; .5 INJECTION, POWDER, LYOPHILIZED, FOR SOLUTION INTRAVENOUS at 05:35

## 2021-01-01 RX ADMIN — LEVETIRACETAM 400 MILLIGRAM(S): 250 TABLET, FILM COATED ORAL at 18:08

## 2021-01-01 RX ADMIN — Medication 650 MILLIGRAM(S): at 09:30

## 2021-01-01 RX ADMIN — Medication 10 MILLIGRAM(S): at 12:23

## 2021-01-01 RX ADMIN — Medication 0.2 MILLIGRAM(S): at 16:19

## 2021-01-01 RX ADMIN — PIPERACILLIN AND TAZOBACTAM 25 GRAM(S): 4; .5 INJECTION, POWDER, LYOPHILIZED, FOR SOLUTION INTRAVENOUS at 12:20

## 2021-01-01 RX ADMIN — HEPARIN SODIUM 5000 UNIT(S): 5000 INJECTION INTRAVENOUS; SUBCUTANEOUS at 05:04

## 2021-01-01 RX ADMIN — PIPERACILLIN AND TAZOBACTAM 25 GRAM(S): 4; .5 INJECTION, POWDER, LYOPHILIZED, FOR SOLUTION INTRAVENOUS at 21:47

## 2021-01-01 RX ADMIN — Medication 650 MILLIGRAM(S): at 13:10

## 2021-01-01 RX ADMIN — PANTOPRAZOLE SODIUM 40 MILLIGRAM(S): 20 TABLET, DELAYED RELEASE ORAL at 11:44

## 2021-01-01 RX ADMIN — NICARDIPINE HYDROCHLORIDE 10 MG/HR: 30 CAPSULE, EXTENDED RELEASE ORAL at 21:04

## 2021-01-01 RX ADMIN — Medication 200 MILLIGRAM(S): at 13:48

## 2021-01-01 RX ADMIN — NICARDIPINE HYDROCHLORIDE 10 MG/HR: 30 CAPSULE, EXTENDED RELEASE ORAL at 12:20

## 2021-01-01 RX ADMIN — PIPERACILLIN AND TAZOBACTAM 25 GRAM(S): 4; .5 INJECTION, POWDER, LYOPHILIZED, FOR SOLUTION INTRAVENOUS at 13:34

## 2021-01-01 RX ADMIN — AMLODIPINE BESYLATE 10 MILLIGRAM(S): 2.5 TABLET ORAL at 05:59

## 2021-01-01 RX ADMIN — HYDROMORPHONE HYDROCHLORIDE 0.5 MILLIGRAM(S): 2 INJECTION INTRAMUSCULAR; INTRAVENOUS; SUBCUTANEOUS at 18:25

## 2021-01-01 RX ADMIN — HYDROMORPHONE HYDROCHLORIDE 0.5 MILLIGRAM(S): 2 INJECTION INTRAMUSCULAR; INTRAVENOUS; SUBCUTANEOUS at 09:00

## 2021-01-01 RX ADMIN — CHLORHEXIDINE GLUCONATE 1 APPLICATION(S): 213 SOLUTION TOPICAL at 05:02

## 2021-01-01 RX ADMIN — ATORVASTATIN CALCIUM 40 MILLIGRAM(S): 80 TABLET, FILM COATED ORAL at 21:04

## 2021-01-01 RX ADMIN — Medication 10 MILLIGRAM(S): at 00:25

## 2021-01-01 RX ADMIN — HEPARIN SODIUM 5000 UNIT(S): 5000 INJECTION INTRAVENOUS; SUBCUTANEOUS at 21:47

## 2021-01-01 RX ADMIN — LEVETIRACETAM 400 MILLIGRAM(S): 250 TABLET, FILM COATED ORAL at 17:50

## 2021-01-01 RX ADMIN — PIPERACILLIN AND TAZOBACTAM 25 GRAM(S): 4; .5 INJECTION, POWDER, LYOPHILIZED, FOR SOLUTION INTRAVENOUS at 04:30

## 2021-01-01 RX ADMIN — ATORVASTATIN CALCIUM 40 MILLIGRAM(S): 80 TABLET, FILM COATED ORAL at 21:15

## 2021-01-01 RX ADMIN — PANTOPRAZOLE SODIUM 40 MILLIGRAM(S): 20 TABLET, DELAYED RELEASE ORAL at 12:13

## 2021-01-01 RX ADMIN — Medication 1 MILLIGRAM(S): at 12:12

## 2021-01-01 RX ADMIN — Medication 650 MILLIGRAM(S): at 17:31

## 2021-01-01 RX ADMIN — CHLORHEXIDINE GLUCONATE 1 APPLICATION(S): 213 SOLUTION TOPICAL at 06:15

## 2021-01-01 RX ADMIN — Medication 600 MILLIGRAM(S): at 05:10

## 2021-01-01 RX ADMIN — SODIUM CHLORIDE 50 MILLILITER(S): 5 INJECTION, SOLUTION INTRAVENOUS at 12:06

## 2021-01-01 RX ADMIN — Medication 650 MILLIGRAM(S): at 17:01

## 2021-01-01 RX ADMIN — NICARDIPINE HYDROCHLORIDE 10 MG/HR: 30 CAPSULE, EXTENDED RELEASE ORAL at 18:24

## 2021-01-01 RX ADMIN — Medication 10 MILLIGRAM(S): at 16:52

## 2021-01-01 RX ADMIN — HEPARIN SODIUM 5000 UNIT(S): 5000 INJECTION INTRAVENOUS; SUBCUTANEOUS at 14:34

## 2021-01-01 RX ADMIN — CHLORHEXIDINE GLUCONATE 1 APPLICATION(S): 213 SOLUTION TOPICAL at 05:29

## 2021-01-01 RX ADMIN — SODIUM CHLORIDE 50 MILLILITER(S): 5 INJECTION, SOLUTION INTRAVENOUS at 14:09

## 2021-01-01 RX ADMIN — Medication 10 MILLIGRAM(S): at 16:00

## 2021-01-01 RX ADMIN — Medication 650 MILLIGRAM(S): at 00:08

## 2021-01-01 RX ADMIN — PIPERACILLIN AND TAZOBACTAM 25 GRAM(S): 4; .5 INJECTION, POWDER, LYOPHILIZED, FOR SOLUTION INTRAVENOUS at 21:10

## 2021-01-01 RX ADMIN — VECURONIUM BROMIDE 10 MILLIGRAM(S): 20 INJECTION, POWDER, FOR SOLUTION INTRAVENOUS at 08:30

## 2021-01-01 RX ADMIN — NICARDIPINE HYDROCHLORIDE 10 MG/HR: 30 CAPSULE, EXTENDED RELEASE ORAL at 17:29

## 2021-01-01 RX ADMIN — CHLORHEXIDINE GLUCONATE 15 MILLILITER(S): 213 SOLUTION TOPICAL at 06:14

## 2021-01-01 RX ADMIN — Medication 1 APPLICATION(S): at 05:58

## 2021-01-01 RX ADMIN — LEVETIRACETAM 400 MILLIGRAM(S): 250 TABLET, FILM COATED ORAL at 06:00

## 2021-01-01 RX ADMIN — Medication 650 MILLIGRAM(S): at 19:22

## 2021-01-01 RX ADMIN — Medication 600 MILLIGRAM(S): at 21:15

## 2021-01-01 RX ADMIN — PIPERACILLIN AND TAZOBACTAM 25 GRAM(S): 4; .5 INJECTION, POWDER, LYOPHILIZED, FOR SOLUTION INTRAVENOUS at 05:48

## 2021-01-01 RX ADMIN — ETOMIDATE 20 MILLIGRAM(S): 2 INJECTION INTRAVENOUS at 08:30

## 2021-01-01 RX ADMIN — Medication 200 MILLIGRAM(S): at 05:02

## 2021-01-01 RX ADMIN — LEVETIRACETAM 400 MILLIGRAM(S): 250 TABLET, FILM COATED ORAL at 06:17

## 2021-01-01 RX ADMIN — LEVETIRACETAM 400 MILLIGRAM(S): 250 TABLET, FILM COATED ORAL at 18:20

## 2021-01-01 RX ADMIN — CHLORHEXIDINE GLUCONATE 15 MILLILITER(S): 213 SOLUTION TOPICAL at 18:09

## 2021-01-01 RX ADMIN — Medication 10 MILLIGRAM(S): at 13:35

## 2021-01-01 RX ADMIN — HEPARIN SODIUM 5000 UNIT(S): 5000 INJECTION INTRAVENOUS; SUBCUTANEOUS at 14:08

## 2021-01-01 RX ADMIN — Medication 32.1 MICROGRAM(S)/KG/MIN: at 14:14

## 2021-01-01 RX ADMIN — Medication 250 MILLIGRAM(S): at 12:08

## 2021-01-01 RX ADMIN — Medication 400 MILLIGRAM(S): at 00:25

## 2021-01-01 RX ADMIN — CHLORHEXIDINE GLUCONATE 15 MILLILITER(S): 213 SOLUTION TOPICAL at 17:02

## 2021-01-01 RX ADMIN — Medication 600 MILLIGRAM(S): at 05:06

## 2021-01-01 RX ADMIN — CHLORHEXIDINE GLUCONATE 15 MILLILITER(S): 213 SOLUTION TOPICAL at 05:08

## 2021-01-01 RX ADMIN — NICARDIPINE HYDROCHLORIDE 10 MG/HR: 30 CAPSULE, EXTENDED RELEASE ORAL at 05:03

## 2021-01-01 RX ADMIN — PANTOPRAZOLE SODIUM 40 MILLIGRAM(S): 20 TABLET, DELAYED RELEASE ORAL at 12:06

## 2021-01-01 RX ADMIN — HEPARIN SODIUM 5000 UNIT(S): 5000 INJECTION INTRAVENOUS; SUBCUTANEOUS at 13:08

## 2021-01-01 RX ADMIN — Medication 40 MILLIGRAM(S): at 03:53

## 2021-01-01 RX ADMIN — PROPOFOL 12.8 MICROGRAM(S)/KG/MIN: 10 INJECTION, EMULSION INTRAVENOUS at 14:09

## 2021-01-01 RX ADMIN — HYDROMORPHONE HYDROCHLORIDE 0.5 MILLIGRAM(S): 2 INJECTION INTRAMUSCULAR; INTRAVENOUS; SUBCUTANEOUS at 18:40

## 2021-01-01 RX ADMIN — CHLORHEXIDINE GLUCONATE 15 MILLILITER(S): 213 SOLUTION TOPICAL at 05:01

## 2021-01-01 RX ADMIN — PIPERACILLIN AND TAZOBACTAM 25 GRAM(S): 4; .5 INJECTION, POWDER, LYOPHILIZED, FOR SOLUTION INTRAVENOUS at 05:14

## 2021-01-01 RX ADMIN — LEVETIRACETAM 400 MILLIGRAM(S): 250 TABLET, FILM COATED ORAL at 05:14

## 2021-01-01 RX ADMIN — LEVETIRACETAM 400 MILLIGRAM(S): 250 TABLET, FILM COATED ORAL at 17:35

## 2021-01-01 RX ADMIN — LEVETIRACETAM 400 MILLIGRAM(S): 250 TABLET, FILM COATED ORAL at 05:21

## 2021-01-01 RX ADMIN — NICARDIPINE HYDROCHLORIDE 10 MG/HR: 30 CAPSULE, EXTENDED RELEASE ORAL at 15:38

## 2021-01-01 RX ADMIN — Medication 1 APPLICATION(S): at 17:50

## 2021-01-01 RX ADMIN — HEPARIN SODIUM 5000 UNIT(S): 5000 INJECTION INTRAVENOUS; SUBCUTANEOUS at 13:47

## 2021-01-01 RX ADMIN — Medication 0.1 MILLIGRAM(S): at 12:24

## 2021-01-01 RX ADMIN — CHLORHEXIDINE GLUCONATE 1 APPLICATION(S): 213 SOLUTION TOPICAL at 05:59

## 2021-01-01 RX ADMIN — PIPERACILLIN AND TAZOBACTAM 25 GRAM(S): 4; .5 INJECTION, POWDER, LYOPHILIZED, FOR SOLUTION INTRAVENOUS at 20:26

## 2021-01-01 RX ADMIN — Medication 650 MILLIGRAM(S): at 17:40

## 2021-01-01 RX ADMIN — ROBINUL 0.2 MILLIGRAM(S): 0.2 INJECTION INTRAMUSCULAR; INTRAVENOUS at 12:16

## 2021-01-01 RX ADMIN — Medication 650 MILLIGRAM(S): at 13:40

## 2021-01-01 RX ADMIN — CHLORHEXIDINE GLUCONATE 15 MILLILITER(S): 213 SOLUTION TOPICAL at 18:06

## 2021-01-01 RX ADMIN — Medication 10 MILLIGRAM(S): at 09:15

## 2021-01-01 RX ADMIN — PANTOPRAZOLE SODIUM 40 MILLIGRAM(S): 20 TABLET, DELAYED RELEASE ORAL at 13:18

## 2021-01-01 RX ADMIN — CHLORHEXIDINE GLUCONATE 15 MILLILITER(S): 213 SOLUTION TOPICAL at 17:49

## 2021-01-01 RX ADMIN — HYDROMORPHONE HYDROCHLORIDE 0.5 MILLIGRAM(S): 2 INJECTION INTRAMUSCULAR; INTRAVENOUS; SUBCUTANEOUS at 08:46

## 2021-01-01 RX ADMIN — PANTOPRAZOLE SODIUM 40 MILLIGRAM(S): 20 TABLET, DELAYED RELEASE ORAL at 12:26

## 2021-01-01 RX ADMIN — PANTOPRAZOLE SODIUM 40 MILLIGRAM(S): 20 TABLET, DELAYED RELEASE ORAL at 12:02

## 2021-01-01 RX ADMIN — PIPERACILLIN AND TAZOBACTAM 25 GRAM(S): 4; .5 INJECTION, POWDER, LYOPHILIZED, FOR SOLUTION INTRAVENOUS at 06:00

## 2021-01-01 RX ADMIN — Medication 20 MILLIGRAM(S): at 10:19

## 2021-01-01 RX ADMIN — CHLORHEXIDINE GLUCONATE 15 MILLILITER(S): 213 SOLUTION TOPICAL at 18:48

## 2021-01-01 RX ADMIN — PROPOFOL 12.8 MICROGRAM(S)/KG/MIN: 10 INJECTION, EMULSION INTRAVENOUS at 17:28

## 2021-01-01 RX ADMIN — Medication 0.1 MILLIGRAM(S): at 10:19

## 2021-01-01 RX ADMIN — Medication 600 MILLIGRAM(S): at 05:59

## 2021-01-01 RX ADMIN — PIPERACILLIN AND TAZOBACTAM 200 GRAM(S): 4; .5 INJECTION, POWDER, LYOPHILIZED, FOR SOLUTION INTRAVENOUS at 01:08

## 2021-01-01 RX ADMIN — HEPARIN SODIUM 5000 UNIT(S): 5000 INJECTION INTRAVENOUS; SUBCUTANEOUS at 21:14

## 2021-01-01 RX ADMIN — PIPERACILLIN AND TAZOBACTAM 25 GRAM(S): 4; .5 INJECTION, POWDER, LYOPHILIZED, FOR SOLUTION INTRAVENOUS at 14:08

## 2021-01-01 RX ADMIN — PIPERACILLIN AND TAZOBACTAM 25 GRAM(S): 4; .5 INJECTION, POWDER, LYOPHILIZED, FOR SOLUTION INTRAVENOUS at 21:40

## 2021-01-01 RX ADMIN — PIPERACILLIN AND TAZOBACTAM 25 GRAM(S): 4; .5 INJECTION, POWDER, LYOPHILIZED, FOR SOLUTION INTRAVENOUS at 20:24

## 2021-01-01 RX ADMIN — HEPARIN SODIUM 5000 UNIT(S): 5000 INJECTION INTRAVENOUS; SUBCUTANEOUS at 15:04

## 2021-01-01 RX ADMIN — Medication 250 MILLIGRAM(S): at 04:45

## 2021-01-01 RX ADMIN — HEPARIN SODIUM 5000 UNIT(S): 5000 INJECTION INTRAVENOUS; SUBCUTANEOUS at 05:59

## 2021-01-01 RX ADMIN — HEPARIN SODIUM 5000 UNIT(S): 5000 INJECTION INTRAVENOUS; SUBCUTANEOUS at 05:16

## 2021-01-01 RX ADMIN — Medication 400 MILLIGRAM(S): at 13:08

## 2021-01-01 RX ADMIN — LEVETIRACETAM 400 MILLIGRAM(S): 250 TABLET, FILM COATED ORAL at 17:33

## 2021-01-01 RX ADMIN — CHLORHEXIDINE GLUCONATE 15 MILLILITER(S): 213 SOLUTION TOPICAL at 19:17

## 2021-01-01 RX ADMIN — Medication 200 MILLIGRAM(S): at 09:39

## 2021-01-01 RX ADMIN — Medication 10 MILLIGRAM(S): at 15:20

## 2021-01-01 RX ADMIN — PROPOFOL 12.8 MICROGRAM(S)/KG/MIN: 10 INJECTION, EMULSION INTRAVENOUS at 21:11

## 2021-01-01 RX ADMIN — Medication 600 MILLIGRAM(S): at 21:04

## 2021-01-01 RX ADMIN — Medication 1000 MILLIGRAM(S): at 00:30

## 2021-01-01 RX ADMIN — Medication 2 MILLIGRAM(S): at 00:25

## 2021-01-01 RX ADMIN — PANTOPRAZOLE SODIUM 40 MILLIGRAM(S): 20 TABLET, DELAYED RELEASE ORAL at 12:10

## 2021-01-01 RX ADMIN — Medication 5 MILLIGRAM(S): at 00:09

## 2021-01-01 RX ADMIN — Medication 0.2 MILLIGRAM(S): at 21:47

## 2021-01-01 RX ADMIN — Medication 650 MILLIGRAM(S): at 16:30

## 2021-01-01 RX ADMIN — Medication 5 MILLIGRAM(S): at 12:02

## 2021-01-01 RX ADMIN — Medication 300 MILLIGRAM(S): at 02:18

## 2021-01-01 RX ADMIN — Medication 600 MILLIGRAM(S): at 14:10

## 2021-01-01 RX ADMIN — Medication 0.2 MILLIGRAM(S): at 05:08

## 2021-01-01 RX ADMIN — Medication 10 MILLIGRAM(S): at 09:46

## 2021-01-01 RX ADMIN — Medication 650 MILLIGRAM(S): at 17:23

## 2021-01-01 RX ADMIN — CHLORHEXIDINE GLUCONATE 15 MILLILITER(S): 213 SOLUTION TOPICAL at 06:29

## 2021-01-01 RX ADMIN — Medication 100 GRAM(S): at 04:17

## 2021-01-01 RX ADMIN — Medication 200 MILLIGRAM(S): at 03:53

## 2021-01-01 RX ADMIN — CHLORHEXIDINE GLUCONATE 1 APPLICATION(S): 213 SOLUTION TOPICAL at 05:15

## 2021-01-01 RX ADMIN — PIPERACILLIN AND TAZOBACTAM 25 GRAM(S): 4; .5 INJECTION, POWDER, LYOPHILIZED, FOR SOLUTION INTRAVENOUS at 21:05

## 2021-01-01 RX ADMIN — Medication 400 MILLIGRAM(S): at 21:46

## 2021-01-01 RX ADMIN — SODIUM CHLORIDE 50 MILLILITER(S): 5 INJECTION, SOLUTION INTRAVENOUS at 18:24

## 2021-01-01 RX ADMIN — PANTOPRAZOLE SODIUM 40 MILLIGRAM(S): 20 TABLET, DELAYED RELEASE ORAL at 14:08

## 2021-01-01 RX ADMIN — PANTOPRAZOLE SODIUM 40 MILLIGRAM(S): 20 TABLET, DELAYED RELEASE ORAL at 12:21

## 2021-01-01 RX ADMIN — PIPERACILLIN AND TAZOBACTAM 25 GRAM(S): 4; .5 INJECTION, POWDER, LYOPHILIZED, FOR SOLUTION INTRAVENOUS at 05:54

## 2021-01-01 RX ADMIN — Medication 650 MILLIGRAM(S): at 10:00

## 2021-01-01 RX ADMIN — HEPARIN SODIUM 5000 UNIT(S): 5000 INJECTION INTRAVENOUS; SUBCUTANEOUS at 21:15

## 2021-01-01 RX ADMIN — Medication 650 MILLIGRAM(S): at 18:46

## 2021-08-13 NOTE — ED ADULT NURSE NOTE - OBJECTIVE STATEMENT
Pt brought in by EMS who states pt has slurred speech, right sided weakness, and right sided facial droop. On arrival to ED pt unable to provide information due to slurred speech and brought straight to CT scan. Fingerstick on arrival 117.

## 2021-08-13 NOTE — CHART NOTE - NSCHARTNOTEFT_GEN_A_CORE
Spoke with neurosurgery regarding Keppra for seizure prophylaxis. They reported that Keppra is not indicated in deep intraparenchymal hematoma. We will hold off on Keppra for now as it is not indicated.    Franny Camacho, PGY2

## 2021-08-13 NOTE — ED ADULT NURSE REASSESSMENT NOTE - NS ED NURSE REASSESS COMMENT FT1
neurosurg MD Max states Bp stystolic under 160, MD foster aware and ok with current BP given cardene is maxed

## 2021-08-13 NOTE — ED ADULT NURSE NOTE - CHIEF COMPLAINT QUOTE
Pt BIB EMS for left sided weakness and slurred speech an hour ago (07:45). Finger stick 114 mg/dl by EMS. Pt denies PMHX. Dr. Oliva evaluating. Pt taken to CT.

## 2021-08-13 NOTE — ED PROVIDER NOTE - ATTENDING CONTRIBUTION TO CARE
------------ATTENDING NOTE------------   pt transferred for acute nontraumatic hemorrhagic stroke from uncontrolled hypertension, significant neuro deficits but protecting airway/following commands, NSGY and Neuro consulted on arrival, awaiting repeat labs/imaging and close reassessments -->  - Clifford Garces MD   ---------------------------------------------- ------------ATTENDING NOTE------------   pt transferred for acute nontraumatic hemorrhagic stroke from uncontrolled hypertension, significant neuro deficits but protecting airway/following commands, NSGY and Neuro consulted on arrival, awaiting repeat labs/imaging and close reassessments --> (0012) called by radiology for concerns of Ao dissection, CTA c/a/p, d/w CT Surgery Fellow and Vascular Surgery team, HD stable, continue close reassessments -->  - Clifford Garces MD   ---------------------------------------------- ------------ATTENDING NOTE------------   pt transferred for acute nontraumatic hemorrhagic stroke from uncontrolled hypertension, significant neuro deficits but protecting airway/following commands, NSGY and Neuro consulted on arrival, awaiting repeat labs/imaging and close reassessments --> (1315) called by radiology for concerns of Ao dissection, CTA c/a/p, d/w CT Surgery Fellow and Vascular Surgery team, HD stable, continue close reassessments --> (13:45) CTS at bedside for Type A dissection, coordinating w/ NSGY / other teams for tx/dispo mgmt.  - Clifford Garces MD   ---------------------------------------------- ------------ATTENDING NOTE------------   pt transferred for acute nontraumatic hemorrhagic stroke from uncontrolled hypertension, significant neuro deficits but protecting airway/following commands, NSGY and Neuro consulted on arrival, awaiting repeat labs/imaging and close reassessments --> (1315) called by radiology for concerns of Ao dissection, CTA c/a/p, d/w CT Surgery Fellow and Vascular Surgery team, HD stable, continue close reassessments --> (13:45) CTS at bedside for Type A dissection, coordinating w/ NSGY / other teams for tx/dispo mgmt --> (15:00) pt remaining HD stable/BP control, coordinating w/ consults for admission and interventions/treatments at ED Sign Out.  - Clifford Garces MD   ----------------------------------------------

## 2021-08-13 NOTE — H&P ADULT - ASSESSMENT
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 Currently pain free in ED on nicardipine and Esmolol gtt's with SBP in 110's.    Case d/w Dr Everett and Dr Savage, admit to CTU for arterial line, strict BP/anti-impulse control, goal SBP less than 120  Risks of emergent surgery, potential catastrophic neurological complications outweigh benefits at this time  admit to CTU, admit labs, cardiac enzymes, BNP, Type & screen x2, Urine Tox  Stat TTE  Monitor BUN/Creatnine, IVF hydration for multiple contrast loads  Repeat head CT per Neuro/NeuroSx recommendations  strict Neuro monitoring per stroke protocol

## 2021-08-13 NOTE — PROGRESS NOTE ADULT - SUBJECTIVE AND OBJECTIVE BOX
LUCY MACIAS  MRN-[id number]  Patient is a 56y old  Male who presents with a chief complaint of Stroke, left basal ganglia intraparenchymal hemorrhage (13 Aug 2021 14:59)    HPI:  57yo man w/ no known PMH, no medications, presents to ED as transfer from [facility] with left basal ganglia intraparenchymal hemorrhage and incidental finding of Type A dissection.  Patient's brother at bedside and provides history. 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 Pt denies any fall, syncope, trauma although per EMS reports patient had a fall and was found with word finding difficulty and right-sided weakness. At [place], CTH noncon was done and demonstrates left basal ganglia hemorrhage. Patient subsequently transferred to [facility] ED, repeat imaging showed possible Type A aortic dissection. Cardiothoracic surgery consulted, CTA CAP showed Type A dissection from distal ascending aorta to distal arch. Pt denies any chest pain, palpitations, SOB, N/V/D, numbness, tingling in extremities. Pt in [facility] ED on nicardipine gtt. (13 Aug 2021 14:59)      Surgery/Hospital course:  8/13  Type A dissection, Acute Intraparenchymal hematoma     Today/Overnight:  Patient experienced a Stroke with a left basal ganglia intraparenchymal hemorrhage. He is currently on Esmolol, Nicardipine and Labetalol.     Vital Signs Last 24 Hrs  T(C): 36.5 (13 Aug 2021 17:15), Max: 37.1 (13 Aug 2021 10:28)  T(F): 97.7 (13 Aug 2021 17:15), Max: 98.7 (13 Aug 2021 10:28)  HR: 57 (13 Aug 2021 19:00) (57 - 81)  BP: 104/56 (13 Aug 2021 19:00) (92/55 - 220/112)  BP(mean): 73 (13 Aug 2021 19:00) (61 - 138)  RR: 20 (13 Aug 2021 19:00) (11 - 24)  SpO2: 96% (13 Aug 2021 19:00) (94% - 100%)  ============================I/O===========================  I&O's Detail    13 Aug 2021 07:01  -  13 Aug 2021 19:43  --------------------------------------------------------  IN:    Esmolol: 32.1 mL    Labetalol: 120 mL    NiCARdipine: 150 mL  Total IN: 302.1 mL    OUT:  Total OUT: 0 mL    Total NET: 302.1 mL        ============================ LABS =========================                        13.5   13.33 )-----------( 304      ( 13 Aug 2021 18:01 )             39.3     08-13    137  |  105  |  18  ----------------------------<  122<H>  4.4   |  21<L>  |  1.68<H>    Ca    9.1      13 Aug 2021 18:01  Phos  2.4     08-13  Mg     2.2     08-13    TPro  6.9  /  Alb  3.8  /  TBili  0.7  /  DBili  x   /  AST  26  /  ALT  19  /  AlkPhos  108  08-13    LIVER FUNCTIONS - ( 13 Aug 2021 18:01 )  Alb: 3.8 g/dL / Pro: 6.9 g/dL / ALK PHOS: 108 U/L / ALT: 19 U/L / AST: 26 U/L / GGT: x           PT/INR - ( 13 Aug 2021 18:01 )   PT: 12.4 sec;   INR: 1.04 ratio         PTT - ( 13 Aug 2021 18:01 )  PTT:32.0 sec  ABG - ( 13 Aug 2021 17:53 )  pH, Arterial: 7.41  pH, Blood: x     /  pCO2: 42    /  pO2: 80    / HCO3: 26    / Base Excess: 1.4   /  SaO2: 97                  ======================Micro/Rad/Cardio=================  CXR: Reviewed     FINDINGS:  Aorta: Dissection extending from the distal ascending aorta to just beyond the left subclavian artery origin. Partially thrombosed false lumen. Patent branches. No intramural hematoma in the chest.  4.1 cm ascending aorta at the level of the main pulmonary artery, just proximal to the dissection. Largest aortic diameter 5.4 cm of the distal ascending aorta at the start of the dissection.  Chest:  Mediastinum and Heart: Normal heart size. Normal caliber main pulmonary artery. Unremarkable thyroid. No enlarged lymph nodes.  Lungs, Pleura, and Airways: Patent airways to the segmental bronchi. clear lungs. Unremarkable pleura.  Bones and Soft Tissues: Degenerative changes of the spine.  Abdomen and Pelvis:  Liver: Unremarkable.  BiliarySystem: Unremarkable.  Spleen:Unremarkable.  Pancreas: Unremarkable.  Adrenals: Unremarkable.  Kidneys: Contrast within the collecting system from same date neck CT. Otherwise unremarkable.  Bowel: Unremarkable.  Pelvis: Unremarkable.  Other: No ascites. No enlarged lymph nodes.  Bones and Soft Tissues: Degenerative changes of the spine.    ======================================================  PAST MEDICAL & SURGICAL HISTORY:  No pertinent past medical history    No significant past surgical history      ========================ASSESSMENT ================  Left basal ganglia intraparenchymal hemorrhagic stroke   Type A dissection, Acute Intraparenchymal hematoma   CKD  Leukocytosis     Plan:  ====================== NEUROLOGY=====================  Left basal ganglia intraparenchymal hemorrhagic stroke   R sided weakness, unable to move right arm and leg  Continue to monitor neuro status per ICU protocol.     ==================== RESPIRATORY======================  Receiving supplemental O2 therapy with NC LPM 2.  Continue to monitor RR, breathing pattern, pulse ox, and ABG's.  Encourage incentive spirometry.     ====================CARDIOVASCULAR==================  Type A dissection, Acute Intraparenchymal hematoma   Continue with IV Nicardipine for blood pressure support  C/w beta blockers IV Esmolol and IV Labetalol for blood pressure and heart rate control.  Invasive hemodynamic monitoring, assess perfusion indices.     esMOLOL  Infusion 50 MICROgram(s)/kG/Min (32.1 mL/Hr) IV Continuous <Continuous>  labetalol Infusion 2 mG/Min (120 mL/Hr) IV Continuous <Continuous>  niCARdipine Infusion 5 mG/Hr (25 mL/Hr) IV Continuous <Continuous>    ===================HEMATOLOGIC/ONC ===================  Continue to monitor hemoglobin and hematocrit levels.     ===================== RENAL =========================  CKD, Continue to monitor I/Os, BUN/Creatinine, and urine output.   Goal net negative fluid balance. Replete lytes PRN. Keep K> 4 and Mg >2.      ==================== GASTROINTESTINAL===================  NPO after recent procedure, advance diet as tolerated.   Continue Protonix for stress ulcer prophylaxis.     pantoprazole  Injectable 40 milliGRAM(s) IV Push daily    =======================    ENDOCRINE  =====================  Bgl controlled, monitor glucose for need to initiate sliding scale.     ========================INFECTIOUS DISEASE================  Afebrile but Leukocytosis WBC: 13.33  Monitor temperature and trend WBC. Monitor off abx.     Patient requires continuous monitoring with bedside rhythm monitoring, pulse oximetry monitoring, and continuous central venous and arterial pressure monitoring; and intermittent blood gas analysis.  Care plan discussed with ICU care team.    Patient remained critical, at risk for life threatening decompensation.   I have spent 35 minutes providing acute care with multiple re-evaluations throughout the evening.     By signing my name below, I, Bethany Bobby, attest that this documentation has been prepared under the direction and in the presence of Nedra Owens NP.  Electronically signed: Shannon Servin, 08-13-21 @ 19:43    I, Nedra Owens NP, personally performed the services described in this documentation. All medical record entries made by the wiltonibe were at my direction and in my presence. I have reviewed the chart and agree that the record reflects my personal performance and is accurate and complete  Electronically signed: Nedra Owens NP, 08-13-21 @ 19:43       LUCY MACIAS  MRN-[id number]  Patient is a 56y old  Male who presents with a chief complaint of Stroke, left basal ganglia intraparenchymal hemorrhage (13 Aug 2021 14:59)    HPI:  55yo man w/ no known PMH, no medications, presents to ED as transfer from [facility] with left basal ganglia intraparenchymal hemorrhage and incidental finding of Type A dissection.  Patient's brother at bedside and provides history. 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 Pt denies any fall, syncope, trauma although per EMS reports patient had a fall and was found with word finding difficulty and right-sided weakness. At [place], CTH noncon was done and demonstrates left basal ganglia hemorrhage. Patient subsequently transferred to [facility] ED, repeat imaging showed possible Type A aortic dissection. Cardiothoracic surgery consulted, CTA CAP showed Type A dissection from distal ascending aorta to distal arch. Pt denies any chest pain, palpitations, SOB, N/V/D, numbness, tingling in extremities. Pt in [facility] ED on nicardipine gtt. (13 Aug 2021 14:59)      Surgery/Hospital course:  8/13  Type A dissection, Acute Intraparenchymal hematoma     Today/Overnight:  Patient experienced a Stroke with a left basal ganglia intraparenchymal hemorrhage. He is currently on Esmolol, Nicardipine and Labetalol.     Vital Signs Last 24 Hrs  T(C): 36.5 (13 Aug 2021 17:15), Max: 37.1 (13 Aug 2021 10:28)  T(F): 97.7 (13 Aug 2021 17:15), Max: 98.7 (13 Aug 2021 10:28)  HR: 57 (13 Aug 2021 19:00) (57 - 81)  BP: 104/56 (13 Aug 2021 19:00) (92/55 - 220/112)  BP(mean): 73 (13 Aug 2021 19:00) (61 - 138)  RR: 20 (13 Aug 2021 19:00) (11 - 24)  SpO2: 96% (13 Aug 2021 19:00) (94% - 100%)  ============================I/O===========================  I&O's Detail    13 Aug 2021 07:01  -  13 Aug 2021 19:43  --------------------------------------------------------  IN:    Esmolol: 32.1 mL    Labetalol: 120 mL    NiCARdipine: 150 mL  Total IN: 302.1 mL    OUT:  Total OUT: 0 mL    Total NET: 302.1 mL        ============================ LABS =========================                        13.5   13.33 )-----------( 304      ( 13 Aug 2021 18:01 )             39.3     08-13    137  |  105  |  18  ----------------------------<  122<H>  4.4   |  21<L>  |  1.68<H>    Ca    9.1      13 Aug 2021 18:01  Phos  2.4     08-13  Mg     2.2     08-13    TPro  6.9  /  Alb  3.8  /  TBili  0.7  /  DBili  x   /  AST  26  /  ALT  19  /  AlkPhos  108  08-13    LIVER FUNCTIONS - ( 13 Aug 2021 18:01 )  Alb: 3.8 g/dL / Pro: 6.9 g/dL / ALK PHOS: 108 U/L / ALT: 19 U/L / AST: 26 U/L / GGT: x           PT/INR - ( 13 Aug 2021 18:01 )   PT: 12.4 sec;   INR: 1.04 ratio         PTT - ( 13 Aug 2021 18:01 )  PTT:32.0 sec  ABG - ( 13 Aug 2021 17:53 )  pH, Arterial: 7.41  pH, Blood: x     /  pCO2: 42    /  pO2: 80    / HCO3: 26    / Base Excess: 1.4   /  SaO2: 97                  ======================Micro/Rad/Cardio=================  CXR: Reviewed     FINDINGS:  Aorta: Dissection extending from the distal ascending aorta to just beyond the left subclavian artery origin. Partially thrombosed false lumen. Patent branches. No intramural hematoma in the chest.  4.1 cm ascending aorta at the level of the main pulmonary artery, just proximal to the dissection. Largest aortic diameter 5.4 cm of the distal ascending aorta at the start of the dissection.  Chest:  Mediastinum and Heart: Normal heart size. Normal caliber main pulmonary artery. Unremarkable thyroid. No enlarged lymph nodes.  Lungs, Pleura, and Airways: Patent airways to the segmental bronchi. clear lungs. Unremarkable pleura.  Bones and Soft Tissues: Degenerative changes of the spine.  Abdomen and Pelvis:  Liver: Unremarkable.  BiliarySystem: Unremarkable.  Spleen:Unremarkable.  Pancreas: Unremarkable.  Adrenals: Unremarkable.  Kidneys: Contrast within the collecting system from same date neck CT. Otherwise unremarkable.  Bowel: Unremarkable.  Pelvis: Unremarkable.  Other: No ascites. No enlarged lymph nodes.  Bones and Soft Tissues: Degenerative changes of the spine.    ======================================================  PAST MEDICAL & SURGICAL HISTORY:  No pertinent past medical history    No significant past surgical history      ========================ASSESSMENT ================  Left basal ganglia intraparenchymal hemorrhagic stroke   Type A dissection, Acute Intraparenchymal hematoma   CKD  Leukocytosis     Plan:  ====================== NEUROLOGY=====================  Left basal ganglia intraparenchymal hemorrhagic stroke   R sided paralysus unable to move right arm and leg, Aphasic  Continue to monitor neuro status per ICU protocol.     ==================== RESPIRATORY======================  Receiving supplemental O2 therapy with NC LPM 2.  Continue to monitor RR, breathing pattern, pulse ox, and ABG's.  Encourage incentive spirometry.     ====================CARDIOVASCULAR==================  Type A dissection, Acute Intraparenchymal hematoma   Continue with IV Nicardipine for blood pressure support  C/w beta blockers IV Esmolol and IV Labetalol for blood pressure and heart rate control.  Invasive hemodynamic monitoring, assess perfusion indices.     labetalol Infusion 2 mG/Min (120 mL/Hr) IV Continuous <Continuous>  niCARdipine Infusion 5 mG/Hr (25 mL/Hr) IV Continuous <Continuous>    ===================HEMATOLOGIC/ONC ===================  Continue to monitor hemoglobin and hematocrit levels.     ===================== RENAL =========================  CKD, Continue to monitor I/Os, BUN/Creatinine, and urine output.   Goal net negative fluid balance. Replete lytes PRN. Keep K> 4 and Mg >2.      ==================== GASTROINTESTINAL===================  NPO after recent procedure, advance diet as tolerated.   Continue Protonix for stress ulcer prophylaxis.     pantoprazole  Injectable 40 milliGRAM(s) IV Push daily    =======================    ENDOCRINE  =====================  Bgl controlled, monitor glucose for need to initiate sliding scale.     ========================INFECTIOUS DISEASE================  Afebrile but Leukocytosis WBC: 13.33  Monitor temperature and trend WBC. Monitor off abx.     Patient requires continuous monitoring with bedside rhythm monitoring, pulse oximetry monitoring, and continuous central venous and arterial pressure monitoring; and intermittent blood gas analysis.  Care plan discussed with ICU care team.    Patient remained critical, at risk for life threatening decompensation.   I have spent 35 minutes providing acute care with multiple re-evaluations throughout the evening.     By signing my name below, I, Bethany Bobby, attest that this documentation has been prepared under the direction and in the presence of Nedra Owens NP.  Electronically signed: Shannon Servin, 08-13-21 @ 19:43    I, Nedra Owens NP, personally performed the services described in this documentation. All medical record entries made by the scribe were at my direction and in my presence. I have reviewed the chart and agree that the record reflects my personal performance and is accurate and complete  Electronically signed: Nedra Owens NP, 08-13-21 @ 19:43

## 2021-08-13 NOTE — ED PROVIDER NOTE - OBJECTIVE STATEMENT
57 yo M with no pmh presenting as transfer from [place] for intraparenchymal basal ganglia hemorrhage. Per EMS patient fell and called EMS and when they got there patient was aphasic with weakness on right side of body. In [place], patient found to have ICH and cardine drip initiated. Patient transferred to [facility] for neurosurgery. Patient aphasic and it seems that patient can understand (can follow commands) but is having a hard time communicating.

## 2021-08-13 NOTE — H&P ADULT - HISTORY OF PRESENT ILLNESS
57yo man w/ no known PMH, no medications, presents to ED as transfer from [facility] with left basal ganglia intraparenchymal hemorrhage and incidental finding of Type A dissection.  Patient's brother at bedside and provides history. 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 Pt denies any fall, syncope, trauma although per EMS reports patient had a fall and was found with word finding difficulty and right-sided weakness. At [place], CTH noncon was done and demonstrates left basal ganglia hemorrhage. Patient subsequently transferred to [facility] ED, repeat imaging showed possible Type A aortic dissection. Cardiothoracic surgery consulted, CTA CAP showed Type A dissection from distal ascending aorta to distal arch. Pt denies any chest pain, palpitations, SOB, N/V/D, numbness, tingling in extremities. Pt in [facility] ED on nicardipine gtt.

## 2021-08-13 NOTE — H&P ADULT - ATTENDING COMMENTS
Mr. Jason is a 56M with uncontrolled HTN who presented to OSH with stroke symptoms (aphasia, right hemiparesis) and left basal ganglia intraparenchymal hemorrhage on head CT, subsequent CTA neck concerning for aortic dissection.  He was transferred here and Type A dissection confirmed on CTA chest with distal ascending and arch involvement but no evidence of effusion, coronary involvement, or malperfusion.  He is alert and responsive but remains largely aphasic and with right hemiparesis.  Given the extent of brain hemorrhage and in discussion with neuro surgery regarding the risk of CPB with full heparinization on worsening hemorrhage in acute setting with surgical repair, we will admit to CTU and keep tight impulse control and neuro checks with head imaging follow-up per neurosurgery.

## 2021-08-13 NOTE — ED ADULT TRIAGE NOTE - CHIEF COMPLAINT QUOTE
Pt BIB EMS for left sided weakness an hour ago. Pt BIB EMS for left sided weakness an hour ago. Dr. Oliva evaluating. Pt taken to CT. Pt BIB EMS for left sided weakness and slurred speech an hour ago. Finger stick 114 mg/dl by EMS. Pt denies PMHX. Dr. Oliva evaluating. Pt taken to CT. Pt BIB EMS for left sided weakness and slurred speech an hour ago (07:45). Finger stick 114 mg/dl by EMS. Pt denies PMHX. Dr. Oliva evaluating. Pt taken to CT.

## 2021-08-13 NOTE — ED PROVIDER NOTE - CARE PLAN
Principal Discharge DX:	Nontraumatic intracerebral hemorrhage of basal ganglia  Secondary Diagnosis:	Uncontrolled hypertension   1

## 2021-08-13 NOTE — CONSULT NOTE ADULT - ASSESSMENT
Assessment: 55yo RH man w/ h/o chronic pain takes advil and aleve presenting as transfer from [facility] for left BG hemorrhage with Neurology consult for such. Onur, patient's brother () at bedside and provides history. Neurological exam demonstrates forced left gaze deviation, right hemiparesis, global aphasia, right-sided neglect. CTH noncon upon my review demonstrates L BG hemorrhage with IVH. CT angio chest concerning for possible dissection.    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 2/2 possible in setting of HTN versus possible drug use such as cocaine versus undetermined connective tissue disease causing possible aortic dissection    Recommendations:  accepted by NSCU  q1h neuro checks  MRI head w/w/o contrast  CT/CTA H & N - f/u final read  CT angio chest - f/u final read  may need CT surgery for aortic dissection  echo  UA, urine tox  may consider CT c/a/p if concern for underlying malignancy  may warrant genetic testing for connective tissue disorders such as Marfan's, EDS, LDS  hold antiplatelets/antithrombotics at this time  a1c, lipid profile  PT/OT/SS    case d/w Stroke Fellow Dr. Enid Valenzuela; to d/w Stroke Attending Dr. Zachary Chambers

## 2021-08-13 NOTE — ED PROVIDER NOTE - PHYSICAL EXAMINATION
Gen: Alert and oriented. Patient aphasic. Able to somewhat communicate (answering questions intermittently) and follows commands  HEENT: Atraumatic, Left sided gaze preference otherwise EOM in tact, right sided facial droop, no nasal discharge, mucous membranes moist  CV: Regular rate and rhythm, +S1/S2, no murmurs/rubs/gallops,   Resp: Clear to ausculation bilaterally, no wheezes/rhonchi/rales  GI: Abdomen soft non-distended, non tender to palpation, no masses  MSK: No open wounds, no bruising, no LE edema  Neuro: 5/5 strength on left side. No strength in RUE and LUE  Psych: unable to assess

## 2021-08-13 NOTE — CONSULT NOTE ADULT - NSCONSULTADDITIONALINFOA_GEN_ALL_CORE
Patient intubated for worsening of mental status, in CT now, unable to examine the patient at the moment.  -Zachary Chambers MD

## 2021-08-13 NOTE — CONSULT NOTE ADULT - ASSESSMENT
Luan Jasno    56M no AC/AP xfer from  for L BG heme. On presentation was 230 systolic. CTH shows L [id number] BG heme. ICH score 0.  Exam: AOx1, 2 with choices, expressive aphasia, L side 5/5, RUE 0/5, RLE WD, R facial, L gaze cannot overcome.   -Repeat CTH/CTA at 6hrs (3pm)  -ARU/PRU  -Stroke consult  -If CTH stable stroke unit, if expanding/evidence of active bleed NSCU under intensivity  -AEDs per neurology  -BP <160 Luan Jason    56M no AC/AP xfer from  for L BG heme. On presentation was 230 systolic. CTH shows L [id number] BG heme. ICH score 0.  Exam: AOx1, 2 with choices, expressive aphasia, L side 5/5, RUE 0/5, RLE WD, R facial, L gaze cannot overcome.   -Repeat CTH/CTA at 6hrs (3pm)  -ARU/PRU  -Stroke consult  -If CTH stable stroke unit, if expanding/evidence of active bleed NSCU under intensivity  -AEDs per neurology  -BP <160    Update:  Repeat CTH shows stable hemorrhage size, no spot sign on CTA, but interval dissection into L Lateral ventricle. Recommend repeat CTH in the AM. The risk of worsening hemorrhage for this patient with high dose heparinization for any emergent aortic repair is approximately 90%, if not higher. This risk will drop off the further out we are from the initial hemorrhage. International expert consensus is that at 2 weeks the risk acceptable (likely <5%), and at 4 weeks there is minimal to no risk. The curve drops off logarithmically the further out from the hemorrhage. Case was discussed with Dr. Navas, there must be careful consideration of the patients risk of dissection propagation and death, and if that risk outweighs the risk of devastating intracranial hemorrhage than CT surgery should proceed with surgery despite the risks of high dose heparinization. However, if the risk of hemorrhage outweighs those risks than we would recommend delaying surgery as long as possible to reduce the risk as much as possible. There are no plans for neurosurgical intervention at this time. Recommend a Na goal of 135-145.   Luan Jason    56M no AC/AP xfer from  for L BG heme. On presentation was 230 systolic. CTH shows L 6o3p5la BG heme. ICH score 0.  Exam: AOx1, 2 with choices, expressive aphasia, L side 5/5, RUE 0/5, RLE WD, R facial, L gaze cannot overcome.   -Repeat CTH/CTA at 6hrs (3pm)  -ARU/PRU  -Stroke consult  -If CTH stable stroke unit, if expanding/evidence of active bleed NSCU under intensivity  -AEDs per neurology  -BP <160    Update:  Repeat CTH shows stable hemorrhage size, no spot sign on CTA, but interval dissection into L Lateral ventricle. Recommend repeat CTH in the AM. The risk of worsening hemorrhage for this patient with high dose heparinization for any emergent aortic repair is approximately 90%, if not higher. This risk will drop off the further out we are from the initial hemorrhage. International expert consensus is that at 2 weeks the risk acceptable (likely <5%), and at 4 weeks there is minimal to no risk. The curve drops off logarithmically the further out from the hemorrhage. Case was discussed with Dr. Navas, there must be careful consideration of the patients risk of dissection propagation and death, and if that risk outweighs the risk of devastating intracranial hemorrhage than CT surgery should proceed with surgery despite the risks of high dose heparinization. However, if the risk of hemorrhage outweighs those risks than we would recommend delaying surgery as long as possible to reduce the risk as much as possible. There are no plans for neurosurgical intervention at this time. Recommend a Na goal of 135-145.  If patient has worsening exam repeat CTH to r/o hydrocephalus although given minimal IVH this is unlikely.

## 2021-08-13 NOTE — ED ADULT NURSE NOTE - OBJECTIVE STATEMENT
56 y M with no pmhx presents to the ED transfer from [facility] [place] with a head bleed. EMS states that he went to work this morning and passed out and woke up around 0830 and he called EMS. Unwitnessed. Unknown last known normal. EMS States pt takes no medications daily and has no allergies. Pt presents with R sided facial droop, R gaze, R sided paralysis, aphasia (any words that he can get out are slurred). On assessment, Alert and nods head to commands. R pupil 5 mm and reactive Left pupil 4 mm and reactive. R leg and R arm are immobile. L arm and L leg are weak. Breathing spontaneously and unlabored on Room air. Pt has a mild gurgling sound with respirations. 56 y M with no pmhx presents to the ED transfer from [facility] with a head bleed. EMS states that he went to work this morning and passed out and woke up around 0830 and he called EMS. Unwitnessed. Unknown last known normal. EMS States pt takes no medications daily and has no allergies. Pt presents with R sided facial droop, R gaze, R sided paralysis, aphasia (any words that he can get out are slurred). On assessment, Alert and nods head to commands. R pupil 5 mm and reactive Left pupil 4 mm and reactive. R leg and R arm are immobile. L arm and L leg are strong. Breathing spontaneously and unlabored on Room air. Pt has a mild gurgling sound with respirations, RN failed pt on dysphagia. No Peripheral edema. Cap refill 2s. No JVD. Peripheral pulses strong and equal bilaterally. On cardiac monitor, afib. Abdomen soft, nontender, distended, negative CVA tenderness, positive bowel sounds in all four quadrants. Pt is incontinent. Pt changed and repositioned. IV placed 18g in R wrist and LAC and 20g in RAC. Labs drawn and sent. Pt safety maintained. Call bell within reach. Side rails in upward position. Pt awaiting dispo.  Neuroflowsheet placed in chart. MD Garces states to titrate Cardene to MAP 70 and -130s.

## 2021-08-13 NOTE — ED ADULT NURSE NOTE - NSIMPLEMENTINTERV_GEN_ALL_ED
Implemented All Fall Risk Interventions:  Bryan to call system. Call bell, personal items and telephone within reach. Instruct patient to call for assistance. Room bathroom lighting operational. Non-slip footwear when patient is off stretcher. Physically safe environment: no spills, clutter or unnecessary equipment. Stretcher in lowest position, wheels locked, appropriate side rails in place. Provide visual cue, wrist band, yellow gown, etc. Monitor gait and stability. Monitor for mental status changes and reorient to person, place, and time. Review medications for side effects contributing to fall risk. Reinforce activity limits and safety measures with patient and family.

## 2021-08-13 NOTE — H&P ADULT - NSHPLABSRESULTS_GEN_ALL_CORE
14.2   13.33 )-----------( 302      ( 13 Aug 2021 14:08 )             41.2     08-13    138  |  102  |  17  ----------------------------<  124<H>  4.0   |  23  |  1.67<H>    Ca    8.9      13 Aug 2021 14:08  Mg     2.1     08-13    TPro  7.4  /  Alb  4.3  /  TBili  0.5  /  DBili  x   /  AST  26  /  ALT  19  /  AlkPhos  117  08-13    PT/INR - ( 13 Aug 2021 09:05 )   PT: 10.9 sec;   INR: 0.90 ratio         PTT - ( 13 Aug 2021 09:05 )  PTT:35.6 sec    CARDIAC MARKERS ( 13 Aug 2021 09:05 )  .055 ng/mL / x     / x     / x     / x          CTA CAP    IMPRESSION:    As on same day CT neck, type A aortic dissection from the distal ascending aorta to distal arch proximal to just beyond the left subclavian artery origin. No further extension into the thorax.    5.4 cm ascending aortic aneurysm, at the level of the proximal dissection flap.      CTA Brain/Neck    IMPRESSION:  CT brain: No significant interval change in size of the acute intraparenchymal hematoma within the left basal ganglia, currently measuring 5.3 x 2.3 x 3.3 cm. There is, however, dissection into the left lateral ventricle, new from prior study, with a small amount of hemorrhage in the left occipital horn. There is mild surrounding mass effect without appreciable midline shift. Basal cisterns are patent.    CTA neck: No stenosis. Acute dissection of the aortic arch without propagation of the dissection flap into the great vessels. CTA chest, abdomen, and pelvis is recommended to evaluate extent of the dissection flap.    CTA brain: No stenosis or aneurysm.

## 2021-08-14 NOTE — CONSULT NOTE ADULT - SUBJECTIVE AND OBJECTIVE BOX
[facility]  [phone]  NEPHROLOGY      INITIAL CONSULT NOTE  --------------------------------------------------------------------------------  HPI:    57yo man w/ no known PMH, no medications, presents to ED as transfer from [facility] with left basal ganglia intraparenchymal hemorrhage and incidental finding of Type A dissection.  Patient's brother at bedside and provides history. 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 At [place], CTH noncon was done and demonstrates left basal ganglia hemorrhage. Patient subsequently transferred to [facility] ED, repeat imaging showed possible Type A aortic dissection. Cardiothoracic surgery consulted, CTA CAP showed Type A dissection from distal ascending aorta to distal arch. Pt in [facility] ED started on  nicardipine gtt.  pt also noticed with cr 2. per brother pt has not seen a primary doc. renal consult called. pt required intubation for airway protection       PAST HISTORY  --------------------------------------------------------------------------------  PAST MEDICAL & SURGICAL HISTORY:  No pertinent past medical history    No significant past surgical history      FAMILY HISTORY:        PAST SOCIAL HISTORY:    ALLERGIES & MEDICATIONS  --------------------------------------------------------------------------------  Allergies    No Known Allergies    Intolerances      Standing Inpatient Medications  chlorhexidine 0.12% Liquid 15 milliLiter(s) Oral Mucosa every 12 hours  labetalol Infusion 2 mG/Min IV Continuous <Continuous>  niCARdipine Infusion 5 mG/Hr IV Continuous <Continuous>  pantoprazole  Injectable 40 milliGRAM(s) IV Push daily  propofol Infusion 20 MICROgram(s)/kG/Min IV Continuous <Continuous>    PRN Inpatient Medications      REVIEW OF SYSTEMS  --------------------------------------------------------------------------------  intubated     VITALS/PHYSICAL EXAM  --------------------------------------------------------------------------------  T(C): 37.4 (08-14-21 @ 16:00), Max: 37.5 (08-14-21 @ 04:00)  HR: 67 (08-14-21 @ 18:00) (57 - 77)  BP: 130/64 (08-14-21 @ 18:00) (104/56 - 151/73)  RR: 13 (08-14-21 @ 18:00) (12 - 37)  SpO2: 97% (08-14-21 @ 18:00) (94% - 100%)  Wt(kg): --  Height (cm): 180.3 (08-13-21 @ 09:10)  Weight (kg): 107 (08-13-21 @ 14:12)  BMI (kg/m2): 32.9 (08-13-21 @ 14:12)  BSA (m2): 2.26 (08-13-21 @ 14:12)      08-13-21 @ 07:01  -  08-14-21 @ 07:00  --------------------------------------------------------  IN: 1159.6 mL / OUT: 845 mL / NET: 314.6 mL    08-14-21 @ 07:01  -  08-14-21 @ 18:32  --------------------------------------------------------  IN: 746.6 mL / OUT: 315 mL / NET: 431.6 mL      Physical Exam:  	Gen: intubated   	no jvd   	Pulm: decrease bs  no rales or ronchi or wheezing  	CV: RRR, S1S2; no rub  	Abd: +BS, soft, nontender/nondistended  	UE: Warm, no cyanosis  no clubbing,  no edema  	LE: Warm, no cyanosis  no clubbing, no edema  	Neuro: intubated with intermittent movements   	Skin: Warm, no decrease skin turgor       LABS/STUDIES  --------------------------------------------------------------------------------              12.5   12.86 >-----------<  289      [08-14-21 @ 00:53]              37.4     139  |  104  |  21  ----------------------------<  142      [08-14-21 @ 00:53]  4.3   |  21  |  2.02        Ca     9.3     [08-14-21 @ 00:53]      Mg     2.2     [08-14-21 @ 00:53]      Phos  2.4     [08-14-21 @ 00:53]    TPro  6.8  /  Alb  3.8  /  TBili  0.7  /  DBili  x   /  AST  23  /  ALT  18  /  AlkPhos  100  [08-14-21 @ 00:53]    PT/INR: PT 13.1 , INR 1.10       [08-14-21 @ 00:53]  PTT: 28.5       [08-14-21 @ 00:53]    Troponin .055      [08-13-21 @ 09:05]        [08-13-21 @ 18:01]    Creatinine Trend:  SCr 2.02 [08-14 @ 00:53]  SCr 1.68 [08-13 @ 18:01]  SCr 1.67 [08-13 @ 14:08]  SCr 1.71 [08-13 @ 11:00]  SCr 1.97 [08-13 @ 09:05]    Urinalysis - [08-14-21 @ 04:36]      Color Light Yellow / Appearance Clear / SG 1.043 / pH 7.0      Gluc Trace / Ketone Negative  / Bili Negative / Urobili Negative       Blood Negative / Protein 30 mg/dL / Leuk Est Negative / Nitrite Negative      RBC 1 / WBC 0 / Hyaline 0 / Gran  / Sq Epi  / Non Sq Epi 0 / Bacteria 0.0      TSH 1.33      [08-13-21 @ 14:52]  Lipid: chol 169, TG 56, HDL 89, LDL --      [08-13-21 @ 13:21]      < from: CT Angio Abdomen and Pelvis w/ IV Cont (08.14.21 @ 11:44) >    EXAM:  CT ANGIO ABD PELV (W)AW IC                          EXAM:  CT ANGIO CHEST AORTA [facility]                            PROCEDURE DATE:  08/14/2021            INTERPRETATION:  CLINICAL INFORMATION: Aortic dissection    COMPARISON: 8/13/2020    CONTRAST/COMPLICATIONS:  IV Contrast: 90 ml of Omnipaque 350 was administered  Oral Contrast: None  Complications: None    PROCEDURE:  CT Angiography of the Chest, Abdomen and Pelvis.  Gated precontrast imaging was performed through the heart followed by gated CT Angiography of the heart with subsequent non-gated arterial phase imaging of the chest, abdomen and pelvis.  Sagittal and coronal reformats were performed as well as 3D (MIP) reconstructions.    FINDINGS:  CHEST:  LUNGS AND LARGE AIRWAYS: Patent central airways. Trace bibasilar pleural effusions and atelectasis.  PLEURA: Trace pleural effusions. No pneumothorax.  VESSELS: Type A aortic dissection from the distal ascending aorta to distal arch is unchanged. Left aortic arch with normal branching pattern.    Thoracic aorta luminal measurements:    *  Aortic root: 4.2 x 4.2 cm, unchanged  *  Mid ascending aorta at the level of proximal dissection: 5.3 x 4.6 cm, unchanged  *  Transverse arch between the left common carotid and left subclavian artery: 4.4 x 4 cm, unchanged  *  Mid descending at the level of the left pulmonary artery: 2.6 x 2.6 cm, unchanged    HEART: Cardiomegaly. No pericardial effusion.  MEDIASTINUM AND RISHI: No lymphadenopathy.  CHEST WALL AND LOWER NECK: Within normal limits.    ABDOMEN AND PELVIS:  LIVER: Within normal limits.  BILE DUCTS: Normal caliber.  GALLBLADDER: Within normal limits.  SPLEEN: Within normal limits.  PANCREAS: Within normal limits.  ADRENALS: Within normal limits.  KIDNEYS/URETERS: Withinnormal limits.    BLADDER: Butcher catheter.  REPRODUCTIVE ORGANS: Prostate within normal limits.    BOWEL: No bowel obstruction. The appendix is normal.  PERITONEUM: No ascites.  VESSELS: Within normal limits.  RETROPERITONEUM/LYMPH NODES: No lymphadenopathy.  ABDOMINAL WALL: Within normal limits.  BONES: Within normal limits.    IMPRESSION:  Type A aortic dissection is unchanged. The abdominal aorta is normal.    --- End of Report ---    < end of copied text >  
HPI: 57yo RH man w/ h/o chronic pain takes advil and aleve presenting as transfer from [facility] for left BG hemorrhage with Neurology consult for such. Onur, patient's brother () at bedside and provides history. 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 At [place], CTH noncon was done and demonstrates left basal ganglia hemorrhage. Patient subsequently transferred to [facility] ED and repeat imaging demonstrated IVH with the IPH. Onur believes patient does not take blood thinners, takes advil and aleve for some chronic pain issues, but does not take prescription medications. He does not believe patient has a history of HTN. At [place], patient found to have SBPs 200s, currently on cardene gtt. Ct angio chest concerning for dissection.     (Stroke only)  NIHSS: 22  MRS: 0  ICH: 2  ABC/2: 18.3    REVIEW OF SYSTEMS  unable to obtain at this time    MEDICATIONS (HOME):  Home Medications:    MEDICATIONS  (STANDING):  niCARdipine Infusion 5 mG/Hr (25 mL/Hr) IV Continuous <Continuous>    MEDICATIONS  (PRN):    ALLERGIES/INTOLERANCES:  Allergies  No Known Allergies    VITALS & EXAMINATION:  Vital Signs Last 24 Hrs  T(C): 36.3 (13 Aug 2021 10:40), Max: 37.1 (13 Aug 2021 10:28)  T(F): 97.4 (13 Aug 2021 10:40), Max: 98.7 (13 Aug 2021 10:28)  HR: 71 (13 Aug 2021 11:44) (58 - 71)  BP: 134/78 (13 Aug 2021 11:44) (126/229 - 220/112)  BP(mean): 102 (13 Aug 2021 11:10) (101 - 138)  RR: 17 (13 Aug 2021 11:44) (11 - 22)  SpO2: 99% (13 Aug 2021 11:44) (94% - 100%)    Neurological (>12):  MS: eyes open, easily closes eyes and does not stay alert, knows name, unable to answer rest of mental status questions appropriately, speech moderately dysarthric, dysfluent, with impaired naming, repetition, comprehension, only opens and closes eyes does not follow other commands    CNs: PERRL (R = 4mm, L = 4mm). VF reduced in RVF to threat. left gaze deviation that does not cross midline,  moderate right facial droop    Motor: left arm and leg at least antigravity; no movement of right arm or right leg	     Cortical: Extinction on DSS (neglect): reduced to right on dss    LABORATORY:  CBC                       14.6   14.40 )-----------( 284      ( 13 Aug 2021 10:59 )             42.8     Chem 08-13    142  |  104  |  18  ----------------------------<  118<H>  3.8   |  24  |  1.71<H>    Ca    9.0      13 Aug 2021 11:00  Mg     2.1     08-13    TPro  7.4  /  Alb  4.3  /  TBili  0.5  /  DBili  x   /  AST  26  /  ALT  19  /  AlkPhos  117  08-13    LFTs LIVER FUNCTIONS - ( 13 Aug 2021 11:00 )  Alb: 4.3 g/dL / Pro: 7.4 g/dL / ALK PHOS: 117 U/L / ALT: 19 U/L / AST: 26 U/L / GGT: x           Coagulopathy PT/INR - ( 13 Aug 2021 09:05 )   PT: 10.9 sec;   INR: 0.90 ratio         PTT - ( 13 Aug 2021 09:05 )  PTT:35.6 sec  Lipid Panel   A1c   Cardiac enzymes CARDIAC MARKERS ( 13 Aug 2021 09:05 )  .055 ng/mL / x     / x     / x     / x        STUDIES & IMAGING:  Studies (EKG, EEG, EMG, etc):     Radiology (XR, CT, MR, U/S, TTE/SHELBY):    pending read of CT/CTA H&N and CTA chest
p (1480)     HPI:  56M no AC/AP xfer from [facility] for L BG heme. On presentation was 230 systolic. CTH shows L 3n1p8sh BG heme. ICH score 0.  Exam: AOx1, 2 with choices, expressive aphasia, L side 5/5, RUE 0/5, RLE WD, R facial, L gaze cannot overcome.     --Anticoagulation:    =====================  PAST MEDICAL HISTORY     PAST SURGICAL HISTORY         MEDICATIONS:  Antibiotics:    Neuro:    Other:  niCARdipine Infusion 5 mG/Hr IV Continuous <Continuous>      SOCIAL HISTORY:   Occupation:   Marital Status:     FAMILY HISTORY:      ROS: Negative except per HPI    LABS:                          14.6   14.40 )-----------( 284      ( 13 Aug 2021 10:59 )             42.8     08-13    142  |  104  |  18  ----------------------------<  118<H>  3.8   |  24  |  1.71<H>    Ca    9.0      13 Aug 2021 11:00  Mg     2.1     08-13    TPro  7.4  /  Alb  4.3  /  TBili  0.5  /  DBili  x   /  AST  26  /  ALT  19  /  AlkPhos  117  08-13

## 2021-08-14 NOTE — PROGRESS NOTE ADULT - SUBJECTIVE AND OBJECTIVE BOX
LUCY MACIAS   MRN#: [id number]     The patient is a 56y Male who was seen, evaluated, & examined with the ICU staff with a multidisciplinary care plan formulated & implemented. All available clinical, laboratory, radiographic, pharmacologic, and electrocardiographic data were reviewed & analyzed.      The patient was in the ICU in critical condition secondary to:     persistent cardiopulmonary dysfunction  ascending aortic dissection  intra-parenchymal cerebral bleed    For support and evaluation & prevention of further decompensation secondary to persistent cardiopulmonary dysfunction, respiratory status required:     supplemental oxygen with full ventilatory support/mechanical ventilation  continuous pulse oximetry monitoring  following ABGs with A-line monitoring    Invasive hemodynamic monitoring with an A-line was required for continuous MAP/BP monitoring to ensure adequate cardiovascular support and to evaluate for & help prevent decompensation while receiving     IV Cardene infusion  IV Labetalol infusion    secondary to     ascending aortic dissection  intra-parenchymal cerebral bleed    In addition:  Defer aortic procedure due to head bleed and hemodynamic stability  Continue IV anti-hypertensive infusions to target MAP 65  Intubated for airway protection  NeuroSurgery following, multiple head CTs unchanged - no further management per their recs  Worsening MILDRED s/p multiple dye loads - will trend, target overall net positive

## 2021-08-14 NOTE — PROVIDER CONTACT NOTE (CHANGE IN STATUS NOTIFICATION) - RECOMMENDATIONS
neurology consult and repeat CT of head if mental status continues to decline or changes in neuro status.

## 2021-08-14 NOTE — PROVIDER CONTACT NOTE (CHANGE IN STATUS NOTIFICATION) - ASSESSMENT
pt arouses to pain and repeated stimuli. pt moves b/l lower extremities and left upper extremity but not on command.

## 2021-08-14 NOTE — CONSULT NOTE ADULT - ASSESSMENT
55yo man w/ no known PMH, no medications, presents to ED as transfer from [facility] with left basal ganglia intraparenchymal hemorrhage and incidental finding of Type A dissection.  Patient's brother at bedside and provides history. 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 At [place], CTH noncon was done and demonstrates left basal ganglia hemorrhage. Patient subsequently transferred to [facility] ED, repeat imaging showed possible Type A aortic dissection. Cardiothoracic surgery consulted, CTA CAP showed Type A dissection from distal ascending aorta to distal arch. Pt in [facility] ED started on  nicardipine gtt.  pt also noticed with cr 2. per brother pt has not seen a primary doc.  pt required intubation for airway protection       1- MILDRED   2- type A aortic dissection   3- ICH   4- HTN     mildred in setting of type A aortic dissection with iv contrast with underlying likely ckd   cardene drip for bp control  strict I/O  low uo is of concern cont to monitor   vent support   d/w pt brother at bedside  d/w CTU team

## 2021-08-14 NOTE — PROVIDER CONTACT NOTE (CHANGE IN STATUS NOTIFICATION) - SITUATION
Pt has altered mental status and not following commands. unable to assess neuro status using NIH scale to pt inability to  speak or follow commands.

## 2021-08-14 NOTE — OCCUPATIONAL THERAPY INITIAL EVALUATION ADULT - PERTINENT HX OF CURRENT PROBLEM, REHAB EVAL
55yo man w/ no known PMH, no medications, presents to ED as transfer from [facility] with left basal ganglia intraparenchymal hemorrhage and incidental finding of Type A dissection.  Patient's brother at bedside and provides history.

## 2021-08-14 NOTE — PROGRESS NOTE ADULT - SUBJECTIVE AND OBJECTIVE BOX
Patient seen and examined at bedside.    --Anticoagulation--    T(C): 37.5 (08-14-21 @ 04:00), Max: 37.5 (08-14-21 @ 04:00)  HR: 67 (08-14-21 @ 05:15) (57 - 81)  BP: 123/59 (08-14-21 @ 05:15) (92/55 - 220/112)  RR: 19 (08-14-21 @ 05:15) (11 - 37)  SpO2: 95% (08-14-21 @ 05:15) (94% - 100%)  Wt(kg): --                      12.5   12.86 )-----------( 289      ( 14 Aug 2021 00:53 )             37.4     08-14    139  |  104  |  21  ----------------------------<  142<H>  4.3   |  21<L>  |  2.02<H>    Ca    9.3      14 Aug 2021 00:53  Phos  2.4     08-14  Mg     2.2     08-14    TPro  6.8  /  Alb  3.8  /  TBili  0.7  /  DBili  x   /  AST  23  /  ALT  18  /  AlkPhos  100  08-14      Exam: AOx1, 2 with choices, expressive aphasia, L side 5/5, RUE 0/5, RLE WD, R facial, L gaze cannot overcome   Patient seen and examined at bedside.    --Anticoagulation--    T(C): 37.5 (08-14-21 @ 04:00), Max: 37.5 (08-14-21 @ 04:00)  HR: 67 (08-14-21 @ 05:15) (57 - 81)  BP: 123/59 (08-14-21 @ 05:15) (92/55 - 220/112)  RR: 19 (08-14-21 @ 05:15) (11 - 37)  SpO2: 95% (08-14-21 @ 05:15) (94% - 100%)  Wt(kg): --                      12.5   12.86 )-----------( 289      ( 14 Aug 2021 00:53 )             37.4     08-14    139  |  104  |  21  ----------------------------<  142<H>  4.3   |  21<L>  |  2.02<H>    Ca    9.3      14 Aug 2021 00:53  Phos  2.4     08-14  Mg     2.2     08-14    TPro  6.8  /  Alb  3.8  /  TBili  0.7  /  DBili  x   /  AST  23  /  ALT  18  /  AlkPhos  100  08-14      Exam: OX0, EOS, not FC, L side purposeful AG movement, RUE 0/5, RLE WD

## 2021-08-14 NOTE — PROGRESS NOTE ADULT - ASSESSMENT
56M no AC/AP xfer from GC for L BG heme. On presentation was 230 systolic. CTH shows L [id number] BG heme. ICH score 0.  Exam: AOx1, 2 with choices, expressive aphasia, L side 5/5, RUE 0/5, RLE WD, R facial, L gaze cannot overcome.   -Adm to CT ICU   -Repeat CTH this AM shows grossly stable L BG IPH, pending final read  -CTA chest showed large type A aortic dissection  -, TEG WNL, fibrinogen 575, no DDAVP/Plts given dissection  -Stroke consulted  -Urine tox screen positive for amphetamine and methadone  -AEDs per neurology  -BP <120 per CT ICU    Update:  Repeat CTH 8/13 showed stable hemorrhage size, no spot sign on CTA, but interval dissection into L Lateral ventricle. Recommend repeat CTH in the AM. The risk of worsening hemorrhage for this patient with high dose heparinization for any emergent aortic repair is approximately 90%, if not higher. This risk will drop off the further out we are from the initial hemorrhage. International expert consensus is that at 2 weeks the risk acceptable (likely <5%), and at 4 weeks there is minimal to no risk. The curve drops off logarithmically the further out from the hemorrhage. Case was discussed with Dr. Navas, there must be careful consideration of the patients risk of dissection propagation and death, and if that risk outweighs the risk of devastating intracranial hemorrhage than CT surgery should proceed with surgery despite the risks of high dose heparinization. However, if the risk of hemorrhage outweighs those risks than we would recommend delaying surgery as long as possible to reduce the risk as much as possible. There are no plans for neurosurgical intervention at this time. Recommend a Na goal of 135-145.  If patient has worsening exam repeat CTH to r/o hydrocephalus although given minimal IVH this is unlikely.  56M no AC/AP xfer from GC for L BG heme. On presentation was 230 systolic. CTH shows L [id number] BG heme. ICH score 0.  Exam: AOx1, 2 with choices, expressive aphasia, L side 5/5, RUE 0/5, RLE WD, R facial, L gaze cannot overcome.   -Adm to CT ICU   -Repeat CTH this AM shows grossly stable L BG IPH, pending final read  -Getting intub. this AM for worsening exam (OX0, not FC)  -CTH after intub.   -CTA chest showed large type A aortic dissection  -, TEG WNL, fibrinogen 575, no DDAVP/Plts given dissection  -Stroke consulted  -AEDs per neurology  -Urine tox screen positive for amphetamine and methadone  -BP <120 per CT ICU    Update:  Repeat CTH 8/13 showed stable hemorrhage size, no spot sign on CTA, but interval dissection into L Lateral ventricle. Recommend repeat CTH in the AM. The risk of worsening hemorrhage for this patient with high dose heparinization for any emergent aortic repair is approximately 90%, if not higher. This risk will drop off the further out we are from the initial hemorrhage. International expert consensus is that at 2 weeks the risk acceptable (likely <5%), and at 4 weeks there is minimal to no risk. The curve drops off logarithmically the further out from the hemorrhage. Case was discussed with Dr. Navas, there must be careful consideration of the patients risk of dissection propagation and death, and if that risk outweighs the risk of devastating intracranial hemorrhage than CT surgery should proceed with surgery despite the risks of high dose heparinization. However, if the risk of hemorrhage outweighs those risks than we would recommend delaying surgery as long as possible to reduce the risk as much as possible. There are no plans for neurosurgical intervention at this time. Recommend a Na goal of 135-145.  If patient has worsening exam repeat CTH to r/o hydrocephalus although given minimal IVH this is unlikely.

## 2021-08-15 NOTE — PROGRESS NOTE ADULT - ASSESSMENT
Mr. Jason is a 57yo RH man w/ h/o chronic pain who presented as a transfer from [facility] for left BG hemorrhage -- initially aphasic with R hemiparesis. Found to have Type A aortic dissection and subsequently admitted to CTICU. Hospital course complicated by worsening mentation to obtunded mental status requiring intubation for airway protection. Repeat CTH/CTA chest stable. Subsequent labs notable for MILDRED.   Now off sedation for >12 hours without improvement in mentation.     8/15 -- Exam notable for comatose mental state with reflexive movements to noxious stimuli, +roving eye movements.     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   Possibly due to anoxic hit vs embolic event vs subclinical seizures.    Recommendations:  [] Ideally would benefit from MRI brain w/ & w/o contrast for evaluation of hemorrhage however, given interval development of MILDRED, can obtain MRI brain non-contrast  [] Would obtain NSICU consultation   [] rEEG to evaluate for subclinical seizures   [] SBP goal <140/90 from a Neurovascular standpoint   [x] Neurosurgery eval noted; No acute neurosurgical intervention at this time  [x] Cardiac team deferring repair of aortic dissection at this time  [] Would continue to hold all Antiplatelets  [] Frequent Neurochecks for next 48-72 hours    The stroke team spoke to patient's sister (via telephone) and brother (at bedside) and conveyed the utility in observing patient's Neurologic status for the next 2-3 days along with MRI imaging/CT imaging in determining patient's ultimate prognosis. However, given the lack of mentation despite being off sedation, patient currently has a poor prognosis as his clinical exam appears worse than his imaging findings.   Patient's family understood the discussion and acknowledged and agreed with the plan.     Please call [phone] with questions.     Plan discussed with Stroke Attending, Dr. Zachary Chambers and conveyed to CTICU team.    Attending Attestation to follow.  Mr. Jason is a 57yo RH man w/ h/o chronic pain who presented as a transfer from [facility] for left BG hemorrhage -- initially aphasic with R hemiparesis. Found to have Type A aortic dissection and subsequently admitted to CTICU. Hospital course complicated by worsening mentation to obtunded mental status requiring intubation for airway protection. Repeat CTH/CTA chest stable. Subsequent labs notable for MILDRED.   Now off sedation for >12 hours without improvement in mentation.     8/15 -- Exam notable for comatose mental state with reflexive movements to noxious stimuli, +roving eye movements.     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   Possibly due to anoxic hit vs embolic event vs subclinical seizures vs toxic/metabolic derangements.     Recommendations:  [] Ideally would benefit from MRI brain w/ & w/o contrast for evaluation of hemorrhage however, given interval development of MILDRED, can obtain MRI brain non-contrast  [] Would obtain NSICU consultation   [] rEEG to evaluate for subclinical seizures   [] SBP goal <140/90 from a Neurovascular standpoint   [x] Neurosurgery eval noted; No acute neurosurgical intervention at this time  [x] Cardiac team deferring repair of aortic dissection at this time  [] Would continue to hold all Antiplatelets  [] Frequent Neurochecks for next 48-72 hours    The stroke team spoke to patient's sister (via telephone) and brother (at bedside) and conveyed the utility in observing patient's Neurologic status for the next 2-3 days along with MRI imaging/CT imaging in determining patient's ultimate prognosis. However, given the lack of mentation despite being off sedation, patient currently has a poor prognosis as his clinical exam appears worse than his imaging findings.   Patient's family understood the discussion and acknowledged and agreed with the plan.     Please call [phone] with questions.     Plan discussed with Stroke Attending, Dr. Zachary Chambers and conveyed to CTICU team.    Attending Attestation to follow.  Mr. Jason is a 55yo RH man w/ h/o chronic pain who presented as a transfer from [facility] for left BG hemorrhage -- initially aphasic with R hemiparesis. Found to have Type A aortic dissection and subsequently admitted to CTICU. Hospital course complicated by worsening mentation to obtunded mental status requiring intubation for airway protection. Repeat CTH/CTA chest stable. Subsequent labs notable for MILDRED.   Now off sedation for >12 hours without improvement in mentation.     8/15 -- Exam notable for comatose mental state with reflexive movements to noxious stimuli, +roving eye movements.     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   Possibly due to anoxic hit vs embolic event vs subclinical seizures vs toxic/metabolic derangements.     Recommendations:  [] Ideally would benefit from MRI brain w/ & w/o contrast for evaluation of hemorrhage however, given interval development of MILDRED, can obtain MRI brain non-contrast  [] Would obtain NSICU consultation   [] rEEG to evaluate for subclinical seizures   [] SBP goal <140/90 from a Neurovascular standpoint   [x] Neurosurgery eval noted; No acute neurosurgical intervention at this time  [x] Cardiac team deferring repair of aortic dissection at this time  [] Would continue to hold all Antiplatelets  [] Frequent Neurochecks for next 48-72 hours    The stroke team spoke to patient's sister (via telephone) and brother (at bedside) and conveyed the utility in observing patient's Neurologic status for the next 2-3 days along with MRI imaging/CT imaging in determining patient's ultimate prognosis. However, given the lack of mentation despite being off sedation, patient currently has a poor prognosis as his clinical exam appears worse than his imaging findings.   Patient's family understood the discussion and acknowledged and agreed with the plan.     Please call [phone] with questions.     Plan discussed in detail with primary team and family  by  Stroke Attending, Dr. Chambers

## 2021-08-15 NOTE — PROGRESS NOTE ADULT - SUBJECTIVE AND OBJECTIVE BOX
Patient seen and examined at bedside.    --Anticoagulation--    ICU Vital Signs Last 24 Hrs  T(C): 38.7 (15 Aug 2021 08:30), Max: 38.7 (15 Aug 2021 08:30)  T(F): 101.6 (15 Aug 2021 08:30), Max: 101.6 (15 Aug 2021 08:30)  HR: 66 (15 Aug 2021 11:30) (58 - 90)  BP: 119/56 (15 Aug 2021 11:30) (106/56 - 156/72)  BP(mean): 80 (15 Aug 2021 11:30) (76 - 107)  ABP: 119/52 (15 Aug 2021 11:30) (114/48 - 170/69)  ABP(mean): 70 (15 Aug 2021 11:30) (65 - 96)  RR: 13 (15 Aug 2021 11:30) (12 - 22)  SpO2: 95% (15 Aug 2021 11:30) (94% - 99%)                          12.8   14.65 )-----------( 315      ( 15 Aug 2021 00:26 )             37.4   08-15    143  |  107  |  24<H>  ----------------------------<  144<H>  4.0   |  21<L>  |  2.24<H>    Ca    9.4      15 Aug 2021 00:26  Phos  3.3     08-15  Mg     2.5     08-15    TPro  6.9  /  Alb  4.1  /  TBili  0.5  /  DBili  x   /  AST  20  /  ALT  16  /  AlkPhos  93  08-15       Exam: Intubated, not EO, not FC, RUE 0/5, LUE, LLE, RLE WD

## 2021-08-15 NOTE — PROGRESS NOTE ADULT - ASSESSMENT
57yo man w/ no known PMH, no medications, presents to ED as transfer from [facility] with left basal ganglia intraparenchymal hemorrhage and incidental finding of Type A dissection.  Patient's brother at bedside and provides history. 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 At [place], CTH noncon was done and demonstrates left basal ganglia hemorrhage. Patient subsequently transferred to [facility] ED, repeat imaging showed possible Type A aortic dissection. Cardiothoracic surgery consulted, CTA CAP showed Type A dissection from distal ascending aorta to distal arch. Pt in [facility] ED started on  nicardipine gtt.  pt also noticed with cr 2. per brother pt has not seen a primary doc.  pt required intubation for airway protection       1- MILDRED   2- type A aortic dissection   3- ICH   4- HTN     mildred in setting of type A aortic dissection with iv contrast with underlying likely ckd. cr is rising uo remains low   cardene drip for bp control  strict I/O  low uo is of concern cont to monitor   vent support   d/w CTU team

## 2021-08-15 NOTE — PROGRESS NOTE ADULT - ATTENDING COMMENTS
I agree with above attestation and plan as mentioned in fellow note.  Patient's neurological exam is out of proportion to left intracerebral hemorrhage.  Recommend repeat neurological exam and neuroimaging preferably MRI to help with prognosis.Discussed in detail with family members, patient's sister is anesthesiologist in [place] and verbalized understanding about possibly poor prognosis given current clinical neurological exam.

## 2021-08-15 NOTE — PROGRESS NOTE ADULT - SUBJECTIVE AND OBJECTIVE BOX
LUCY MACIAS   MRN#: [id number]     The patient is a 56y Male who was seen, evaluated, & examined with the ICU staff with a multidisciplinary care plan formulated & implemented. All available clinical, laboratory, radiographic, pharmacologic, and electrocardiographic data were reviewed & analyzed.      The patient was in the ICU in critical condition secondary to:     persistent cardiopulmonary dysfunction  ascending aortic dissection  intra-parenchymal cerebral bleed    For support and evaluation & prevention of further decompensation secondary to persistent cardiopulmonary dysfunction, respiratory status required:     supplemental oxygen with full ventilatory support/mechanical ventilation  continuous pulse oximetry monitoring  following ABGs with A-line monitoring    Invasive hemodynamic monitoring with an A-line was required for continuous MAP/BP monitoring to ensure adequate cardiovascular support and to evaluate for & help prevent decompensation while receiving     IV Cardene infusion  IV Labetalol infusion    secondary to     ascending aortic dissection  intra-parenchymal cerebral bleed    In addition:  Defer aortic surgery due to head bleed and hemodynamic stability  Continue IV anti-hypertensive infusions to target systolics 130-140 to maintain cerebral perfusion  Intubated for airway protection  Neurology and Neurosurgery following, multiple head CTs unchanged though he is posturing on exam - may need EVD  Worsening MILDRED s/p multiple dye loads - will trend, target overall net positive  Multiple family members have been updated at the bedside

## 2021-08-15 NOTE — PROGRESS NOTE ADULT - SUBJECTIVE AND OBJECTIVE BOX
Vascular Neurology Progress Note    S: Patient seen and examined at bedside. Overnight, patient was noted to be taken off sedation (propofol) around 10pm on 8/14 however, did not improve mental status. ROS limited.       REVIEW OF SYSTEMS  unable to obtain at this time    MEDICATIONS (HOME):  Home Medications:    MEDICATIONS  (STANDING):  chlorhexidine 0.12% Liquid 15 milliLiter(s) Oral Mucosa every 12 hours  labetalol Infusion 2 mG/Min (120 mL/Hr) IV Continuous <Continuous>  niCARdipine Infusion 5 mG/Hr (25 mL/Hr) IV Continuous <Continuous>  pantoprazole  Injectable 40 milliGRAM(s) IV Push daily    MEDICATIONS  (PRN):  acetaminophen    Suspension .. 650 milliGRAM(s) Enteral Tube every 6 hours PRN Temp greater or equal to 38C (100.4F)      ALLERGIES/INTOLERANCES:  Allergies  No Known Allergies    VITALS & EXAMINATION:  Vital Signs Last 24 Hrs  T(C): 38.7 (15 Aug 2021 08:30), Max: 38.7 (15 Aug 2021 08:30)  T(F): 101.6 (15 Aug 2021 08:30), Max: 101.6 (15 Aug 2021 08:30)  HR: 66 (15 Aug 2021 11:30) (59 - 90)  BP: 119/56 (15 Aug 2021 11:30) (106/56 - 156/72)  BP(mean): 80 (15 Aug 2021 11:30) (76 - 107)  RR: 13 (15 Aug 2021 11:30) (12 - 22)  SpO2: 95% (15 Aug 2021 11:30) (94% - 99%)    General: Intubated (off sedation). NAD.     Neurological (>12):  MS: Eyes closed. Does not arouse to voice or noxious stimuli. No verbal output. Not following commands. +Roving eye movements noted. Grimaces to noxious stimuli.    CNs: Moderate R facial droop. Initially with L gaze deviation however, noted to have roving eye movements that crossed midline.   Motor: Non-purposeful spontaneous movement noted in both UE/LE (R>L).       LABORATORY:                        12.8   14.65 )-----------( 315      ( 15 Aug 2021 00:26 )             37.4     08-15    143  |  107  |  24<H>  ----------------------------<  144<H>  4.0   |  21<L>  |  2.24<H>    Ca    9.4      15 Aug 2021 00:26  Phos  3.3     08-15  Mg     2.5     08-15    TPro  6.9  /  Alb  4.1  /  TBili  0.5  /  DBili  x   /  AST  20  /  ALT  16  /  AlkPhos  93  08-15    LIVER FUNCTIONS - ( 15 Aug 2021 00:26 )  Alb: 4.1 g/dL / Pro: 6.9 g/dL / ALK PHOS: 93 U/L / ALT: 16 U/L / AST: 20 U/L / GGT: x             STUDIES & IMAGING:  Studies (EKG, EEG, EMG, etc):     Radiology (XR, CT, MR, U/S, TTE/SHELBY):    < from: CT Angio Head w/ IV Cont (08.14.21 @ 11:22) >    IMPRESSION:    CT BRAIN: Redemonstration of left basal ganglia hemorrhage measuring 2.3 x 5.7 x 3.3 cm, not significantly changed.    CTA NECK AND BRAIN: No vessel occlusion, hemodynamically significant stenosis, dissection, or aneurysm. The smaller left vertebral artery is not well visualized in its entirety. Whether this is due simply to motion is unclear.    < end of copied text >    < from: CT Head No Cont (08.15.21 @ 13:10) >  IMPRESSION:    Unchanged left basal ganglia hemorrhage with similar mass effect on left lateral ventricle.  Redemonstration of layering hemorrhage within the left lateral ventricle.  No new intracranial hemorrhage.    < end of copied text >  < from: CT Angio Abdomen and Pelvis w/ IV Cont (08.14.21 @ 11:44) >  IMPRESSION:  Type A aortic dissection is unchanged. The abdominal aorta is normal.    < end of copied text >

## 2021-08-15 NOTE — CHART NOTE - NSCHARTNOTEFT_GEN_A_CORE
Given multiple stable CTH scans showing no hydrocephalus or increased left basal ganglia IPH, no neurosurgical intervention at this time. Please contact neurosurgery at [phone] with questions or concerns.

## 2021-08-15 NOTE — PROGRESS NOTE ADULT - ASSESSMENT
56M no AC/AP xfer from [facility] for L BG heme. On presentation was 230 systolic. CTH shows L [id number] BG heme. ICH score 0.  Exam: AOx1, 2 with choices, expressive aphasia, L side 5/5, RUE 0/5, RLE WD, R facial, L gaze cannot overcome.   -Adm to CT ICU   -Pt exam worsened today, got STAT CTH that appeared grossly stable, pending FR  -CTA chest showed large type A aortic dissection  -Stroke consulted  -AEDs per neurology  -Urine tox screen positive for amphetamine and methadone  -BP <120 per CT ICU    The risk of worsening hemorrhage for this patient with high dose heparinization for any emergent aortic repair is approximately 90%, if not higher. This risk will drop off the further out we are from the initial hemorrhage. International expert consensus is that at 2 weeks the risk acceptable (likely <5%), and at 4 weeks there is minimal to no risk. The curve drops off logarithmically the further out from the hemorrhage. Case was discussed with Dr. Navas, there must be careful consideration of the patients risk of dissection propagation and death, and if that risk outweighs the risk of devastating intracranial hemorrhage than CT surgery should proceed with surgery despite the risks of high dose heparinization. However, if the risk of hemorrhage outweighs those risks than we would recommend delaying surgery as long as possible to reduce the risk as much as possible. There are no plans for neurosurgical intervention at this time. Recommend a Na goal of 135-145.  If patient has worsening exam repeat CTH to r/o hydrocephalus although given minimal IVH this is unlikely.

## 2021-08-15 NOTE — PROGRESS NOTE ADULT - SUBJECTIVE AND OBJECTIVE BOX
[facility]   [phone]  RENAL FOLLOW UP NOTE  --------------------------------------------------------------------------------  Chief Complaint:    24 hour events/subjective:    seen earlier. intubated   posturing at times     PAST HISTORY  --------------------------------------------------------------------------------  No significant changes to PMH, PSH, FHx, SHx, unless otherwise noted    ALLERGIES & MEDICATIONS  --------------------------------------------------------------------------------  Allergies    No Known Allergies    Intolerances      Standing Inpatient Medications  chlorhexidine 0.12% Liquid 15 milliLiter(s) Oral Mucosa every 12 hours  labetalol Infusion 2 mG/Min IV Continuous <Continuous>  niCARdipine Infusion 5 mG/Hr IV Continuous <Continuous>  pantoprazole  Injectable 40 milliGRAM(s) IV Push daily    PRN Inpatient Medications  acetaminophen    Suspension .. 650 milliGRAM(s) Enteral Tube every 6 hours PRN      REVIEW OF SYSTEMS  --------------------------------------------------------------------------------        VITALS/PHYSICAL EXAM  --------------------------------------------------------------------------------  T(C): 38.3 (08-15-21 @ 16:00), Max: 38.7 (08-15-21 @ 08:30)  HR: 64 (08-15-21 @ 17:00) (61 - 90)  BP: 120/58 (08-15-21 @ 17:00) (106/56 - 156/72)  RR: 14 (08-15-21 @ 17:00) (12 - 24)  SpO2: 99% (08-15-21 @ 17:00) (94% - 100%)  Wt(kg): --        08-14-21 @ 07:01  -  08-15-21 @ 07:00  --------------------------------------------------------  IN: 1904.1 mL / OUT: 790 mL / NET: 1114.1 mL    08-15-21 @ 07:01  -  08-15-21 @ 17:38  --------------------------------------------------------  IN: 737.5 mL / OUT: 200 mL / NET: 537.5 mL      Physical Exam:  	    Gen: intubated   	no jvd   	Pulm: decrease bs  no rales or ronchi or wheezing  	CV: RRR, S1S2; no rub  	Abd: +BS, soft, nontender/nondistended  	UE: Warm, no cyanosis  no clubbing,  no edema  	LE: Warm, no cyanosis  no clubbing, no edema  	Neuro: intubated with intermittent movements   	Skin: Warm, no decrease skin turgor       LABS/STUDIES  --------------------------------------------------------------------------------              12.8   14.65 >-----------<  315      [08-15-21 @ 00:26]              37.4     143  |  107  |  24  ----------------------------<  144      [08-15-21 @ 00:26]  4.0   |  21  |  2.24        Ca     9.4     [08-15-21 @ 00:26]      Mg     2.5     [08-15-21 @ 00:26]      Phos  3.3     [08-15-21 @ 00:26]    TPro  6.9  /  Alb  4.1  /  TBili  0.5  /  DBili  x   /  AST  20  /  ALT  16  /  AlkPhos  93  [08-15-21 @ 00:26]    PT/INR: PT 13.1 , INR 1.10       [08-14-21 @ 00:53]  PTT: 28.5       [08-14-21 @ 00:53]          [08-13-21 @ 18:01]    Creatinine Trend:  SCr 2.24 [08-15 @ 00:26]  SCr 2.02 [08-14 @ 00:53]  SCr 1.68 [08-13 @ 18:01]  SCr 1.67 [08-13 @ 14:08]  SCr 1.71 [08-13 @ 11:00]              Urinalysis - [08-15-21 @ 13:52]      Color Yellow / Appearance Slightly Turbid / SG 1.050 / pH 5.5      Gluc Negative / Ketone Negative  / Bili Negative / Urobili Negative       Blood Moderate / Protein 30 mg/dL / Leuk Est Small / Nitrite Negative      RBC 9 / WBC 13 / Hyaline 106 / Gran  / Sq Epi  / Non Sq Epi 10 / Bacteria Negative      TSH 1.33      [08-13-21 @ 14:52]  Lipid: chol 169, TG 56, HDL 89, LDL --      [08-13-21 @ 13:21]

## 2021-08-16 NOTE — PROGRESS NOTE ADULT - ASSESSMENT
55yo man w/ no known PMH, no medications, presents to ED as transfer from [facility] with left basal ganglia intraparenchymal hemorrhage and incidental finding of Type A dissection.  Patient's brother at bedside and provides history. 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 At [place], CTH noncon was done and demonstrates left basal ganglia hemorrhage. Patient subsequently transferred to [facility] ED, repeat imaging showed possible Type A aortic dissection. Cardiothoracic surgery consulted, CTA CAP showed Type A dissection from distal ascending aorta to distal arch. Pt in [facility] ED started on  nicardipine gtt.  pt also noticed with cr 2. per brother pt has not seen a primary doc.  pt required intubation for airway protection       1- MILDRED   2- type A aortic dissection   3- ICH   4- HTN     mildred in setting of type A aortic dissection with iv contrast with underlying likely ckd. cr is rising uo remains low   cardene drip for bp control/labetalol drip   2% nacl drip to keep na 145-150   strict I/O  low uo is of concern cont to monitor   vent support   d/w CTU team

## 2021-08-16 NOTE — PROGRESS NOTE ADULT - SUBJECTIVE AND OBJECTIVE BOX
CRITICAL CARE ATTENDING - CTICU    MEDICATIONS  (STANDING):  chlorhexidine 0.12% Liquid 15 milliLiter(s) Oral Mucosa every 12 hours  labetalol Infusion 2 mG/Min (120 mL/Hr) IV Continuous <Continuous>  levETIRAcetam  IVPB 1000 milliGRAM(s) IV Intermittent every 12 hours  mannitol Infusion 20 Gm/Hr (100 mL/Hr) IV Continuous <Continuous>  pantoprazole  Injectable 40 milliGRAM(s) IV Push daily                                    11.4   16.10 )-----------( 242      ( 16 Aug 2021 00:44 )             34.5       08-16    140  |  107  |  38<H>  ----------------------------<  134<H>  4.0   |  19<L>  |  2.76<H>    Ca    9.3      16 Aug 2021 00:44  Phos  3.7     08-16  Mg     2.4     -    TPro  6.8  /  Alb  3.8  /  TBili  0.8  /  DBili  x   /  AST  25  /  ALT  17  /  AlkPhos  84  08          Mode: AC/ CMV (Assist Control/ Continuous Mandatory Ventilation)  RR (machine): 12  TV (machine): 650  FiO2: 50  PEEP: 5  ITime: 10  MAP: 10  PIP: 20      Daily     Daily Weight in k.1 (16 Aug 2021 00:00)      08-15 @ 07:01  -  08-16 @ 07:00  --------------------------------------------------------  IN: 1971.5 mL / OUT: 805 mL / NET: 1166.5 mL        Critically Ill patient  : [ ] preoperative ,   [] post operative [x] non operative    Requires :  [x] Arterial Line   [ ] Central Line  [ ] PA catheter  [ ] IABP  [ ] ECMO  [ ] LVAD  [x] Ventilator  [ ] pacemaker [ ] Impella.                      [x] ABG's     [x] Pulse Oxymetry Monitoring  Bedside evaluation , monitoring , treatment of hemodynamics , fluids , IVP/ IVCD meds.        Diagnosis:     Acute Intraparenchymal Hematoma w/ left basal ganglia     Severe neurological disability     Type A Aortic dissection    Hypertension -IVCD labetalol     Respiratory failure     Ventilator Management:  [x]AC-rest    [ ]CPAP-PS Wean    [ ]Trach Collar     [ ]Extubate    [ ] T-Piece  [x]peep>5 -IVCD mannitol     Renal Failure - Acute Kidney Injury - Prerenal Azotemia     Fever    Tolerates NG / NJ feeds at [x] goal rate    [ ] trophic rate      Requires chest PT, pulmonary toilet, ambu bagging, suctioning to maintain SaO2,  patent airway and treat atelectasis.     Subclinical seizures - IVPB Keppra         Gerry MENDIOLA, personally performed the services described in this documentation. All medical record entries made by the scribe were at my direction and in my presence. I have reviewed the chart and agree that the record reflects my personal performance and is accurate and complete.   Gerry Page MD.       By signing my name below, I, Harika Mejia, attest that this documentation has been prepared under the direction and in the presence of Gerry Page MD.   Electronically Signed: Harika Mejia Scribe 21 @ 07:12        Discussed with CT surgeon, Physician Assistant - Nurse Practitioner- Critical care medicine team.   Dicussed at  AM / PM rounds.   Chart, labs , films reviewed.    Cumulative Critical Care Time Given Today:  CRITICAL CARE ATTENDING - CTICU    MEDICATIONS  (STANDING):  chlorhexidine 0.12% Liquid 15 milliLiter(s) Oral Mucosa every 12 hours  labetalol Infusion 2 mG/Min (120 mL/Hr) IV Continuous <Continuous>  levETIRAcetam  IVPB 1000 milliGRAM(s) IV Intermittent every 12 hours  mannitol Infusion 20 Gm/Hr (100 mL/Hr) IV Continuous <Continuous>  pantoprazole  Injectable 40 milliGRAM(s) IV Push daily                                    11.4   16.10 )-----------( 242      ( 16 Aug 2021 00:44 )             34.5       08-16    140  |  107  |  38<H>  ----------------------------<  134<H>  4.0   |  19<L>  |  2.76<H>    Ca    9.3      16 Aug 2021 00:44  Phos  3.7     08-  Mg     2.4     -    TPro  6.8  /  Alb  3.8  /  TBili  0.8  /  DBili  x   /  AST  25  /  ALT  17  /  AlkPhos  84  08          Mode: AC/ CMV (Assist Control/ Continuous Mandatory Ventilation)  RR (machine): 12  TV (machine): 650  FiO2: 50  PEEP: 5  ITime: 10  MAP: 10  PIP: 20      Daily     Daily Weight in k.1 (16 Aug 2021 00:00)      08-15 @ 07:01  -  08-16 @ 07:00  --------------------------------------------------------  IN: 1971.5 mL / OUT: 805 mL / NET: 1166.5 mL        Critically Ill patient  : [ ] preoperative ,   [] post operative [x] non operative    Requires :  [x] Arterial Line   [ ] Central Line  [ ] PA catheter  [ ] IABP  [ ] ECMO  [ ] LVAD  [x] Ventilator  [ ] pacemaker [ ] Impella.                      [x] ABG's     [x] Pulse Oxymetry Monitoring  Bedside evaluation , monitoring , treatment of hemodynamics , fluids , IVP/ IVCD meds.        Diagnosis:     Acute Intraparenchymal Hematoma w/ left basal ganglia     Severe neurological disability - obtundation    Type A Aortic dissection    Hypertension -IVCD labetalol     Respiratory failure     Ventilator Management:  [x]AC-rest    [ ]CPAP-PS Wean    [ ]Trach Collar     [ ]Extubate    [ ] T-Piece  [x]peep>5 -IVCD mannitol     Requires chest PT, pulmonary toilet, ambu bagging, suctioning to maintain SaO2,  patent airway and treat atelectasis.     Renal Failure - Acute Kidney Injury - Prerenal Azotemia     Fever    Tolerates NG / NJ feeds at [x] goal rate    [ ] trophic rate      Subclinical seizures - IVPB Keppra     Requires bedside physical therapy, mobilization and total residential care.         I, Gerry Page, personally performed the services described in this documentation. All medical record entries made by the scribe were at my direction and in my presence. I have reviewed the chart and agree that the record reflects my personal performance and is accurate and complete.   Gerry Page MD.       By signing my name below, I, Harika Mejia, attest that this documentation has been prepared under the direction and in the presence of Gerry Page MD.   Electronically Signed: Harika Mejia Scribe 21 @ 07:12        Discussed with CT surgeon, Physician Assistant - Nurse Practitioner- Critical care medicine team.   Dicussed at  AM / PM rounds.   Chart, labs , films reviewed.    Cumulative Critical Care Time Given Today:  30 min

## 2021-08-16 NOTE — CHART NOTE - NSCHARTNOTEFT_GEN_A_CORE
[facility]    ** PRELIMINARY EEG reviewed until  10:30 AM    - GPDs at 0.5-1.5 Hz seen indicating diffuse cortical irritability superimposed on moderate to severe background slowing  - No seizures recorded.      Final report to be completed at the completion of the study tomorrow morning.    -----------------------------  Fan Spencer MD, MBA  Epilepsy Fellow  --------------------------------  EEG Reading Room: [phone]  (weekdays)  On Call Service After Hours: [phone] [facility]    ** PRELIMINARY EEG reviewed until  10:30 AM    - GPDs at 0.5-1.5 Hz seen indicating diffuse cortical irritability superimposed on moderate to severe background slowing indicative of diffuse cerebral dysfunction.  - No seizures recorded.      Final report to be completed at the completion of the study tomorrow morning.    -----------------------------  Fan Spencer MD, BRAIN  Epilepsy Fellow  --------------------------------  EEG Reading Room: [phone]  (weekdays)  On Call Service After Hours: [phone]

## 2021-08-16 NOTE — PROGRESS NOTE ADULT - SUBJECTIVE AND OBJECTIVE BOX
4 am-5am            56 yr old male with aphasia, R hemiplegia found to have HTN related large L basal ganglia / intraparenchymal bleed  with Type A-aortic dissection since admission date on 8/13/21 neuro exam has deteriorated to unresponsiveness, with uncontrolled body movements moving LUE & both legs, decerebrate posturing  S/P CT scan x 5  demonstrating stable L IPH, minimal mass effect , no hydrocephalus, minimal bleed extension into ventricle. CTA chest stable Type A-aortic dissection       A/P         Severe HTN complicated with L IPH & Aortic dissection        severe neurological disability,         Respiratory failure requiring intubation        Contrast induced  Acute Kidney Injury        Fevers          Plan start diprivan / AED / EEG       No role of Ventriculostomy as there is no hydrocephalus       HOB >45 degree        Airway control / maintain PCo2 35-40       Avoid hypoxia / hypercarbia / hypotension       Sodium Goal 140-150       will give mannitol 20 Gm x1        Treat hyperthermia         DVT boots        No ASA / AC        Trophic nutrition / Protonix           If given no seizures on EEG current neuro  examination with decerebrate posturing meaningful neuro recovery unlikely.        Please have Neurology f/u , NS has signed off the case.          I have reviewed all labs and radiological data and coordinated care with CTICU staff.           Total CC time 60 mins

## 2021-08-16 NOTE — PROGRESS NOTE ADULT - SUBJECTIVE AND OBJECTIVE BOX
[facility]   [phone]  RENAL FOLLOW UP NOTE  --------------------------------------------------------------------------------  Chief Complaint:    24 hour events/subjective:    seen earlier   intubated   on mannitol drip when seen earlier.     PAST HISTORY  --------------------------------------------------------------------------------  No significant changes to PMH, PSH, FHx, SHx, unless otherwise noted    ALLERGIES & MEDICATIONS  --------------------------------------------------------------------------------  Allergies    No Known Allergies    Intolerances      Standing Inpatient Medications  chlorhexidine 0.12% Liquid 15 milliLiter(s) Oral Mucosa every 12 hours  levETIRAcetam  IVPB 1000 milliGRAM(s) IV Intermittent every 12 hours  niCARdipine Infusion 2 mG/Hr IV Continuous <Continuous>  pantoprazole  Injectable 40 milliGRAM(s) IV Push daily  sodium chloride 2% . 1000 milliLiter(s) IV Continuous <Continuous>    PRN Inpatient Medications  acetaminophen    Suspension .. 650 milliGRAM(s) Enteral Tube every 6 hours PRN      REVIEW OF SYSTEMS  --------------------------------------------------------------------------------    Gen: intubated   	no jvd   	Pulm: decrease bs  no rales or ronchi or wheezing  	CV: RRR, S1S2; no rub  	Abd: +BS, soft, nontender/nondistended  	UE: Warm, no cyanosis  no clubbing,  no edema  	LE: Warm, no cyanosis  no clubbing, no edema  	Neuro: intubated with intermittent movements   	Skin: Warm, no decrease skin turgor     VITALS/PHYSICAL EXAM  --------------------------------------------------------------------------------  T(C): 37.5 (08-16-21 @ 12:00), Max: 38.2 (08-15-21 @ 22:00)  HR: 58 (08-16-21 @ 18:45) (51 - 75)  BP: 138/65 (08-16-21 @ 03:30) (123/56 - 204/90)  RR: 13 (08-16-21 @ 18:45) (0 - 28)  SpO2: 100% (08-16-21 @ 18:45) (79% - 100%)  Wt(kg): --  Height (cm): 175.3 (08-16-21 @ 11:30)      08-15-21 @ 07:01  -  08-16-21 @ 07:00  --------------------------------------------------------  IN: 1971.5 mL / OUT: 805 mL / NET: 1166.5 mL    08-16-21 @ 07:01  -  08-16-21 @ 20:24  --------------------------------------------------------  IN: 1062.5 mL / OUT: 995 mL / NET: 67.5 mL      Physical Exam:  	    	Gen: intubated   	no jvd   	Pulm: decrease bs  no rales or ronchi or wheezing  	CV: RRR, S1S2; no rub  	Abd: +BS, soft, nontender/nondistended  	UE: Warm, no cyanosis  no clubbing,  no edema  	LE: Warm, no cyanosis  no clubbing, no edema  	Neuro: intubated with intermittent movements   	Skin: Warm, no decrease skin turgor       LABS/STUDIES  --------------------------------------------------------------------------------              11.4   16.10 >-----------<  242      [08-16-21 @ 00:44]              34.5     137  |  105  |  38  ----------------------------<  138      [08-16-21 @ 12:31]  4.3   |  20  |  2.22        Ca     9.3     [08-16-21 @ 12:31]      Mg     2.4     [08-16-21 @ 00:44]      Phos  3.7     [08-16-21 @ 00:44]    TPro  6.8  /  Alb  3.8  /  TBili  0.8  /  DBili  x   /  AST  25  /  ALT  17  /  AlkPhos  84  [08-16-21 @ 00:44]          Creatinine Trend:  SCr 2.22 [08-16 @ 12:31]  SCr 2.76 [08-16 @ 00:44]  SCr 2.24 [08-15 @ 00:26]  SCr 2.02 [08-14 @ 00:53]  SCr 1.68 [08-13 @ 18:01]              Urinalysis - [08-15-21 @ 13:52]      Color Yellow / Appearance Slightly Turbid / SG 1.050 / pH 5.5      Gluc Negative / Ketone Negative  / Bili Negative / Urobili Negative       Blood Moderate / Protein 30 mg/dL / Leuk Est Small / Nitrite Negative      RBC 9 / WBC 13 / Hyaline 106 / Gran  / Sq Epi  / Non Sq Epi 10 / Bacteria Negative      TSH 1.33      [08-13-21 @ 14:52]  Lipid: chol 169, TG 56, HDL 89, LDL --      [08-13-21 @ 13:21]

## 2021-08-16 NOTE — PROGRESS NOTE ADULT - ASSESSMENT
57yo RH man w/ h/o chronic pain (takes advil and aleve) presenting as transfer from [facility] for left BG hemorrhage. Patient with concern for new decerebrate posturing and tremors overnight. Exam today significant for roving eye movements, triple flexion response in all extremities, and new decerebrate posturing. No seizure activity captured on preliminary vEEG. CTH w/o showing new small hypodensities within watershed JEMIMA/MCA territories b/l.     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   Possibly due to anoxic hit vs embolic event vs subclinical seizures vs toxic/metabolic derangements.     Recommendations:  [] f/u MRI head w/o contrast  [] f/u vEEG results  [] SBP goal <140/90 from a Neurovascular standpoint   [] Sodium goal 140-150 for neurologic protection  [x] No acute neurosurgical intervention at this time per nerusurgery  [x] Cardiac team deferring repair of aortic dissection at this time  [] Would continue to hold all Antiplatelets  [] c/w neurochecks    Case to be discussed with neurology attending Dr. Libman.   57yo RH man w/ h/o chronic pain (takes advil and aleve) presenting as transfer from [facility] for left BG hemorrhage. Patient with concern for new decerebrate posturing and tremors overnight. Exam today significant for roving eye movements, triple flexion response in all extremities, and new decerebrate posturing. No seizure activity captured on preliminary vEEG. CTH w/o showing new small hypodensities within watershed JEMIMA/MCA territories b/l. Laboratory results significant for leukocytosis, elevated NUC/Cr, sputum cultures showing gram positive rods and cocci.    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   Possibly due to anoxic hit vs embolic event vs subclinical seizures vs toxic/metabolic derangements.     Recommendations:  [] f/u MRI head w/o contrast  [] f/u vEEG results  [] SBP goal <140/90 from a Neurovascular standpoint   [] Sodium goal 140-150 for neurologic protection  [x] No acute neurosurgical intervention at this time per nerusurgery  [x] Cardiac team deferring repair of aortic dissection at this time  [] Would continue to hold all Antiplatelets  [] c/w neurochecks    Case to be discussed with neurology attending Dr. Libman.   57yo RH man w/ h/o chronic pain (takes advil and aleve) presenting as transfer from [facility] for left BG hemorrhage. Patient with concern for new decerebrate posturing and tremors overnight. Exam today significant for roving eye movements, triple flexion response in all extremities, and new decerebrate posturing. No seizure activity captured on preliminary vEEG. CTH w/o showing new small hypodensities within watershed JEMIMA/MCA territories b/l. Laboratory results significant for leukocytosis, elevated BUN/Cr, sputum cultures from 8/15 showing gram positive rods and cocci.    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   Possibly due to evolving acute infarct versus vs subclinical seizures vs toxic/metabolic/infectious derangements.     Recommendations:  [] f/u MRI head w/o contrast  [] f/u vEEG results  [] SBP goal <140/90 from a Neurovascular standpoint   [] Sodium goal 140-150 for neurologic protection  [x] No acute neurosurgical intervention at this time per nerusurgery  [x] Cardiac team deferring repair of aortic dissection at this time  [] Would continue to hold all Antiplatelets  [] c/w neurochecks    Case to be discussed with neurology attending Dr. Libman.   Assessment: 57yo RH man with a PMHx of chronic pain (takes Advil and Aleve) who presented as a transfer from [facility] for L BG hemorrhage. Patient with concern for new decerebrate posturing and tremors overnight. Exam today significant for roving eye movements, triple flexion, and new decerebrate posturing. No seizure activity captured on preliminary vEEG. CTH shows new small hypodensities within borderzone JEMIMA/MCA territories b/l. Laboratory results significant for leukocytosis, elevated BUN/Cr, sputum cultures from 8/15 showing gram positive rods and cocci.    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   Possibly due to evolving acute infarct versus vs subclinical seizures vs toxic/metabolic/infectious derangements.     Recommendations:  [] f/u MRI head w/o contrast  [] f/u vEEG results  [] SBP goal <160 from a Neurovascular standpoint   [] Sodium goal 140-150 for neurologic protection  [x] No acute neurosurgical intervention at this time per nerusurgery  [x] Cardiac team deferring repair of aortic dissection at this time  [] Would continue to hold all Antiplatelets  [] c/w neurochecks    Case to be discussed with neurology attending Dr. Libman.   Assessment: 55yo RH man with a PMHx of chronic pain (takes Advil and Aleve) and ?EtOH abuse/substance abuse who presented as a transfer from [facility] on 8/13 for L BG hemorrhage. Patient with concern for worsening neurologic exam overnight, including new decerebrate posturing and tremors. Exam today significant for roving eye movements, triple flexion, and new decerebrate posturing. No seizure activity captured on preliminary vEEG. CTH shows new small hypodensities within borderzone JEMIMA/MCA territories b/l. Laboratory results significant for leukocytosis, elevated BUN/Cr, sputum cultures from 8/15 showing gram positive rods and cocci.    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 Prognosis is very guarded.  Possibly due to evolving acute infarct versus vs subclinical seizures vs toxic/metabolic/infectious derangements.     Recommendations:  [] Neuro checks Q2HR.  [] MRI head, preferably w/wo contrast. Can obtain w/o contrast if kidney fxn not amenable to contrast.  [] c/w vEEG.  [] SBP goal <140/90 from a Neurovascular standpoint.  [] Na goal: 135-145 for neurologic protection. Most recent Na 137. On 2% NS IVF @ 50ml/hr. Would obtain BMP Q6HR while on hypertonic saline.  [x] Neurosurgery on board, no acute surgical intervention.  [x] Cardiac team deferring repair of aortic dissection at this time.  [] Would continue to hold all antiplatelets/anticoagulation.  [] Currently on Keppra 1G IV BID. If no seizures/epileptiform activity seen on vEEG, can DC Keppra.  [] Rest of care per CTICU.    Patient discussed with stroke fellow Dr. Valenzuela. Final recommendations regarding management to be confirmed upon review by stroke attending Dr. Libman.

## 2021-08-16 NOTE — PROGRESS NOTE ADULT - NUTRITIONAL ASSESSMENT
I have reviewed imaging and labs for this patient and discussed management with resident, Dr. Mirza.  CT head with stable hemorrhage.  Hold antiplatelet and anticoagulation at this time.  Patient will need anticoagulation in approximately 10-14 days depending on stability of bleed due to presence of atrial fibrillation.  EEG unlikely subclinical seizures.  Possible metabolic/infectious derangement.    Enid Valenzuela MD  PGY5  Vascular Neurology Fellow

## 2021-08-16 NOTE — PROGRESS NOTE ADULT - SUBJECTIVE AND OBJECTIVE BOX
SUBJECTIVE/INTERVAL HISTORY: Patient seen and assessed at bedside. Per ICU team, patient with new decerebrate posturing and "full body tremors" overnight. Patient has been off sedation with propofol since 0600 .    MEDICATIONS  (STANDING):  chlorhexidine 0.12% Liquid 15 milliLiter(s) Oral Mucosa every 12 hours  levETIRAcetam  IVPB 1000 milliGRAM(s) IV Intermittent every 12 hours  niCARdipine Infusion 2 mG/Hr (10 mL/Hr) IV Continuous <Continuous>  pantoprazole  Injectable 40 milliGRAM(s) IV Push daily    MEDICATIONS  (PRN):  acetaminophen    Suspension .. 650 milliGRAM(s) Enteral Tube every 6 hours PRN Temp greater or equal to 38C (100.4F)    ALLERGIES/INTOLERANCES:  Allergies  No Known Allergies    Intolerances    VITALS & EXAMINATION:  Vital Signs Last 24 Hrs  T(C): 37.5 (16 Aug 2021 12:00), Max: 38.3 (15 Aug 2021 16:00)  T(F): 99.5 (16 Aug 2021 12:00), Max: 100.9 (15 Aug 2021 16:00)  HR: 64 (16 Aug 2021 13:30) (51 - 76)  BP: 138/65 (16 Aug 2021 03:30) (116/56 - 204/90)  BP(mean): 93 (16 Aug 2021 03:30) (79 - 129)  RR: 19 (16 Aug 2021 13:30) (12 - 28)  SpO2: 97% (16 Aug 2021 13:30) (79% - 100%)    General:  Constitutional: Male, appears stated age; intubated    Neurological (>12):  MS: Intubated, no eye opening to noxious stimuli, responds to noxious stimuli with decerebrate posturing in upper and lower extremities.    CNs: PERRLA (R = 2mm, L = 2mm). Visual fields unresponsive to confrontation. Roving eye movements, eyes able to cross midline. Oculocephalic reflex absent. Corneal blink reflex intact. Gag reflex intact.    Motor: Spastic tone in UE b/l. Spasticity in LE b/l, L>R, with L leg external rotation.    Sensory: Responds to noxious stimuli with triple flexion in upper and lower extremities b/l.    Reflexes:              Biceps(C5)       BR(C6)     Triceps(C7)               Patellar(L4)    Achilles(S1)    Plantar Resp  R	2	          2	             2		        2		    2		Up  L	2	          2	             2		        1		    2		Up    No clonus b/l.    LABORATORY:  CBC                       11.4   16.10 )-----------( 242      ( 16 Aug 2021 00:44 )             34.5     Chem -    137  |  105  |  38<H>  ----------------------------<  138<H>  4.3   |  20<L>  |  2.22<H>    Ca    9.3      16 Aug 2021 12:31  Phos  3.7     08-  Mg     2.4     -    TPro  6.8  /  Alb  3.8  /  TBili  0.8  /  DBili  x   /  AST  25  /  ALT  17  /  AlkPhos  84  -    LFTs LIVER FUNCTIONS - ( 16 Aug 2021 00:44 )  Alb: 3.8 g/dL / Pro: 6.8 g/dL / ALK PHOS: 84 U/L / ALT: 17 U/L / AST: 25 U/L / GGT: x           Coagulopathy   Lipid Panel  Chol 169 LDL -- HDL 89 Trig 56  A1c   Cardiac enzymes     U/A Urinalysis Basic - ( 15 Aug 2021 13:52 )    Color: Yellow / Appearance: Slightly Turbid / S.050 / pH: x  Gluc: x / Ketone: Negative  / Bili: Negative / Urobili: Negative   Blood: x / Protein: 30 mg/dL / Nitrite: Negative   Leuk Esterase: Small / RBC: 9 /hpf / WBC 13 /HPF   Sq Epi: x / Non Sq Epi: 10 /hpf / Bacteria: Negative      CSF  Immunological  Other    STUDIES & IMAGING:  Studies (EKG, EEG, EMG, etc):   ** PRELIMINARY EEG reviewed until 10:30 AM  - GPDs at 0.5-1.5 Hz seen indicating diffuse cortical irritability superimposed on moderate to severe background slowing indicative of diffuse cerebral dysfunction.  - No seizures recorded.  Final report to be completed at the completion of the study tomorrow morning.    Radiology (XR, CT, MR, U/S, TTE/SHELBY):  < from: CT Head No Cont (21 @ 04:46) >  IMPRESSION:  No significant interval change in size of the acute intraparenchymal hematoma within the left basal ganglia, currently measuring 5.3 x 2.3 x 3.4 cm. Stable small amount of hemorrhage in the left occipital horn. There is mild surrounding mass effect without appreciable midline shift. Basal cisterns are patent.    There are, however, multiple new small hypodensities within the watershed areas of the JEMIMA/MCA territories bilaterally raising concern for acute ischemic infarcts.     SUBJECTIVE/INTERVAL HISTORY: Patient seen and assessed at bedside. Per ICU team, patient with new decerebrate posturing and "full body tremors" overnight. Patient has been off sedation with propofol since 0600 .    MEDICATIONS  (STANDING):  chlorhexidine 0.12% Liquid 15 milliLiter(s) Oral Mucosa every 12 hours  levETIRAcetam  IVPB 1000 milliGRAM(s) IV Intermittent every 12 hours  niCARdipine Infusion 2 mG/Hr (10 mL/Hr) IV Continuous <Continuous>  pantoprazole  Injectable 40 milliGRAM(s) IV Push daily    MEDICATIONS  (PRN):  acetaminophen    Suspension .. 650 milliGRAM(s) Enteral Tube every 6 hours PRN Temp greater or equal to 38C (100.4F)    ALLERGIES/INTOLERANCES:  Allergies  No Known Allergies    Intolerances    VITALS & EXAMINATION:  Vital Signs Last 24 Hrs  T(C): 37.5 (16 Aug 2021 12:00), Max: 38.3 (15 Aug 2021 16:00)  T(F): 99.5 (16 Aug 2021 12:00), Max: 100.9 (15 Aug 2021 16:00)  HR: 64 (16 Aug 2021 13:30) (51 - 76)  BP: 138/65 (16 Aug 2021 03:30) (116/56 - 204/90)  BP(mean): 93 (16 Aug 2021 03:30) (79 - 129)  RR: 19 (16 Aug 2021 13:30) (12 - 28)  SpO2: 97% (16 Aug 2021 13:30) (79% - 100%)    General:  Constitutional: Male, appears stated age; intubated    Neurological (>12):  MS: Intubated, no eye opening to noxious stimuli, responds to noxious stimuli with decerebrate posturing in upper and lower extremities.    CNs: PERRLA (R = 2mm, L = 2mm). Visual fields unresponsive to confrontation. Roving eye movements, eyes able to cross midline. Oculocephalic reflex absent. Corneal blink reflex intact. Gag reflex intact.    Motor: Spastic tone in UE b/l. Spasticity in LE b/l, L>R, with L leg external rotation.    Reflexes: Responds to noxious stimuli with triple flexion in upper and lower extremities b/l. Plantar reflexes upgoing b/l. No clonus b/l.    LABORATORY:  CBC                       11.4   16.10 )-----------( 242      ( 16 Aug 2021 00:44 )             34.5     Chem     137  |  105  |  38<H>  ----------------------------<  138<H>  4.3   |  20<L>  |  2.22<H>    Ca    9.3      16 Aug 2021 12:31  Phos  3.7       Mg     2.4         TPro  6.8  /  Alb  3.8  /  TBili  0.8  /  DBili  x   /  AST  25  /  ALT  17  /  AlkPhos  84      LFTs LIVER FUNCTIONS - ( 16 Aug 2021 00:44 )  Alb: 3.8 g/dL / Pro: 6.8 g/dL / ALK PHOS: 84 U/L / ALT: 17 U/L / AST: 25 U/L / GGT: x           Coagulopathy   Lipid Panel  Chol 169 LDL -- HDL 89 Trig 56  A1c   Cardiac enzymes     U/A Urinalysis Basic - ( 15 Aug 2021 13:52 )    Color: Yellow / Appearance: Slightly Turbid / S.050 / pH: x  Gluc: x / Ketone: Negative  / Bili: Negative / Urobili: Negative   Blood: x / Protein: 30 mg/dL / Nitrite: Negative   Leuk Esterase: Small / RBC: 9 /hpf / WBC 13 /HPF   Sq Epi: x / Non Sq Epi: 10 /hpf / Bacteria: Negative      CSF  Immunological  Other    STUDIES & IMAGING:  Studies (EKG, EEG, EMG, etc):   ** PRELIMINARY EEG reviewed until 10:30 AM  - GPDs at 0.5-1.5 Hz seen indicating diffuse cortical irritability superimposed on moderate to severe background slowing indicative of diffuse cerebral dysfunction.  - No seizures recorded.  Final report to be completed at the completion of the study tomorrow morning.    Radiology (XR, CT, MR, U/S, TTE/SHELBY):  < from: CT Head No Cont (21 @ 04:46) >  IMPRESSION:  No significant interval change in size of the acute intraparenchymal hematoma within the left basal ganglia, currently measuring 5.3 x 2.3 x 3.4 cm. Stable small amount of hemorrhage in the left occipital horn. There is mild surrounding mass effect without appreciable midline shift. Basal cisterns are patent.    There are, however, multiple new small hypodensities within the watershed areas of the JEMIMA/MCA territories bilaterally raising concern for acute ischemic infarcts.     SUBJECTIVE/INTERVAL HISTORY: Patient seen and assessed at bedside. Per ICU team, patient with new decerebrate posturing and "full body tremors" overnight. Patient has been off sedation with propofol since 0600 .    MEDICATIONS  (STANDING):  chlorhexidine 0.12% Liquid 15 milliLiter(s) Oral Mucosa every 12 hours  levETIRAcetam  IVPB 1000 milliGRAM(s) IV Intermittent every 12 hours  niCARdipine Infusion 2 mG/Hr (10 mL/Hr) IV Continuous <Continuous>  pantoprazole  Injectable 40 milliGRAM(s) IV Push daily    MEDICATIONS  (PRN):  acetaminophen    Suspension .. 650 milliGRAM(s) Enteral Tube every 6 hours PRN Temp greater or equal to 38C (100.4F)    ALLERGIES/INTOLERANCES:  Allergies  No Known Allergies    VITALS & EXAMINATION:  Vital Signs Last 24 Hrs  T(C): 37.5 (16 Aug 2021 12:00), Max: 38.3 (15 Aug 2021 16:00)  T(F): 99.5 (16 Aug 2021 12:00), Max: 100.9 (15 Aug 2021 16:00)  HR: 64 (16 Aug 2021 13:30) (51 - 76)  BP: 138/65 (16 Aug 2021 03:30) (116/56 - 204/90)  BP(mean): 93 (16 Aug 2021 03:30) (79 - 129)  RR: 19 (16 Aug 2021 13:30) (12 - 28)  SpO2: 97% (16 Aug 2021 13:30) (79% - 100%)    General:  Constitutional: Male, appears stated age; intubated.  Head: PERRL, sluggish.  Extremities: No edema.    Neurological (>12):  MS: Intubated, no eye opening to noxious stimuli, responds to noxious stimuli with decerebrate posturing in upper extremities.    CNs: PERRL, sluggish (R = 2mm, L = 2mm). Visual fields unresponsive to confrontation. Roving eye movements, eyes able to cross midline. Oculocephalic reflex absent. Corneal blink reflex intact. Gag reflex intact.    Motor: Spastic tone in UE b/l. Spasticity in LE b/l, L>R, with L leg external rotation.    Reflexes: Responds to noxious stimuli with triple flexion in lower extremities b/l. Plantar reflexes upgoing b/l. No clonus b/l. Decerebrate posturing in upper extremities with minimal tactile stimuli.    Coordination/Gait: Unable to assess.    LABORATORY:  CBC                       11.4   16.10 )-----------( 242      ( 16 Aug 2021 00:44 )             34.5     Chem -    137  |  105  |  38<H>  ----------------------------<  138<H>  4.3   |  20<L>  |  2.22<H>    Ca    9.3      16 Aug 2021 12:31  Phos  3.7     08-  Mg     2.4     -    TPro  6.8  /  Alb  3.8  /  TBili  0.8  /  DBili  x   /  AST  25  /  ALT  17  /  AlkPhos  84  -    LFTs LIVER FUNCTIONS - ( 16 Aug 2021 00:44 )  Alb: 3.8 g/dL / Pro: 6.8 g/dL / ALK PHOS: 84 U/L / ALT: 17 U/L / AST: 25 U/L / GGT: x           Lipid Panel  Chol 169 LDL -- HDL 89 Trig 56    U/A Urinalysis Basic - ( 15 Aug 2021 13:52 )    Color: Yellow / Appearance: Slightly Turbid / S.050 / pH: x  Gluc: x / Ketone: Negative  / Bili: Negative / Urobili: Negative   Blood: x / Protein: 30 mg/dL / Nitrite: Negative   Leuk Esterase: Small / RBC: 9 /hpf / WBC 13 /HPF   Sq Epi: x / Non Sq Epi: 10 /hpf / Bacteria: Negative    STUDIES & IMAGING:  Studies (EKG, EEG, EMG, etc):   ** PRELIMINARY EEG reviewed until 10:30 AM  - GPDs at 0.5-1.5 Hz seen indicating diffuse cortical irritability superimposed on moderate to severe background slowing indicative of diffuse cerebral dysfunction.  - No seizures recorded.  Final report to be completed at the completion of the study tomorrow morning.    Radiology (XR, CT, MR, U/S, TTE/SHELBY):  < from: CT Head No Cont (21 @ 04:46) >  IMPRESSION:  No significant interval change in size of the acute intraparenchymal hematoma within the left basal ganglia, currently measuring 5.3 x 2.3 x 3.4 cm. Stable small amount of hemorrhage in the left occipital horn. There is mild surrounding mass effect without appreciable midline shift. Basal cisterns are patent.    There are, however, multiple new small hypodensities within the watershed areas of the JEMIMA/MCA territories bilaterally raising concern for acute ischemic infarcts.     SUBJECTIVE/INTERVAL HISTORY: Patient seen and assessed at bedside. Per ICU team, patient with new decerebrate posturing and "full body tremors" overnight. Patient has been off sedation with propofol since 0600 .    MEDICATIONS  (STANDING):  chlorhexidine 0.12% Liquid 15 milliLiter(s) Oral Mucosa every 12 hours  levETIRAcetam  IVPB 1000 milliGRAM(s) IV Intermittent every 12 hours  niCARdipine Infusion 2 mG/Hr (10 mL/Hr) IV Continuous <Continuous>  pantoprazole  Injectable 40 milliGRAM(s) IV Push daily    MEDICATIONS  (PRN):  acetaminophen    Suspension .. 650 milliGRAM(s) Enteral Tube every 6 hours PRN Temp greater or equal to 38C (100.4F)    ALLERGIES/INTOLERANCES:  Allergies  No Known Allergies    VITALS & EXAMINATION:  Vital Signs Last 24 Hrs  T(C): 37.5 (16 Aug 2021 12:00), Max: 38.3 (15 Aug 2021 16:00)  T(F): 99.5 (16 Aug 2021 12:00), Max: 100.9 (15 Aug 2021 16:00)  HR: 64 (16 Aug 2021 13:30) (51 - 76)  BP: 138/65 (16 Aug 2021 03:30) (116/56 - 204/90)  BP(mean): 93 (16 Aug 2021 03:30) (79 - 129)  RR: 19 (16 Aug 2021 13:30) (12 - 28)  SpO2: 97% (16 Aug 2021 13:30) (79% - 100%)    General:  Constitutional: Male, appears stated age; intubated.  Head: PERRL, sluggish.  Extremities: No edema.    Neurological (>12):  MS: Intubated, no eye opening to noxious stimuli, responds to noxious stimuli with decerebrate posturing in upper extremities.    CNs: PERRL, sluggish (R = 2mm, L = 2mm). Visual fields unresponsive to confrontation. Roving eye movements, eyes able to cross midline. Oculocephalic reflex absent. Corneal blink reflex intact. Gag reflex intact.    Motor: Spastic tone in UE b/l. Spasticity in LE b/l, L>R, with L leg external rotation.    Reflexes: Responds to noxious stimuli with triple flexion in lower extremities b/l. Plantar reflexes upgoing b/l. No clonus b/l. Decerebrate posturing in upper extremities with minimal tactile stimuli.    Coordination/Gait: Unable to assess.    LABORATORY:  CBC                       11.4   16.10 )-----------( 242      ( 16 Aug 2021 00:44 )             34.5     Chem -    137  |  105  |  38<H>  ----------------------------<  138<H>  4.3   |  20<L>  |  2.22<H>    Ca    9.3      16 Aug 2021 12:31  Phos  3.7     08-  Mg     2.4     -    TPro  6.8  /  Alb  3.8  /  TBili  0.8  /  DBili  x   /  AST  25  /  ALT  17  /  AlkPhos  84  -    LFTs LIVER FUNCTIONS - ( 16 Aug 2021 00:44 )  Alb: 3.8 g/dL / Pro: 6.8 g/dL / ALK PHOS: 84 U/L / ALT: 17 U/L / AST: 25 U/L / GGT: x           Lipid Panel  Chol 169 LDL -- HDL 89 Trig 56    U/A Urinalysis Basic - ( 15 Aug 2021 13:52 )    Color: Yellow / Appearance: Slightly Turbid / S.050 / pH: x  Gluc: x / Ketone: Negative  / Bili: Negative / Urobili: Negative   Blood: x / Protein: 30 mg/dL / Nitrite: Negative   Leuk Esterase: Small / RBC: 9 /hpf / WBC 13 /HPF   Sq Epi: x / Non Sq Epi: 10 /hpf / Bacteria: Negative    STUDIES & IMAGING:  Studies (EKG, EEG, EMG, etc):   ** PRELIMINARY EEG reviewed until 10:30 AM  - GPDs at 0.5-1.5 Hz seen indicating diffuse cortical irritability superimposed on moderate to severe background slowing indicative of diffuse cerebral dysfunction.  - No seizures recorded.  Final report to be completed at the completion of the study tomorrow morning.    Radiology (XR, CT, MR, U/S, TTE/SHELBY):  < from: CT Head No Cont (21 @ 04:46) >  IMPRESSION:  No significant interval change in size of the acute intraparenchymal hematoma within the left basal ganglia, currently measuring 5.3 x 2.3 x 3.4 cm. Stable small amount of hemorrhage in the left occipital horn. There is mild surrounding mass effect without appreciable midline shift. Basal cisterns are patent.    There are, however, multiple new small hypodensities within the watershed areas of the JEMIMA/MCA territories bilaterally raising concern for acute ischemic infarcts.

## 2021-08-17 NOTE — EEG REPORT - NS EEG TEXT BOX
REPORT OF CONTINUOUS VIDEO EEG   [facility]: [address] [place] [address] , [address], [place] [address], Ph#: [phone] [facility]: [address], [place] [address], Ph#: [phone] Office: [address] [place] [address], [place] [address] Ph#: [phone]  Patient Name: LUCY MACIAS Age and : 56y ([birth year]) MRN #: [id number] Location: [place] [address] Referring Physician: Luis Angel Savage  Start Time/Date: x:x or 08:00 on  End Time/Date: 08:00 on  Duration: _____________________________________________________________ STUDY INFORMATION  EEG Recording Technique: The patient underwent continuous Video-EEG monitoring, using Telemetry System hardware on the XLTek Digital System. EEG and video data were stored on a computer hard drive with important events saved in digital archive files. The material was reviewed by a physician (electroencephalographer / epileptologist) on a daily basis. Godfrey and seizure detection algorithms were utilized and reviewed. An EEG Technician attended to the patient, and was available throughout daytime work hours.  The epilepsy center neurologist was available in person or on call 24-hours per day.  EEG Placement and Labeling of Electrodes: The EEG was performed utilizing 20 channel referential EEG connections (coronal over temporal over parasagittal montage) using all standard 10-20 electrode placements with EKG, with additional electrodes placed in the inferior temporal region using the modified 10-10 montage electrode placements for elective admissions, or if deemed necessary. Recording was at a sampling rate of 256 samples per second per channel. Time synchronized digital video recording was done simultaneously with EEG recording. A low light infrared camera was used for low light recording.   _____________________________________________________________ HISTORY  Patient is a 56y old  Male who presents with a chief complaint of Stroke, left basal ganglia intraparenchymal hemorrhage (16 Aug 2021 20:24)   PERTINENT MEDICATION: levETIRAcetam  IVPB 1000 milliGRAM(s) IV Intermittent every 12 hours propofol Infusion 20 MICROgram(s)/kG/Min (12.8 mL/Hr) IV Continuous <Continuous>  _____________________________________________________________ STUDY INTERPRETATION  Findings: The background was continuous and reactive. No posterior dominant rhythm seen.  Background Slowing: Diffuse theta and polymorphic delta slowing, with rare brief 1-2 seconds of attenuation.  Focal Slowing:  Continuous theta/delta slowing in the left > right frontal regions (Max F3).  Sleep Background: Drowsiness and rudimentary stage II sleep transients were recorded.  Other Non-Epileptiform Findings: None were present.  Interictal Epileptiform Activity:  LPDs - 1-3 Hz, bilateral frontal as well as central regions (F4 max / Fz) , F3 max Intermittent runs of GPDs 0.5-1.5 Hz, at times with triphasic morphology  Events: Clinical events: None recorded. Seizures: None recorded.  Activation Procedures:  Hyperventilation was not performed.   Photic stimulation was not performed.   Artifacts: Intermittent myogenic and movement artifacts were noted.  ECG: The heart rate on single channel ECG was predominantly between 60-70 BPM, irregular at times  _____________________________________________________________ EEG SUMMARY/CLASSIFICATION  Abnormal EEG in a comatose patient - Bi-LPDs 1-3 Hz over bilateral frontal central regions - Intermittent runs of GPDs 0.5-1.5 Hz, at times with triphasic morphology - Moderate-severe generalized slowing. - Irregular heart rhythm noted at times. _____________________________________________________________ EEG IMPRESSION/CLINICAL CORRELATE  Abnormal EEG study.  Potential independent epileptogenic foci in the bilateral frontal, central regions superimposed over diffuse cortical hyperexcitability with GPDs, at times with triphasic morphology. Structural or functional abnormality in left > right frontal regions Moderate-severe nonspecific diffuse or multifocal cerebral dysfunction.   *** PRELIMINARY REPORT - PENDING EPILEPSY ATTENDING REVIEW *** _____________________________________________________________  Fan Spencer MD, BRAIN Fellow, [facility] Epilepsy [place]     REPORT OF CONTINUOUS VIDEO EEG   [facility]: [address] [place] [address] , [address], [place] [address], Ph#: [phone] [facility]: [address], [place] [address], Ph#: [phone] Office: [address] [place] [address], [place] [address] Ph#: [phone]  Patient Name: LUCY MACIAS Age and : 56y (67) MRN #: [id number] Location: [place] [address] Referring Physician: Luis Angel Savage  Start Time/Date: x:x or 08:00 on  End Time/Date: 08:00 on  Duration: _____________________________________________________________ STUDY INFORMATION  EEG Recording Technique: The patient underwent continuous Video-EEG monitoring, using Telemetry System hardware on the XLTek Digital System. EEG and video data were stored on a computer hard drive with important events saved in digital archive files. The material was reviewed by a physician (electroencephalographer / epileptologist) on a daily basis. Godfrey and seizure detection algorithms were utilized and reviewed. An EEG Technician attended to the patient, and was available throughout daytime work hours.  The epilepsy center neurologist was available in person or on call 24-hours per day.  EEG Placement and Labeling of Electrodes: The EEG was performed utilizing 20 channel referential EEG connections (coronal over temporal over parasagittal montage) using all standard 10-20 electrode placements with EKG, with additional electrodes placed in the inferior temporal region using the modified 10-10 montage electrode placements for elective admissions, or if deemed necessary. Recording was at a sampling rate of 256 samples per second per channel. Time synchronized digital video recording was done simultaneously with EEG recording. A low light infrared camera was used for low light recording.   _____________________________________________________________ HISTORY  Patient is a 56y old  Male who presents with a chief complaint of Stroke, left basal ganglia intraparenchymal hemorrhage (16 Aug 2021 20:24)   PERTINENT MEDICATION: levETIRAcetam  IVPB 1000 milliGRAM(s) IV Intermittent every 12 hours propofol Infusion 20 MICROgram(s)/kG/Min (12.8 mL/Hr) IV Continuous <Continuous>  _____________________________________________________________ STUDY INTERPRETATION  Findings: The background was continuous and reactive. No posterior dominant rhythm seen.  Background Slowing: Diffuse theta and polymorphic delta slowing, with rare brief 1-2 seconds of attenuation.  Focal Slowing:  Continuous theta/delta slowing in the left > right frontal regions (Max F3).  Sleep Background: Drowsiness and rudimentary stage II sleep transients were recorded.  Other Non-Epileptiform Findings: None were present.  Interictal Epileptiform Activity:  LPDs - 1-3 Hz, bilateral frontal as well as central regions (F4 max / Fz) , F3 max Intermittent runs of GPDs 0.5-1.5 Hz, at times with triphasic morphology  Events: Clinical events: None recorded. Seizures: None recorded.  Activation Procedures:  Hyperventilation was not performed.   Photic stimulation was not performed.   Artifacts: Intermittent myogenic and movement artifacts were noted.  ECG: The heart rate on single channel ECG was predominantly between 60-70 BPM, irregular at times  _____________________________________________________________ EEG SUMMARY/CLASSIFICATION  Abnormal EEG in a comatose patient - Bi-LPDs 1-3 Hz over bilateral frontal central regions - Intermittent runs of GPDs 0.5-1.5 Hz, at times with triphasic morphology - Moderate-severe generalized slowing. - Irregular heart rhythm noted at times. _____________________________________________________________ EEG IMPRESSION/CLINICAL CORRELATE  Abnormal EEG study.  - Potential independent epileptogenic foci in the bilateral frontal, central regions superimposed over diffuse cortical hyperexcitability with GPDs, at times with triphasic morphology suggestive of severe metabolic encephalopathy but can be considered epileptogenic in the correct clinical context.  - Structural or functional abnormality in left > right frontal regions - Moderate-severe nonspecific diffuse or multifocal cerebral dysfunction.   *** PRELIMINARY REPORT - PENDING EPILEPSY ATTENDING REVIEW *** _____________________________________________________________  Fan Spencer MD, BRAIN Fellow, [facility] Epilepsy [place]     REPORT OF CONTINUOUS VIDEO EEG   [facility]: [address] [place] [address] [address], [place] [address], Ph#: [phone] [facility]: [address], [place] [address], Ph#: [phone] Office: [address] [place] [address], [place] [address] Ph#: [phone]  Patient Name: LUCY MACIAS Age and : 56y (67) MRN #: [id number] Location: [place] [address] Referring Physician: Luis Angel Savage  Start Time/Date: 09:00 on 21 End Time/Date: 06:00 on 21 Duration: 20 h 30 mins _____________________________________________________________ STUDY INFORMATION  EEG Recording Technique: The patient underwent continuous Video-EEG monitoring, using Telemetry System hardware on the XLTek Digital System. EEG and video data were stored on a computer hard drive with important events saved in digital archive files. The material was reviewed by a physician (electroencephalographer / epileptologist) on a daily basis. Godfrey and seizure detection algorithms were utilized and reviewed. An EEG Technician attended to the patient, and was available throughout daytime work hours.  The epilepsy center neurologist was available in person or on call 24-hours per day.  EEG Placement and Labeling of Electrodes: The EEG was performed utilizing 20 channel referential EEG connections (coronal over temporal over parasagittal montage) using all standard 10-20 electrode placements with EKG, with additional electrodes placed in the inferior temporal region using the modified 10-10 montage electrode placements for elective admissions, or if deemed necessary. Recording was at a sampling rate of 256 samples per second per channel. Time synchronized digital video recording was done simultaneously with EEG recording. A low light infrared camera was used for low light recording.   _____________________________________________________________ HISTORY  Patient is a 56y old  Male who presents with a chief complaint of Stroke, left basal ganglia intraparenchymal hemorrhage (16 Aug 2021 20:24)   PERTINENT MEDICATION: levETIRAcetam  IVPB 1000 milliGRAM(s) IV Intermittent every 12 hours propofol Infusion 20 MICROgram(s)/kG/Min (12.8 mL/Hr) IV Continuous <Continuous>  _____________________________________________________________ STUDY INTERPRETATION  Findings: The background was continuous and reactive. No posterior dominant rhythm seen.  Background Slowing: Diffuse theta and polymorphic delta slowing, with rare brief 1-2 seconds of attenuation.  Focal Slowing:  Continuous theta/delta slowing in the left > right frontal regions (Max F3).  Sleep Background: Drowsiness and rudimentary stage II sleep transients were recorded.  Other Non-Epileptiform Findings: None were present.  Interictal Epileptiform Activity:  LPDs - 1-3 Hz, bilateral frontal as well as central regions (F4 max / Fz) , F3 max Intermittent runs of GPDs 0.5-1.5 Hz, at times with triphasic morphology  Events: Clinical events: None recorded. Seizures: None recorded.  Activation Procedures:  Hyperventilation was not performed.   Photic stimulation was not performed.   Artifacts: Intermittent myogenic and movement artifacts were noted.  ECG: The heart rate on single channel ECG was predominantly between 60-70 BPM, irregular at times  _____________________________________________________________ EEG SUMMARY/CLASSIFICATION  Abnormal EEG in a comatose patient - Bi-LPDs 1-3 Hz over bilateral frontal central regions - Intermittent runs of GPDs 0.5-1.5 Hz, at times with triphasic morphology - Moderate-severe generalized slowing. - Irregular heart rhythm noted at times. _____________________________________________________________ EEG IMPRESSION/CLINICAL CORRELATE  Abnormal EEG study.  - Potential independent epileptogenic foci in the bilateral frontal, central regions superimposed over diffuse cortical hyperexcitability with GPDs, at times with triphasic morphology suggestive of severe metabolic encephalopathy but can be considered epileptogenic in the correct clinical context.  - Structural or functional abnormality in left > right frontal regions - Moderate-severe nonspecific diffuse or multifocal cerebral dysfunction.   *** PRELIMINARY REPORT - PENDING EPILEPSY ATTENDING REVIEW *** _____________________________________________________________  Fan Spencer MD, BRAIN Fellow, [facility]ilepsy [place]     REPORT OF CONTINUOUS VIDEO EEG   [facility]: [address] [place] [address] , [address], [place] [address], Ph#: [phone] [facility]: [address], [place] [address], Ph#: [phone] Office: [address] [place] [address], [place] [address] Ph#: [phone]  Patient Name: LUCY MACIAS Age and : 56y (67) MRN #: [id number] Location: [place] [address] Referring Physician: Luis Angel Savage  Start Time/Date: 09:00 on 21 End Time/Date: 06:00 on 21 Duration: 20 h 30 mins _____________________________________________________________ STUDY INFORMATION  EEG Recording Technique: The patient underwent continuous Video-EEG monitoring, using Telemetry System hardware on the XLTek Digital System. EEG and video data were stored on a computer hard drive with important events saved in digital archive files. The material was reviewed by a physician (electroencephalographer / epileptologist) on a daily basis. Godfrey and seizure detection algorithms were utilized and reviewed. An EEG Technician attended to the patient, and was available throughout daytime work hours.  The epilepsy center neurologist was available in person or on call 24-hours per day.  EEG Placement and Labeling of Electrodes: The EEG was performed utilizing 20 channel referential EEG connections (coronal over temporal over parasagittal montage) using all standard 10-20 electrode placements with EKG, with additional electrodes placed in the inferior temporal region using the modified 10-10 montage electrode placements for elective admissions, or if deemed necessary. Recording was at a sampling rate of 256 samples per second per channel. Time synchronized digital video recording was done simultaneously with EEG recording. A low light infrared camera was used for low light recording.   _____________________________________________________________ HISTORY  Patient is a 56y old  Male who presents with a chief complaint of Stroke, left basal ganglia intraparenchymal hemorrhage (16 Aug 2021 20:24)   PERTINENT MEDICATION: levETIRAcetam  IVPB 1000 milliGRAM(s) IV Intermittent every 12 hours propofol Infusion 20 MICROgram(s)/kG/Min (12.8 mL/Hr) IV Continuous <Continuous>  _____________________________________________________________ STUDY INTERPRETATION  Findings: The background was continuous and reactive. No posterior dominant rhythm seen.  Background Slowing: Diffuse theta and polymorphic delta slowing, with rare brief 1-2 seconds of attenuation.  Focal Slowing:  Continuous theta/delta slowing in the left > right frontal regions (Max F3).  Sleep Background: Drowsiness and rudimentary stage II sleep transients were recorded.  Other Non-Epileptiform Findings: None were present.  Interictal Epileptiform Activity:  LPDs - 1-3 Hz, right frontal-central region (F4 max / Fz) Occasional independent left frontal discharges (F3 max) Intermittent runs of GPDs 0.5-1.5 Hz, at times with triphasic morphology.    Events: Clinical events: None recorded. Seizures: None recorded.  Activation Procedures:  Hyperventilation was not performed.   Photic stimulation was not performed.   Artifacts: Intermittent myogenic and movement artifacts were noted.  ECG: The heart rate on single channel ECG was predominantly between 60-70 BPM, irregular at times  _____________________________________________________________ EEG SUMMARY/CLASSIFICATION  Abnormal EEG in a comatose patient - LPDs - 1-3 Hz, right frontal-central region (F4 max / Fz) - Occasional independent left frontal discharges (F3 max) - Intermittent runs of GPDs 0.5-1.5 Hz, at times with triphasic morphology.   - Moderate-severe generalized slowing. - Irregular heart rhythm noted at times. _____________________________________________________________ EEG IMPRESSION/CLINICAL CORRELATE  Abnormal EEG study.  - Potential independent epileptogenic foci in the bilateral frontal, central regions (right greater than left) superimposed over diffuse cortical hyperexcitability with GPDs, at times with triphasic morphology suggestive of severe metabolic encephalopathy but can be considered epileptogenic in the correct clinical context.  - Structural or functional abnormality in left > right frontal regions - Moderate-severe nonspecific diffuse or multifocal cerebral dysfunction.  _____________________________________________________________  Fan Spencer MD, BRAIN Fellow, [facility]  Todd Irving MD EEG/Epilepsy Attending   REPORT OF CONTINUOUS VIDEO EEG   [facility]: [address] [place] [address] , [address], [place] [address], Ph#: [phone] [facility]: [address], [place] [address], Ph#: [phone] Office: [address] [place] [address], [place] [address] Ph#: [phone]  Patient Name: LUCY MACIAS Age and : 56y (67) MRN #: [id number] Location: [place] [address] Referring Physician: Luis Angel Savage  Start Time/Date: 09:00 on 21 End Time/Date: 06:00 on 21 Duration: 20 h 30 mins _____________________________________________________________ STUDY INFORMATION  EEG Recording Technique: The patient underwent continuous Video-EEG monitoring, using Telemetry System hardware on the XLTek Digital System. EEG and video data were stored on a computer hard drive with important events saved in digital archive files. The material was reviewed by a physician (electroencephalographer / epileptologist) on a daily basis. Godfrey and seizure detection algorithms were utilized and reviewed. An EEG Technician attended to the patient, and was available throughout daytime work hours.  The epilepsy center neurologist was available in person or on call 24-hours per day.  EEG Placement and Labeling of Electrodes: The EEG was performed utilizing 20 channel referential EEG connections (coronal over temporal over parasagittal montage) using all standard 10-20 electrode placements with EKG, with additional electrodes placed in the inferior temporal region using the modified 10-10 montage electrode placements for elective admissions, or if deemed necessary. Recording was at a sampling rate of 256 samples per second per channel. Time synchronized digital video recording was done simultaneously with EEG recording. A low light infrared camera was used for low light recording.   _____________________________________________________________ HISTORY  Patient is a 56y old  Male who presents with a chief complaint of Stroke, left basal ganglia intraparenchymal hemorrhage (16 Aug 2021 20:24)   PERTINENT MEDICATION: levETIRAcetam  IVPB 1000 milliGRAM(s) IV Intermittent every 12 hours propofol Infusion 20 MICROgram(s)/kG/Min (12.8 mL/Hr) IV Continuous <Continuous>  _____________________________________________________________ STUDY INTERPRETATION  Findings: The background was continuous and reactive. No posterior dominant rhythm seen.  Background Slowing: Diffuse theta and polymorphic delta slowing, with rare brief 1-2 seconds of attenuation.  Focal Slowing:  Continuous theta/delta slowing in the left > right frontal regions (Max F3).  Sleep Background: Drowsiness and rudimentary stage II sleep transients were recorded.  Other Non-Epileptiform Findings: None were present.  Interictal Epileptiform Activity:  LPDs - 1-3 Hz, right frontal-central region (F4 max / Fz) Occasional independent left frontal sharp-wave discharges (F3 max) Intermittent runs of GPDs 0.5-1.5 Hz, at times with triphasic morphology.    Events: Clinical events: None recorded. Seizures: None recorded.  Activation Procedures:  Hyperventilation was not performed.   Photic stimulation was not performed.   Artifacts: Intermittent myogenic and movement artifacts were noted.  ECG: The heart rate on single channel ECG was predominantly between 60-70 BPM, irregular at times  _____________________________________________________________ EEG SUMMARY/CLASSIFICATION  Abnormal EEG in a comatose patient - LPDs - 1-3 Hz, right frontal-central region (F4 max / Fz) - Occasional independent left frontal sharp wave discharges (F3 max) - Intermittent runs of GPDs 0.5-1.5 Hz, at times with triphasic morphology.   - Moderate-severe generalized slowing. - Irregular heart rhythm noted at times. _____________________________________________________________ EEG IMPRESSION/CLINICAL CORRELATE  Abnormal EEG study.  - Potential independent epileptogenic foci in the bilateral frontal, central regions (right greater than left) superimposed over diffuse cortical hyperexcitability with GPDs, at times with triphasic morphology suggestive of severe metabolic encephalopathy but can be considered epileptogenic in the correct clinical context.  - Structural or functional abnormality in left > right frontal regions - Moderate-severe nonspecific diffuse or multifocal cerebral dysfunction.  _____________________________________________________________  Fan Spencer MD, BRAIN Fellow, [facility]  Todd Irving MD EEG/Epilepsy Attending

## 2021-08-17 NOTE — DIETITIAN INITIAL EVALUATION ADULT. - OTHER INFO
Weight history unavailable. Dosing weight is 107kg    Current ordered for Vital AF @ 65ml/hr x24hr which would provide 1560 ml formula, 1872 kcal, 117 g protein (17 kcal/kg using dosing weight 107kg, 1.6 g/kg protein using IBW 72.5kg). Feeds initiated 8/15, not yet at goal rate since was held for MRI 8/16, currently infusing at 60ml/hr. Noted with MILDRED, K+/P WNL. No propofol use at this time. Receiving 2% NS @ 50ml/hr for goal Na 140-150, per chart. No BM since admission; no reports of GI distress from team at this time. No BM since admission. Not on any apparent bowel regimen. Noted with 1+ generalized edema, 2+ edema to tongue per flowsheets. No pressure injuries. Weight history unavailable. Dosing weight is 107kg    Current ordered for Vital AF @ 65ml/hr x24hr which would provide 1560 ml formula, 1872 kcal, 117 g protein (17 kcal/kg using dosing weight 107kg, 1.6 g/kg protein using IBW 72.5kg). Feeds initiated 8/15, not yet at goal rate since was held for MRI 8/16, currently infusing at 60ml/hr. Noted with MILDRED, K+/P WNL. No propofol use at this time. Receiving 2% NS @ 50ml/hr for goal Na 140-150, per chart. RN confirms no BM since admission; no reports of GI distress from team at this time. Not on any apparent bowel regimen. Noted with 1+ generalized edema, 2+ edema to tongue per flowsheets. No pressure injuries.

## 2021-08-17 NOTE — PROGRESS NOTE ADULT - ASSESSMENT
57yo man w/ no known PMH, no medications, presents to ED as transfer from [facility] with left basal ganglia intraparenchymal hemorrhage and incidental finding of Type A dissection.  Patient's brother at bedside and provides history. 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 At [place], CTH noncon was done and demonstrates left basal ganglia hemorrhage. Patient subsequently transferred to [facility] ED, repeat imaging showed possible Type A aortic dissection. Cardiothoracic surgery consulted, CTA CAP showed Type A dissection from distal ascending aorta to distal arch. Pt in [facility] ED started on  nicardipine gtt.  pt also noticed with cr 2. per brother pt has not seen a primary doc.  pt required intubation for airway protection       1- MILDRED   2- type A aortic dissection   3- ICH   4- HTN     mildred in setting of type A aortic dissection with iv contrast with underlying likely ckd. cr is rising uo remains low   cardene drip for bp control  2% nacl drip to keep na 145-150 given ICH   uo is improving and so is cr as well.  vent support   d/w CTU team

## 2021-08-17 NOTE — PROGRESS NOTE ADULT - SUBJECTIVE AND OBJECTIVE BOX
LUCY MACIAS  MRN-[id number]  Patient is a 56y old  Male who presents with a chief complaint of Stroke, left basal ganglia intraparenchymal hemorrhage (17 Aug 2021 20:49)    HPI:  57yo man w/ no known PMH, no medications, presents to ED as transfer from [facility] with left basal ganglia intraparenchymal hemorrhage and incidental finding of Type A dissection.  Patient's brother at bedside and provides history. 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 Pt denies any fall, syncope, trauma although per EMS reports patient had a fall and was found with word finding difficulty and right-sided weakness. At [place], CTH noncon was done and demonstrates left basal ganglia hemorrhage. Patient subsequently transferred to [facility] ED, repeat imaging showed possible Type A aortic dissection. Cardiothoracic surgery consulted, CTA CAP showed Type A dissection from distal ascending aorta to distal arch. Pt denies any chest pain, palpitations, SOB, N/V/D, numbness, tingling in extremities. Pt in [facility] ED on nicardipine gtt. (13 Aug 2021 14:59)      Surgery/Hospital course:  8/13  Type A dissection, Acute Intraparenchymal hematoma     Today/Overnight:    Vital Signs Last 24 Hrs  T(C): 38.2 (17 Aug 2021 20:00), Max: 38.4 (17 Aug 2021 13:00)  T(F): 100.8 (17 Aug 2021 20:00), Max: 101.1 (17 Aug 2021 13:00)  HR: 88 (17 Aug 2021 21:10) (60 - 90)  BP: --  BP(mean): --  RR: 27 (17 Aug 2021 20:45) (10 - 70)  SpO2: 97% (17 Aug 2021 21:10) (85% - 100%)  ============================I/O===========================  I&O's Detail    16 Aug 2021 07:01  -  17 Aug 2021 07:00  --------------------------------------------------------  IN:    Labetalol: 135 mL    Mannitol: 200 mL    NiCARdipine: 507.5 mL    Propofol: 18.1 mL    sodium chloride 2%: 300 mL    Vital1.0: 890 mL  Total IN: 2050.6 mL    OUT:    Indwelling Catheter - Urethral (mL): 1595 mL  Total OUT: 1595 mL    Total NET: 455.6 mL      17 Aug 2021 07:01  -  17 Aug 2021 21:49  --------------------------------------------------------  IN:    IV PiggyBack: 150 mL    NiCARdipine: 687.5 mL    Propofol: 189.6 mL    sodium chloride 2%: 500 mL    Vital1.0: 895 mL  Total IN: 2422.1 mL    OUT:    Indwelling Catheter - Urethral (mL): 1205 mL  Total OUT: 1205 mL    Total NET: 1217.1 mL        ============================ LABS =========================                        12.2   23.07 )-----------( 253      ( 17 Aug 2021 00:14 )             37.2     08-17    144  |  114<H>  |  39<H>  ----------------------------<  133<H>  4.2   |  17<L>  |  1.81<H>    Ca    8.8      17 Aug 2021 18:01  Phos  3.3     08-17  Mg     2.6     08-17    TPro  6.8  /  Alb  3.5  /  TBili  0.7  /  DBili  x   /  AST  24  /  ALT  19  /  AlkPhos  102  08-17    LIVER FUNCTIONS - ( 17 Aug 2021 18:01 )  Alb: 3.5 g/dL / Pro: 6.8 g/dL / ALK PHOS: 102 U/L / ALT: 19 U/L / AST: 24 U/L / GGT: x             ABG - ( 17 Aug 2021 16:24 )  pH, Arterial: 7.40  pH, Blood: x     /  pCO2: 31    /  pO2: 64    / HCO3: 19    / Base Excess: -4.7  /  SaO2: 94.6                ======================Micro/Rad/Cardio=================  Culture: Reviewed   CXR: Reviewed  Echo: Reviewed  ======================================================  PAST MEDICAL & SURGICAL HISTORY:  No pertinent past medical history    No significant past surgical history      ========================ASSESSMENT ================  Left basal ganglia intraparenchymal hemorrhagic stroke   Type A dissection, Acute Intraparenchymal hematoma   CKD  Leukocytosis     Plan:  ====================== NEUROLOGY=====================  Left basal ganglia intraparenchymal hemorrhagic stroke - no surgical intervention as per neurosurgery   Abnormal EEF on 8/17, structural or functional abnormality in left > right frontal regions and moderate-severe nonspecific diffuse or multifocal cerebral dysfunction.   Sedated with IV Propofol to maintain vent synchrony   Keppra for seizures   Tylenol PRN for fevers     acetaminophen    Suspension .. 650 milliGRAM(s) Enteral Tube every 6 hours PRN Temp greater or equal to 38C (100.4F)  levETIRAcetam  IVPB 1000 milliGRAM(s) IV Intermittent every 12 hours  propofol Infusion 20 MICROgram(s)/kG/Min (12.8 mL/Hr) IV Continuous <Continuous>    ==================== RESPIRATORY======================  Respiratory status required full ventilatory support, close monitoring of respiratory rate and breathing pattern, the following of ABG’s with A-line monitoring, continuous pulse oximetry monitoring     Mechanical Ventilation:  Mode: AC/ CMV (Assist Control/ Continuous Mandatory Ventilation)  RR (machine): 12  TV (machine): 650  FiO2: 50  PEEP: 5  ITime: 0.9  MAP: 11  PIP: 22      ====================CARDIOVASCULAR==================  Type A dissection, not a candidate for surgical intervention due to stroke   Blood pressure support with IV Cardene for HTN   Continue hemodynamic monitoring     niCARdipine Infusion 2 mG/Hr (10 mL/Hr) IV Continuous <Continuous>    ===================HEMATOLOGIC/ONC ===================  Monitor H&H/Plts     VTE prophylaxis, heparin   Injectable 5000 Unit(s) SubCutaneous every 8 hours    ===================== RENAL =========================  MILDRED with underlying CKD, renal following   Continue monitoring I&Os, BUN/Cr, and urine output   Replete lytes PRN. Keep K> 4 and Mg >2     ==================== GASTROINTESTINAL===================  Tolerating tube feeds, Vital AF @ 65cc/hr     GI prophylaxis, pantoprazole  Injectable 40 milliGRAM(s) IV Push daily  sodium chloride 2% . 1000 milliLiter(s) (50 mL/Hr) IV Continuous <Continuous>    =======================    ENDOCRINE  =====================  Continue monitoring blood glucose closely for need to initiate sliding scale     ========================INFECTIOUS DISEASE================  Temp 100.8F, WBC rising 16.10->23.07   Continue trending WBC and monitoring fever curve   SCx on 8/15: +Staphylococcus aureus, c/w Zosyn and Vancomycin     piperacillin/tazobactam IVPB.. 3.375 Gram(s) IV Intermittent every 8 hours  vancomycin  IVPB          Patient requires continuous monitoring with bedside rhythm monitoring, pulse oximetry monitoring, and continuous central venous and arterial pressure monitoring; and intermittent blood gas analysis.  Care plan discussed with ICU care team.    Patient remained critical, at risk for life threatening decompensation.   I have spent 35 minutes providing acute care with multiple re-evaluations throughout the evening.     By signing my name below, I, Hannah Coronado, attest that this documentation has been prepared under the direction and in the presence of Dwayne Mendez NP.  Electronically signed: Shannon Buchanan Santpal Chawla, personally performed the services described in this documentation. All medical record entries made by the scribe were at my direction and in my presence. I have reviewed the chart and agree that the record reflects my personal performance and is accurate and complete  Electronically signed: Dwayne Mendez NP, 08-17-21 @ 21:49

## 2021-08-17 NOTE — DIETITIAN INITIAL EVALUATION ADULT. - PERTINENT MEDS FT
MEDICATIONS  (STANDING):  chlorhexidine 0.12% Liquid 15 milliLiter(s) Oral Mucosa every 12 hours  labetalol Injectable 10 milliGRAM(s) IV Push once  levETIRAcetam  IVPB 1000 milliGRAM(s) IV Intermittent every 12 hours  niCARdipine Infusion 2 mG/Hr (10 mL/Hr) IV Continuous <Continuous>  pantoprazole  Injectable 40 milliGRAM(s) IV Push daily  piperacillin/tazobactam IVPB.. 3.375 Gram(s) IV Intermittent every 8 hours  propofol Infusion 20 MICROgram(s)/kG/Min (12.8 mL/Hr) IV Continuous <Continuous>  sodium chloride 2% . 1000 milliLiter(s) (50 mL/Hr) IV Continuous <Continuous>  vancomycin  IVPB        MEDICATIONS  (PRN):  acetaminophen    Suspension .. 650 milliGRAM(s) Enteral Tube every 6 hours PRN Temp greater or equal to 38C (100.4F)

## 2021-08-17 NOTE — DIETITIAN INITIAL EVALUATION ADULT. - REASON FOR ADMISSION
Per chart: 56 yr old male with aphasia, R hemiplegia found to have HTN related large L basal ganglia / intraparenchymal bleed  with Type A-aortic dissection since admission date on 8/13/21 neuro exam has deteriorated to unresponsiveness, with uncontrolled body movements moving LUE & both legs, decerebrate posturing  S/P CT scan x 5  demonstrating stable L IPH, minimal mass effect , no hydrocephalus, minimal bleed extension into ventricle. CTA chest stable Type A-aortic dissection

## 2021-08-17 NOTE — DIETITIAN INITIAL EVALUATION ADULT. - PERTINENT LABORATORY DATA
Labs: 08-17 @ 04:56: Sodium 141, Potassium 4.0, Calcium 8.9, Magnesium 2.6, Phosphorus 3.3, BUN 42<H>, Creatinine 2.23<H>, Glucose 138<H>, Alk Phos 89, ALT/SGPT 18, AST/SGOT 27, Albumin 3.1<L>, Prealbumin --, Total Bilirubin 0.8, Hemoglobin --, Hematocrit --, Ferritin --, C-Reactive Protein --, Creatine Kinase <<27>  08-17 @ 00:14: Sodium --, Potassium --, Calcium --, Magnesium --, Phosphorus --, BUN --, Creatinine --, Glucose --, Alk Phos --, ALT/SGPT --, AST/SGOT --, Albumin --, Prealbumin --, Total Bilirubin --, Hemoglobin 12.2<L>, Hematocrit 37.2<L>, Ferritin --, C-Reactive Protein --, Creatine Kinase <<27>  08-16 @ 22:25: Sodium 140, Potassium 4.2, Calcium 9.4, Magnesium 2.5, Phosphorus 3.5, BUN 41<H>, Creatinine 2.29<H>, Glucose 144<H>, Alk Phos 94, ALT/SGPT 19, AST/SGOT 28, Albumin 3.5, Prealbumin --, Total Bilirubin 0.8, Hemoglobin --, Hematocrit --, Ferritin --, C-Reactive Protein --, Creatine Kinase <<27>  08-16 @ 12:31: Sodium 137, Potassium 4.3, Calcium 9.3, Magnesium --, Phosphorus --, BUN 38<H>, Creatinine 2.22<H>, Glucose 138<H>, Alk Phos --, ALT/SGPT --, AST/SGOT --, Albumin --, Prealbumin --, Total Bilirubin --, Hemoglobin --, Hematocrit --, Ferritin --, C-Reactive Protein --, Creatine Kinase <<27>      Triglycerides, Serum: 56 mg/dL (08-13-21 @ 13:21)

## 2021-08-17 NOTE — DIETITIAN INITIAL EVALUATION ADULT. - ORAL INTAKE PTA/DIET HISTORY
Unable to obtain subjective information to assess nutrition/weight history at this time as pt is intubated and sedated. Per chart, NKFA. PTA micronutrient supplementation unknown.

## 2021-08-17 NOTE — PROGRESS NOTE ADULT - ASSESSMENT
ASSESSMENT: 55 yo M no PMH presented with L BG IPH, small IVH and incidental type A aortic dissection with progressively worsening mental exam requiring intubation, concerning for severe ischemic brain injury    NEURO:  Cont neuro checks q2h, vitals q1  Cont VEEG, cont keppra, epilepsy following, rule out ischemic brain injury vs NCSE  Treat underlying toxic/metabolic/infectious problems, minimize sedation as tolerated for neuro exams  Na goal normal given mild brain edema  No NS intervention needed    PULM: Cont Vent bundle, ABGs and CXRs per CTU  Pseudomonas in sputum, continue abx    CV: No intervention for dissection for now as per CT/Vascular  Keep -140mmHg    RENAL: on HTS 2% Na goal 135-145    GI: TF  GI prophylaxis [] not indicated [x] PPI     ENDO:   Goal euglycemia (-180)    HEME/ONC:  VTE prophylaxis: [x] SCDs [x] chemoprophylaxis SQH    ID:  Vanc and zosyn    CODE STATUS:  [x] Full Code [] DNR [] DNI [] Palliative/Comfort Care    DISPOSITION:  [x] CTU [] Stroke Unit [] Floor [] EMU [] RCU [] PCU        Time seen: 15  Time spent: 45  critical care minutes    Contact: [phone]

## 2021-08-17 NOTE — PROGRESS NOTE ADULT - SUBJECTIVE AND OBJECTIVE BOX
CRITICAL CARE ATTENDING - CTICU    MEDICATIONS  (STANDING):  chlorhexidine 0.12% Liquid 15 milliLiter(s) Oral Mucosa every 12 hours  heparin   Injectable 5000 Unit(s) SubCutaneous every 8 hours  labetalol Injectable 10 milliGRAM(s) IV Push once  levETIRAcetam  IVPB 1000 milliGRAM(s) IV Intermittent every 12 hours  niCARdipine Infusion 2 mG/Hr (10 mL/Hr) IV Continuous <Continuous>  pantoprazole  Injectable 40 milliGRAM(s) IV Push daily  piperacillin/tazobactam IVPB.. 3.375 Gram(s) IV Intermittent every 8 hours  propofol Infusion 20 MICROgram(s)/kG/Min (12.8 mL/Hr) IV Continuous <Continuous>  sodium chloride 2% . 1000 milliLiter(s) (50 mL/Hr) IV Continuous <Continuous>  vancomycin  IVPB                                        12.2   23.07 )-----------( 253      ( 17 Aug 2021 00:14 )             37.2           141  |  109<H>  |  42<H>  ----------------------------<  138<H>  4.0   |  17<L>  |  2.23<H>    Ca    8.9      17 Aug 2021 04:56  Phos  3.3       Mg     2.6         TPro  6.2  /  Alb  3.1<L>  /  TBili  0.8  /  DBili  x   /  AST  27  /  ALT  18  /  AlkPhos  89            Mode: AC/ CMV (Assist Control/ Continuous Mandatory Ventilation)  RR (machine): 12  TV (machine): 650  FiO2: 50  PEEP: 5  ITime: 1  MAP: 10  PIP: 24      Daily     Daily Weight in k (17 Aug 2021 00:00)      -16 @ : @ 07:00  --------------------------------------------------------  IN: 2050.6 mL / OUT: 1595 mL / NET: 455.6 mL     @ 07: @ 12:08  --------------------------------------------------------  IN: 462.5 mL / OUT: 375 mL / NET: 87.5 mL        Critically Ill patient  : [ ] preoperative ,   [ ] post operative  [x] non operative management     Requires :  [ x] Arterial Line   [ x] Central Line  [ ] PA catheter  [ ] IABP  [ ] ECMO  [ ] LVAD  [ x] Ventilator  [ ] pacemaker [ ] Impella.                      [x ] ABG's     [x ] Pulse Oxymetry Monitoring  Bedside evaluation , monitoring , treatment of hemodynamics , fluids , IVP/ IVCD meds.        Diagnosis:     Admitted Syncopal episode / hypertension     L  Intracranial Bleed     Type A Aortic Dissection     Multiple brain infarcts    Hypertension     respiratory failure     Ventilator Management:  [x ]AC-rest    [ ]CPAP-PS Wean    [ ]Trach Collar     [ ]Extubate    [ ] T-Piece  [ ]peep>5     Requires chest PT, pulmonary toilet, ambu bagging, suctioning to maintain SaO2,  patent airway and treat atelectasis.     Difficult weaning process - multiple organ system involvement in critically ill patient     Obtunded mental status / posturing     Renal Failure - Acute Kidney Injury     Tolerates NG / NJ feeds at [ ] goal rate    [ ] trophic rate    [ ]       rate     L   Pneumonia                         Discussed with CT surgeon, Physician's Assistant - Nurse Practitioner- Critical care medicine team.   Discussed at  AM / PM rounds.   Chart, labs , films reviewed.    Cumulative Critical Care Time Given Today :  30 min

## 2021-08-17 NOTE — PROGRESS NOTE ADULT - SUBJECTIVE AND OBJECTIVE BOX
[facility]   [phone]  RENAL FOLLOW UP NOTE  --------------------------------------------------------------------------------  Chief Complaint:    24 hour events/subjective:    seen earlier.   intubated     PAST HISTORY  --------------------------------------------------------------------------------  No significant changes to PMH, PSH, FHx, SHx, unless otherwise noted    ALLERGIES & MEDICATIONS  --------------------------------------------------------------------------------  Allergies    No Known Allergies    Intolerances      Standing Inpatient Medications  chlorhexidine 0.12% Liquid 15 milliLiter(s) Oral Mucosa every 12 hours  heparin   Injectable 5000 Unit(s) SubCutaneous every 8 hours  levETIRAcetam  IVPB 1000 milliGRAM(s) IV Intermittent every 12 hours  niCARdipine Infusion 2 mG/Hr IV Continuous <Continuous>  pantoprazole  Injectable 40 milliGRAM(s) IV Push daily  piperacillin/tazobactam IVPB.. 3.375 Gram(s) IV Intermittent every 8 hours  propofol Infusion 20 MICROgram(s)/kG/Min IV Continuous <Continuous>  sodium chloride 2% . 1000 milliLiter(s) IV Continuous <Continuous>  vancomycin  IVPB        PRN Inpatient Medications  acetaminophen    Suspension .. 650 milliGRAM(s) Enteral Tube every 6 hours PRN      REVIEW OF SYSTEMS  --------------------------------------------------------------------------------        VITALS/PHYSICAL EXAM  --------------------------------------------------------------------------------  T(C): 38.2 (08-17-21 @ 20:00), Max: 38.4 (08-17-21 @ 13:00)  HR: 80 (08-17-21 @ 20:15) (60 - 90)  BP: --  RR: 27 (08-17-21 @ 20:15) (10 - 70)  SpO2: 97% (08-17-21 @ 20:15) (85% - 100%)  Wt(kg): --  Height (cm): 175.3 (08-16-21 @ 11:30)      08-16-21 @ 07:01  -  08-17-21 @ 07:00  --------------------------------------------------------  IN: 2050.6 mL / OUT: 1595 mL / NET: 455.6 mL    08-17-21 @ 07:01  -  08-17-21 @ 20:50  --------------------------------------------------------  IN: 2213.2 mL / OUT: 1080 mL / NET: 1133.2 mL      Physical Exam:  	    	Gen: intubated   	no jvd   	Pulm: decrease bs  no rales or ronchi or wheezing  	CV: RRR, S1S2; no rub  	Abd: +BS, soft, nontender/nondistended  	UE: Warm, no cyanosis  no clubbing,  no edema  	LE: Warm, no cyanosis  no clubbing, no edema  	    LABS/STUDIES  --------------------------------------------------------------------------------              12.2   23.07 >-----------<  253      [08-17-21 @ 00:14]              37.2     144  |  114  |  39  ----------------------------<  133      [08-17-21 @ 18:01]  4.2   |  17  |  1.81        Ca     8.8     [08-17-21 @ 18:01]      Mg     2.6     [08-17-21 @ 04:56]      Phos  3.3     [08-17-21 @ 04:56]    TPro  6.8  /  Alb  3.5  /  TBili  0.7  /  DBili  x   /  AST  24  /  ALT  19  /  AlkPhos  102  [08-17-21 @ 18:01]          Creatinine Trend:  SCr 1.81 [08-17 @ 18:01]  SCr 1.89 [08-17 @ 13:15]  SCr 2.23 [08-17 @ 04:56]  SCr 2.29 [08-16 @ 22:25]  SCr 2.22 [08-16 @ 12:31]              Urinalysis - [08-15-21 @ 13:52]      Color Yellow / Appearance Slightly Turbid / SG 1.050 / pH 5.5      Gluc Negative / Ketone Negative  / Bili Negative / Urobili Negative       Blood Moderate / Protein 30 mg/dL / Leuk Est Small / Nitrite Negative      RBC 9 / WBC 13 / Hyaline 106 / Gran  / Sq Epi  / Non Sq Epi 10 / Bacteria Negative      TSH 1.33      [08-13-21 @ 14:52]  Lipid: chol 169, TG 56, HDL 89, LDL --      [08-13-21 @ 13:21]

## 2021-08-17 NOTE — PROGRESS NOTE ADULT - SUBJECTIVE AND OBJECTIVE BOX
SUMMARY: 55yo man w/ no known PMH, no prescription medications, presented to ED with a reported fall, aphasia and R sided weakness,was transferred from [facility] after CTH showed L BG IPH and further imaging found incidental Type A dissection. Of note, pt +Utox for amphetamine and methadone.    HOSPITAL COURSE:  Pt did present w aphasia and R hemiplegia but was following commands. Admitted to CTU for dissection management, no intervention done. Worsening mental status on 8/14 requiring intubation. Pt off sedation having poor neuro exam described as posturing, TF, CTH stable, no neurosurgical intervention. Pt on VEEG showing GPDs, diffuse cortical hyperexcitability, potential epileptogenic foci in bilat frontal central regions. MRI showing multiple watershed infarcts. Pt w contrast induced MILDRED and fevers w +Pseudomonas in sputum. NSICU consulted for progressively worse neurological exam    REVIEW OF SYSTEMS: Unobtainable    ALLERGIES: Allergies  No Known Allergies  Intolerances        VITALS/DATA/ORDERS: [x] Reviewed    DEVICES:   [] Restraints [x] PIVs [x] ET tube [] central line [] PICC [x] arterial line [x] valencia [] NGT/OGT [] EVD [] LD [] ROSINA/HMV [] LiCOX [] ICP monitor [] Trach [] PEG [] Chest Tube [] other:    EXAMINATION:  General: No acute distress  HEENT: Anicteric sclerae  Cardiac: RRR  Lungs: Clear  Abdomen: Soft, non-tender, +BS  Neurologic: Intubated, off propofol, pt is not opening eyes to noxious stimuli, not following commands, PERRL 3mm, roving with eyes, no gaze preference, No corneals seen, +oculocephalic, grimaces to painful stimuli, +cough +gag, withdraws in BUE, spontaneous movements in lower extremities, TF to pain in BLE

## 2021-08-17 NOTE — DIETITIAN INITIAL EVALUATION ADULT. - OTHER CALCULATIONS
Weight(s) used for calculations: considering BMI>30 and critical illness, dosing for energy, IBW for protein, fluids per team.

## 2021-08-18 NOTE — PROGRESS NOTE ADULT - ASSESSMENT
57yo man w/ no known PMH, no medications, presents to ED as transfer from [facility] with left basal ganglia intraparenchymal hemorrhage and incidental finding of Type A dissection.  Patient's brother at bedside and provides history. 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 At [place], CTH noncon was done and demonstrates left basal ganglia hemorrhage. Patient subsequently transferred to [facility] ED, repeat imaging showed possible Type A aortic dissection. Cardiothoracic surgery consulted, CTA CAP showed Type A dissection from distal ascending aorta to distal arch. Pt in [facility] ED started on  nicardipine gtt.  pt also noticed with cr 2. per brother pt has not seen a primary doc.  pt required intubation for airway protection       1- MILDRED   2- type A aortic dissection   3- ICH   4- HTN     mildred in setting of type A aortic dissection with iv contrast with underlying likely ckd. cr is rising uo remains low   cardene drip for bp control  2% nacl drip to keep na 145-150 given ICH given na is higher now taper off 2% if na remains this high   uo is improving and so is cr as well.  vent support   d/w CTU team

## 2021-08-18 NOTE — PROGRESS NOTE ADULT - SUBJECTIVE AND OBJECTIVE BOX
[facility]   [phone]  RENAL FOLLOW UP NOTE  --------------------------------------------------------------------------------  Chief Complaint:    24 hour events/subjective:    seen earlier   intubated      PAST HISTORY  --------------------------------------------------------------------------------  No significant changes to PMH, PSH, FHx, SHx, unless otherwise noted    ALLERGIES & MEDICATIONS  --------------------------------------------------------------------------------  Allergies    No Known Allergies    Intolerances      Standing Inpatient Medications  chlorhexidine 0.12% Liquid 15 milliLiter(s) Oral Mucosa every 12 hours  heparin   Injectable 5000 Unit(s) SubCutaneous every 8 hours  levETIRAcetam  IVPB 1000 milliGRAM(s) IV Intermittent every 12 hours  niCARdipine Infusion 2 mG/Hr IV Continuous <Continuous>  pantoprazole  Injectable 40 milliGRAM(s) IV Push daily  piperacillin/tazobactam IVPB.. 3.375 Gram(s) IV Intermittent every 8 hours  propofol Infusion 20 MICROgram(s)/kG/Min IV Continuous <Continuous>  sodium chloride 2% . 1000 milliLiter(s) IV Continuous <Continuous>    PRN Inpatient Medications  acetaminophen    Suspension .. 650 milliGRAM(s) Enteral Tube every 6 hours PRN      REVIEW OF SYSTEMS  --------------------------------------------------------------------------------      VITALS/PHYSICAL EXAM  --------------------------------------------------------------------------------  T(C): 38.3 (08-18-21 @ 20:00), Max: 38.5 (08-18-21 @ 18:45)  HR: 66 (08-18-21 @ 22:00) (56 - 79)  BP: --  RR: 21 (08-18-21 @ 22:00) (0 - 77)  SpO2: 98% (08-18-21 @ 22:00) (88% - 100%)  Wt(kg): --        08-17-21 @ 07:01  -  08-18-21 @ 07:00  --------------------------------------------------------  IN: 4778.5 mL / OUT: 1990 mL / NET: 2788.5 mL    08-18-21 @ 07:01  -  08-18-21 @ 22:04  --------------------------------------------------------  IN: 2438.9 mL / OUT: 1120 mL / NET: 1318.9 mL      Physical Exam:  	  	Gen: intubated   	no jvd   	Pulm: decrease bs  no rales or ronchi or wheezing  	CV: RRR, S1S2; no rub  	Abd: +BS, soft, nontender/nondistended  	UE: Warm, no cyanosis  no clubbing,  no edema  	LE: Warm, no cyanosis  no clubbing, no edema      LABS/STUDIES  --------------------------------------------------------------------------------              11.0   17.37 >-----------<  Clumped    [08-18-21 @ 00:27]              33.6     151  |  122  |  35  ----------------------------<  127      [08-18-21 @ 18:38]  4.0   |  17  |  1.53        Ca     8.8     [08-18-21 @ 18:38]      Mg     2.5     [08-18-21 @ 18:38]      Phos  2.9     [08-18-21 @ 18:38]    TPro  6.0  /  Alb  2.8  /  TBili  0.5  /  DBili  x   /  AST  16  /  ALT  15  /  AlkPhos  106  [08-18-21 @ 18:38]          Creatinine Trend:  SCr 1.53 [08-18 @ 18:38]  SCr 1.59 [08-18 @ 12:08]  SCr 1.68 [08-18 @ 06:22]  SCr 1.81 [08-18 @ 00:28]  SCr 1.81 [08-17 @ 18:01]              Urinalysis - [08-15-21 @ 13:52]      Color Yellow / Appearance Slightly Turbid / SG 1.050 / pH 5.5      Gluc Negative / Ketone Negative  / Bili Negative / Urobili Negative       Blood Moderate / Protein 30 mg/dL / Leuk Est Small / Nitrite Negative      RBC 9 / WBC 13 / Hyaline 106 / Gran  / Sq Epi  / Non Sq Epi 10 / Bacteria Negative      TSH 1.33      [08-13-21 @ 14:52]  Lipid: chol 169, TG 56, HDL 89, LDL --      [08-13-21 @ 13:21]

## 2021-08-18 NOTE — PROCEDURE NOTE - NSPROCDETAILS_GEN_ALL_CORE
location identified, draped/prepped, sterile technique used, needle inserted/introduced/positive blood return obtained via catheter/connected to a pressurized flush line/sutured in place/Seldinger technique/all materials/supplies accounted for at end of procedure
guidewire recovered/lumen(s) aspirated and flushed/sterile dressing applied/sterile technique, catheter placed/ultrasound guidance with use of sterile gel and probe cove
patient pre-oxygenated, tube inserted, placement confirmed

## 2021-08-18 NOTE — PROVIDER CONTACT NOTE (OTHER) - ASSESSMENT
Patient remains intubated and sedated but JOHNSON with withdrawal to pain. & Vitals per flowsheet, infusing propofol, cardene, 2% NS, in no distress, at this time. Pt rhythm on tele SR. Patient inspiratory pressures low, equal breath sounds, balloon inflated, patient suctioned via supraglotic device & endotracheally without relief

## 2021-08-18 NOTE — PROGRESS NOTE ADULT - SUBJECTIVE AND OBJECTIVE BOX
CRITICAL CARE ATTENDING - CTICU    MEDICATIONS  (STANDING):  chlorhexidine 0.12% Liquid 15 milliLiter(s) Oral Mucosa every 12 hours  heparin   Injectable 5000 Unit(s) SubCutaneous every 8 hours  levETIRAcetam  IVPB 1000 milliGRAM(s) IV Intermittent every 12 hours  niCARdipine Infusion 2 mG/Hr (10 mL/Hr) IV Continuous <Continuous>  pantoprazole  Injectable 40 milliGRAM(s) IV Push daily  piperacillin/tazobactam IVPB.. 3.375 Gram(s) IV Intermittent every 8 hours  propofol Infusion 20 MICROgram(s)/kG/Min (12.8 mL/Hr) IV Continuous <Continuous>  sodium chloride 2% . 1000 milliLiter(s) (50 mL/Hr) IV Continuous <Continuous>  veCURonium  Injectable 10 milliGRAM(s) IV Push once                                    11.0   17.37 )-----------( Clumped    ( 18 Aug 2021 00:27 )             33.6       08-18    147<H>  |  117<H>  |  38<H>  ----------------------------<  142<H>  3.9   |  17<L>  |  1.68<H>    Ca    8.9      18 Aug 2021 06:22  Phos  3.4     08-18  Mg     2.7     08-18    TPro  6.3  /  Alb  3.1<L>  /  TBili  0.6  /  DBili  x   /  AST  20  /  ALT  18  /  AlkPhos  97  08-18          Mode: AC/ CMV (Assist Control/ Continuous Mandatory Ventilation)  RR (machine): 12  TV (machine): 650  FiO2: 100  PEEP: 10  ITime: 0.9  MAP: 12  PIP: 24      Daily     Daily       08-17 @ 07:01  -  08-18 @ 07:00  --------------------------------------------------------  IN: 4778.5 mL / OUT: 1990 mL / NET: 2788.5 mL        Critically Ill patient  : [ ] preoperative ,   [ ] post operative  [x] non operative management     Requires :  [ x] Arterial Line   [ ] Central Line  [ ] PA catheter  [ ] IABP  [ ] ECMO  [ ] LVAD  [ x] Ventilator  [ ] pacemaker [ ] Impella.                      [x ] ABG's     [x ] Pulse Oxymetry Monitoring  Bedside evaluation , monitoring , treatment of hemodynamics , fluids , IVP/ IVCD meds.        Diagnosis:     Admitted Syncopal episode / hypertension     L  Intracranial Bleed     Type A Aortic Dissection     Multiple brain infarcts    respiratory failure     Ventilator Management:  [x ]AC-rest    [ ]CPAP-PS Wean    [ ]Trach Collar     [ ]Extubate    [ ] T-Piece  [ ]peep>5     Requires chest PT, pulmonary toilet, ambu bagging, suctioning to maintain SaO2,  patent airway and treat atelectasis.     Sedated with IVCD Propofol to maintain vent synchrony     Difficult weaning process - multiple organ system involvement in critically ill patient     Hemodynamic lability,  instability. Requires IVCD [ ] vasopressors [ ] inotropes  [ x] vasodilator  [ x]IVSS fluid  to maintain MAP, perfusion, C.I.     Obtunded mental status / posturing     Renal Failure - Acute Kidney Injury     Hypernatremia     Tolerates NG / NJ feeds at [ ] goal rate    [ ] trophic rate    [ ]       rate     L   Pneumonia               By signing my name below, I, Hannah Coronado, attest that this documentation has been prepared under the direction and in the presence of Gerry Page MD.   Electronically Signed: Shannon Buchanan 08-18-21 @ 07:24      Discussed with CT surgeon, Physician Assistant - Nurse Practitioner- Critical care medicine team.   Discussed at  AM / PM rounds.   Chart, labs , films reviewed.    Cumulative Critical Care Time Given Today:  CRITICAL CARE ATTENDING - CTICU    MEDICATIONS  (STANDING):  chlorhexidine 0.12% Liquid 15 milliLiter(s) Oral Mucosa every 12 hours  heparin   Injectable 5000 Unit(s) SubCutaneous every 8 hours  levETIRAcetam  IVPB 1000 milliGRAM(s) IV Intermittent every 12 hours  niCARdipine Infusion 2 mG/Hr (10 mL/Hr) IV Continuous <Continuous>  pantoprazole  Injectable 40 milliGRAM(s) IV Push daily  piperacillin/tazobactam IVPB.. 3.375 Gram(s) IV Intermittent every 8 hours  propofol Infusion 20 MICROgram(s)/kG/Min (12.8 mL/Hr) IV Continuous <Continuous>  sodium chloride 2% . 1000 milliLiter(s) (50 mL/Hr) IV Continuous <Continuous>  veCURonium  Injectable 10 milliGRAM(s) IV Push once                                    11.0   17.37 )-----------( Clumped    ( 18 Aug 2021 00:27 )             33.6       08-18    147<H>  |  117<H>  |  38<H>  ----------------------------<  142<H>  3.9   |  17<L>  |  1.68<H>    Ca    8.9      18 Aug 2021 06:22  Phos  3.4     08-18  Mg     2.7     08-18    TPro  6.3  /  Alb  3.1<L>  /  TBili  0.6  /  DBili  x   /  AST  20  /  ALT  18  /  AlkPhos  97  08-18          Mode: AC/ CMV (Assist Control/ Continuous Mandatory Ventilation)  RR (machine): 12  TV (machine): 650  FiO2: 100  PEEP: 10  ITime: 0.9  MAP: 12  PIP: 24      Daily     Daily       08-17 @ 07:01  -  08-18 @ 07:00  --------------------------------------------------------  IN: 4778.5 mL / OUT: 1990 mL / NET: 2788.5 mL        Critically Ill patient  : [ ] preoperative ,   [ ] post operative  [x] non operative management     Requires :  [ x] Arterial Line   [ ] Central Line  [ ] PA catheter  [ ] IABP  [ ] ECMO  [ ] LVAD  [ x] Ventilator  [ ] pacemaker [ ] Impella.                      [x ] ABG's     [x ] Pulse Oxymetry Monitoring  Bedside evaluation , monitoring , treatment of hemodynamics , fluids , IVP/ IVCD meds.        Diagnosis:     Admitted Syncopal episode / hypertension     L  Intracranial Bleed     Type A Aortic Dissection     Multiple brain infarcts    respiratory failure     Ventilator Management:  [x ]AC-rest    [ ]CPAP-PS Wean    [ ]Trach Collar     [ ]Extubate    [ ] T-Piece  [ ]peep>5     Requires chest PT, pulmonary toilet, ambu bagging, suctioning to maintain SaO2,  patent airway and treat atelectasis.     Sedated with IVCD Propofol to maintain vent synchrony     Difficult weaning process - multiple organ system involvement in critically ill patient     Hemodynamic lability,  instability. Requires IVCD [ ] vasopressors [ ] inotropes  [ x] vasodilator  [ x]IVSS fluid  to maintain MAP, perfusion, C.I.     Obtunded mental status / posturing     Renal Failure - Acute Kidney Injury     Hypernatremia     Tolerates NG / NJ feeds at [ ] goal rate    [ ] trophic rate    [ ]       rate     L   Pneumonia - MSSA     F/U - CT Head               By signing my name below, IHannah, attest that this documentation has been prepared under the direction and in the presence of Gerry Page MD.   Electronically Signed: Shannon Buchanan 08-18-21 @ 07:24    IGerry, personally performed the services described in this documentation. All medical record entries made by the scribe were at my direction and in my presence. I have reviewed the chart and agree that the record reflects my personal performance and is accurate and complete.   Gerry Page MD.       Discussed with CT surgeon, Physician Assistant - Nurse Practitioner- Critical care medicine team.   Discussed at  AM / PM rounds.   Chart, labs , films reviewed.    Cumulative Critical Care Time Given Today:  30 min

## 2021-08-18 NOTE — PROVIDER CONTACT NOTE (OTHER) - BACKGROUND
Stroke, left basal ganglia intraparenchymal hemorrhage c/b respiratory compromise, change in mental and status; +pseudomonas in sputum

## 2021-08-18 NOTE — PROCEDURE NOTE - ADDITIONAL PROCEDURE DETAILS
RIJ triple lumen central venous catheter placed under ultrasound guidance. Guidewire recovered. Patient tolerated procedure well. RIJ triple lumen central venous catheter placed under ultrasound guidance. Guidewire recovered. Patient tolerated procedure well. Post procedure CXR with catheter in SVC and no pneumothorax.

## 2021-08-18 NOTE — PROCEDURE NOTE - NSINDICATIONS_GEN_A_CORE
airway protection/critical patient
arterial puncture to obtain ABG's/cannulation purposes/critical patient
critical illness

## 2021-08-18 NOTE — EEG REPORT - NS EEG TEXT BOX
REPORT OF CONTINUOUS VIDEO EEG   [facility]: [address] [place] [address] , [address], [place] [address], Ph#: [phone] [facility]: [id number], [place] [address], Ph#: [phone] Office: [address] [place] [address], [place] [address] Ph#: [phone]  Patient Name: LUCY MACIAS Age and : 56y ([birth year]) MRN #: [id number] Location: [place] [address] Referring Physician: Luis Angel Savage  Start Time/Date: 08:00 on 21 End Time/Date: 08:00 on 21 Duration: 24 h 00 mins _____________________________________________________________ STUDY INFORMATION  EEG Recording Technique: The patient underwent continuous Video-EEG monitoring, using Telemetry System hardware on the XLTek Digital System. EEG and video data were stored on a computer hard drive with important events saved in digital archive files. The material was reviewed by a physician (electroencephalographer / epileptologist) on a daily basis. Godfrey and seizure detection algorithms were utilized and reviewed. An EEG Technician attended to the patient, and was available throughout daytime work hours.  The epilepsy center neurologist was available in person or on call 24-hours per day.  EEG Placement and Labeling of Electrodes: The EEG was performed utilizing 20 channel referential EEG connections (coronal over temporal over parasagittal montage) using all standard 10-20 electrode placements with EKG, with additional electrodes placed in the inferior temporal region using the modified 10-10 montage electrode placements for elective admissions, or if deemed necessary. Recording was at a sampling rate of 256 samples per second per channel. Time synchronized digital video recording was done simultaneously with EEG recording. A low light infrared camera was used for low light recording.   _____________________________________________________________ HISTORY  Patient is a 56y old  Male who presents with a chief complaint of Stroke, left basal ganglia intraparenchymal hemorrhage (16 Aug 2021 20:24)   PERTINENT MEDICATION: levETIRAcetam  IVPB 1000 milliGRAM(s) IV Intermittent every 12 hours propofol Infusion 20 MICROgram(s)/kG/Min (12.8 mL/Hr) IV Continuous <Continuous> veCURonium  Injectable 10 milliGRAM(s) IV Push once  _____________________________________________________________ STUDY INTERPRETATION  Findings: The background was continuous and reactive. No posterior dominant rhythm seen.  Background Slowing: Diffuse theta and polymorphic delta slowing, with rare brief 1-2 seconds of attenuation.  Focal Slowing:  Continuous theta/delta slowing in the left > right frontal regions (Max F3).  Sleep Background: Drowsiness and rudimentary stage II sleep transients were recorded.  Other Non-Epileptiform Findings: None were present.  Interictal Epileptiform Activity:  Abundant LPDs - 1-3 Hz, right frontal-central region (F4 max / Fz) Occasional independent left frontal sharp-wave discharges (F3 max) Intermittent runs of GPDs 0.5-1.5 Hz, at times with triphasic morphology.    Events: Clinical events: None recorded. Seizures: None recorded.  Activation Procedures:  Hyperventilation was not performed.   Photic stimulation was not performed.   Artifacts: Intermittent myogenic and movement artifacts were noted.  ECG: The heart rate on single channel ECG was predominantly between 60-80 BPM, irregular at times  _____________________________________________________________ EEG SUMMARY/CLASSIFICATION  Abnormal EEG in a comatose patient - LPDs - 1-3 Hz, right frontal-central region (F4 max / Fz) - Occasional independent left frontal sharp wave discharges (F3 max) - Intermittent runs of GPDs 0.5-1.5 Hz, at times with triphasic morphology.   - Moderate-severe generalized slowing. - Irregular heart rhythm noted at times. _____________________________________________________________ EEG IMPRESSION/CLINICAL CORRELATE  Abnormal EEG study.  - Potential independent epileptogenic foci in the bilateral frontal, central regions (right greater than left) superimposed over diffuse cortical hyperexcitability with GPDs, at times with triphasic morphology suggestive of severe metabolic encephalopathy but can be considered epileptogenic in the correct clinical context.  - Structural or functional abnormality in left > right frontal regions - Moderate-severe nonspecific diffuse or multifocal cerebral dysfunction.   *** PRELIMINARY REPORT - PENDING EPILEPSY ATTENDING REVIEW *** _____________________________________________________________  Fan Spencer MD, BRAIN Fellow, [facility]    [facility] EEG Reading Room Ph#: [phone] Epilepsy Answering Service after 5PM and before 8:30AM: Ph#: [phone].    REPORT OF CONTINUOUS VIDEO EEG   [facility]: [address] [place] [address] , [address], [place] [address], Ph#: [phone] [facility]: [id number], [place] [address], Ph#: [phone] Office: [address] [place] [address], [place] [address] Ph#: [phone]  Patient Name: LUCY MACIAS Age and : 56y (67) MRN #: [id number] Location: [place] [address] Referring Physician: Luis Angel Savage  Start Time/Date: 08:00 on 21 End Time/Date: 08:00 on 21 Duration: 24 h 00 mins _____________________________________________________________ STUDY INFORMATION  EEG Recording Technique: The patient underwent continuous Video-EEG monitoring, using Telemetry System hardware on the XLTek Digital System. EEG and video data were stored on a computer hard drive with important events saved in digital archive files. The material was reviewed by a physician (electroencephalographer / epileptologist) on a daily basis. Godfrey and seizure detection algorithms were utilized and reviewed. An EEG Technician attended to the patient, and was available throughout daytime work hours.  The epilepsy center neurologist was available in person or on call 24-hours per day.  EEG Placement and Labeling of Electrodes: The EEG was performed utilizing 20 channel referential EEG connections (coronal over temporal over parasagittal montage) using all standard 10-20 electrode placements with EKG, with additional electrodes placed in the inferior temporal region using the modified 10-10 montage electrode placements for elective admissions, or if deemed necessary. Recording was at a sampling rate of 256 samples per second per channel. Time synchronized digital video recording was done simultaneously with EEG recording. A low light infrared camera was used for low light recording.   _____________________________________________________________ HISTORY  Patient is a 56y old  Male who presents with a chief complaint of Stroke, left basal ganglia intraparenchymal hemorrhage (16 Aug 2021 20:24)   PERTINENT MEDICATION: levETIRAcetam  IVPB 1000 milliGRAM(s) IV Intermittent every 12 hours propofol Infusion 20 MICROgram(s)/kG/Min (12.8 mL/Hr) IV Continuous <Continuous> veCURonium  Injectable 10 milliGRAM(s) IV Push once  _____________________________________________________________ STUDY INTERPRETATION  Findings: The background was continuous and reactive. No posterior dominant rhythm seen.  Background Slowing: Diffuse theta and polymorphic delta slowing, with rare brief 1-2 seconds of attenuation.  Focal Slowing:  Continuous theta/delta slowing in the left > right frontal regions (Max F3).  Sleep Background: Drowsiness and rudimentary stage II sleep transients were recorded.  Other Non-Epileptiform Findings: None were present.  Interictal Epileptiform Activity:  Abundant LPDs - 1-3 Hz, right frontal-central region (F4 max / Fz) Occasional independent left frontal sharp-wave discharges (F3 max) Intermittent runs of GPDs 0.5-1.5 Hz, at times with triphasic morphology.    Events: Clinical events: None recorded. Seizures: None recorded.  Activation Procedures:  Hyperventilation was not performed.   Photic stimulation was not performed.   Artifacts: Intermittent myogenic and movement artifacts were noted.  ECG: The heart rate on single channel ECG was predominantly between 60-80 BPM, irregular at times  _____________________________________________________________ EEG SUMMARY/CLASSIFICATION  Abnormal EEG in a comatose patient - LPDs - 1-3 Hz, right frontal-central region (F4 max / Fz) - Occasional independent left frontal sharp wave discharges (F3 max) - Intermittent runs of GPDs 0.5-1.5 Hz, at times with triphasic morphology.   - Moderate-severe generalized slowing. - Irregular heart rhythm noted at times. _____________________________________________________________ EEG IMPRESSION/CLINICAL CORRELATE  Abnormal EEG study.  - Potential independent epileptogenic foci in the bilateral frontal, central regions (right greater than left) superimposed over diffuse cortical hyperexcitability with GPDs, at times with triphasic morphology suggestive of severe metabolic encephalopathy but can be considered epileptogenic in the correct clinical context.  - Structural or functional abnormality in left > right frontal regions - Moderate-severe nonspecific diffuse or multifocal cerebral dysfunction.   _____________________________________________________________  Fan Spencer MD, BRAIN Fellow, [facility]  Todd Irving MD EEG/Epilepsy Attending   [facility] EEG Reading Room Ph#: [phone] Epilepsy Answering Service after 5PM and before 8:30AM: Ph#: [phone].

## 2021-08-18 NOTE — PROCEDURE NOTE - NSICDXPROCEDURE_GEN_ALL_CORE_FT
PROCEDURES:  Ultrasound guidance for placement of non-tunneled central venous catheter 18-Aug-2021 11:39:24  Franny Camacho

## 2021-08-19 NOTE — PROGRESS NOTE ADULT - SUBJECTIVE AND OBJECTIVE BOX
[facility]   [phone]  RENAL FOLLOW UP NOTE  --------------------------------------------------------------------------------  Chief Complaint:    24 hour events/subjective:    seen earlier.   intubated on cardene drip     PAST HISTORY  --------------------------------------------------------------------------------  No significant changes to PMH, PSH, FHx, SHx, unless otherwise noted    ALLERGIES & MEDICATIONS  --------------------------------------------------------------------------------  Allergies    No Known Allergies    Intolerances      Standing Inpatient Medications  chlorhexidine 0.12% Liquid 15 milliLiter(s) Oral Mucosa every 12 hours  chlorhexidine 2% Cloths 1 Application(s) Topical <User Schedule>  heparin   Injectable 5000 Unit(s) SubCutaneous every 8 hours  labetalol 200 milliGRAM(s) Oral every 8 hours  levETIRAcetam  IVPB 1000 milliGRAM(s) IV Intermittent every 12 hours  niCARdipine Infusion 2 mG/Hr IV Continuous <Continuous>  pantoprazole  Injectable 40 milliGRAM(s) IV Push daily  piperacillin/tazobactam IVPB.. 3.375 Gram(s) IV Intermittent every 8 hours  propofol Infusion 20 MICROgram(s)/kG/Min IV Continuous <Continuous>    PRN Inpatient Medications  acetaminophen    Suspension .. 650 milliGRAM(s) Enteral Tube every 6 hours PRN      REVIEW OF SYSTEMS  --------------------------------------------------------------------------------      VITALS/PHYSICAL EXAM  --------------------------------------------------------------------------------  T(C): 37.3 (08-19-21 @ 16:00), Max: 38.4 (08-19-21 @ 00:00)  HR: 58 (08-19-21 @ 19:00) (56 - 82)  BP: --  RR: 16 (08-19-21 @ 19:00) (15 - 29)  SpO2: 100% (08-19-21 @ 19:00) (93% - 100%)  Wt(kg): --        08-18-21 @ 07:01  -  08-19-21 @ 07:00  --------------------------------------------------------  IN: 3981.4 mL / OUT: 1895 mL / NET: 2086.4 mL    08-19-21 @ 07:01  -  08-19-21 @ 19:47  --------------------------------------------------------  IN: 1556.2 mL / OUT: 1190 mL / NET: 366.2 mL      Physical Exam:  	  	Gen: intubated   	no jvd   	Pulm: decrease bs  no rales or ronchi or wheezing  	CV: RRR, S1S2; no rub  	Abd: +BS, soft, nontender/nondistended  	UE: Warm, no cyanosis  no clubbing,  no edema  	LE: Warm, no cyanosis  no clubbing,  + edema      LABS/STUDIES  --------------------------------------------------------------------------------              9.8    13.75 >-----------<  239      [08-19-21 @ 00:39]              31.5     152  |  124  |  30  ----------------------------<  103      [08-19-21 @ 17:56]  4.1   |  18  |  1.60        Ca     9.1     [08-19-21 @ 17:56]      Mg     2.6     [08-19-21 @ 00:39]      Phos  3.2     [08-19-21 @ 00:39]    TPro  5.9  /  Alb  2.7  /  TBili  0.4  /  DBili  x   /  AST  16  /  ALT  15  /  AlkPhos  106  [08-19-21 @ 17:56]          Creatinine Trend:  SCr 1.60 [08-19 @ 17:56]  SCr 1.73 [08-19 @ 08:33]  SCr 1.75 [08-19 @ 00:39]  SCr 1.53 [08-18 @ 18:38]  SCr 1.59 [08-18 @ 12:08]              Urinalysis - [08-15-21 @ 13:52]      Color Yellow / Appearance Slightly Turbid / SG 1.050 / pH 5.5      Gluc Negative / Ketone Negative  / Bili Negative / Urobili Negative       Blood Moderate / Protein 30 mg/dL / Leuk Est Small / Nitrite Negative      RBC 9 / WBC 13 / Hyaline 106 / Gran  / Sq Epi  / Non Sq Epi 10 / Bacteria Negative      TSH 1.33      [08-13-21 @ 14:52]  Lipid: chol 169, TG 56, HDL 89, LDL --      [08-13-21 @ 13:21]

## 2021-08-19 NOTE — PROGRESS NOTE ADULT - SUBJECTIVE AND OBJECTIVE BOX
CRITICAL CARE ATTENDING - CTICU    MEDICATIONS  (STANDING):  chlorhexidine 0.12% Liquid 15 milliLiter(s) Oral Mucosa every 12 hours  heparin   Injectable 5000 Unit(s) SubCutaneous every 8 hours  levETIRAcetam  IVPB 1000 milliGRAM(s) IV Intermittent every 12 hours  niCARdipine Infusion 2 mG/Hr (10 mL/Hr) IV Continuous <Continuous>  pantoprazole  Injectable 40 milliGRAM(s) IV Push daily  piperacillin/tazobactam IVPB.. 3.375 Gram(s) IV Intermittent every 8 hours  propofol Infusion 20 MICROgram(s)/kG/Min (12.8 mL/Hr) IV Continuous <Continuous>                                    9.8    13.75 )-----------( 239      ( 19 Aug 2021 00:39 )             31.5           153<H>  |  124<H>  |  36<H>  ----------------------------<  124<H>  3.9   |  17<L>  |  1.75<H>    Ca    8.9      19 Aug 2021 00:39  Phos  3.2       Mg     2.6         TPro  5.8<L>  /  Alb  2.8<L>  /  TBili  0.4  /  DBili  x   /  AST  13  /  ALT  14  /  AlkPhos  101            Mode: AC/ CMV (Assist Control/ Continuous Mandatory Ventilation)  RR (machine): 12  TV (machine): 650  FiO2: 50  PEEP: 8  ITime: 1  MAP: 12  PIP: 25      Daily     Daily Weight in k.6 (19 Aug 2021 00:00)       @ 07:  -   @ 07:00  --------------------------------------------------------  IN: 4778.5 mL / OUT: 1990 mL / NET: 2788.5 mL     @ 07:  -   @ 06:47  --------------------------------------------------------  IN: 3865.7 mL / OUT: 1785 mL / NET: 2080.7 mL        Critically Ill patient  : [ ] preoperative ,   [ ] post operative [x] non operative management     Requires :  [x] Arterial Line   [x] Central Line  [ ] PA catheter  [ ] IABP  [ ] ECMO  [ ] LVAD  [x] Ventilator  [ ] pacemaker [ ] Impella.                      [x] ABG's     [x] Pulse Oxymetry Monitoring  Bedside evaluation , monitoring , treatment of hemodynamics , fluids , IVP/ IVCD meds.        Diagnosis:     Admitted Syncopal episode / hypertension     L  Intracranial Bleed     Type A Aortic Dissection     Multiple brain infarcts    respiratory failure     Ventilator Management:  [x ]AC-rest    [ ]CPAP-PS Wean    [ ]Trach Collar     [ ]Extubate    [ ] T-Piece  [ ]peep>5     Requires chest PT, pulmonary toilet, ambu bagging, suctioning to maintain SaO2,  patent airway and treat atelectasis.     Sedated with IVCD Propofol to maintain vent synchrony     Difficult weaning process - multiple organ system involvement in critically ill patient     Hemodynamic lability,  instability. Requires IVCD [ ] vasopressors [ ] inotropes  [ x] vasodilator  [ x]IVSS fluid  to maintain MAP, perfusion, C.I.     Obtunded mental status / posturing     Renal Failure - Acute Kidney Injury     Hypernatremia     Tolerates NG / NJ feeds at [x] goal rate    [ ] trophic rate    [ ]       rate     L   Pneumonia - MSSA     F/U - CT Head         I, Gerry Page, personally performed the services described in this documentation. All medical record entries made by the scribe were at my direction and in my presence. I have reviewed the chart and agree that the record reflects my personal performance and is accurate and complete.   Gerry Page MD.       By signing my name below, I, Harika Mejia, attest that this documentation has been prepared under the direction and in the presence of Gerry Page MD.   Electronically Signed: Harika Mejia Scribe 21 @ 06:47        Discussed with CT surgeon, Physician Assistant - Nurse Practitioner- Critical care medicine team.   Dicussed at  AM / PM rounds.   Chart, labs , films reviewed.    Cumulative Critical Care Time Given Today:  CRITICAL CARE ATTENDING - CTICU    MEDICATIONS  (STANDING):  chlorhexidine 0.12% Liquid 15 milliLiter(s) Oral Mucosa every 12 hours  heparin   Injectable 5000 Unit(s) SubCutaneous every 8 hours  levETIRAcetam  IVPB 1000 milliGRAM(s) IV Intermittent every 12 hours  niCARdipine Infusion 2 mG/Hr (10 mL/Hr) IV Continuous <Continuous>  pantoprazole  Injectable 40 milliGRAM(s) IV Push daily  piperacillin/tazobactam IVPB.. 3.375 Gram(s) IV Intermittent every 8 hours  propofol Infusion 20 MICROgram(s)/kG/Min (12.8 mL/Hr) IV Continuous <Continuous>                                    9.8    13.75 )-----------( 239      ( 19 Aug 2021 00:39 )             31.5           153<H>  |  124<H>  |  36<H>  ----------------------------<  124<H>  3.9   |  17<L>  |  1.75<H>    Ca    8.9      19 Aug 2021 00:39  Phos  3.2       Mg     2.6         TPro  5.8<L>  /  Alb  2.8<L>  /  TBili  0.4  /  DBili  x   /  AST  13  /  ALT  14  /  AlkPhos  101            Mode: AC/ CMV (Assist Control/ Continuous Mandatory Ventilation)  RR (machine): 12  TV (machine): 650  FiO2: 50  PEEP: 8  ITime: 1  MAP: 12  PIP: 25      Daily     Daily Weight in k.6 (19 Aug 2021 00:00)       @ 07:  -   @ 07:00  --------------------------------------------------------  IN: 4778.5 mL / OUT: 1990 mL / NET: 2788.5 mL     @ 07:  -   @ 06:47  --------------------------------------------------------  IN: 3865.7 mL / OUT: 1785 mL / NET: 2080.7 mL        Critically Ill patient  : [ ] preoperative ,   [ ] post operative     [x] non operative management     Requires :  [x] Arterial Line   [x] Central Line  [ ] PA catheter  [ ] IABP  [ ] ECMO  [ ] LVAD  [x] Ventilator  [ ] pacemaker [ ] Impella.                      [x] ABG's     [x] Pulse Oxymetry Monitoring  Bedside evaluation , monitoring , treatment of hemodynamics , fluids , IVP/ IVCD meds.        Diagnosis:     Admitted Syncopal episode / hypertension     L  Intracranial Bleed     Type A Aortic Dissection     Multiple brain infarcts    respiratory failure     Ventilator Management:  [x ]AC-rest    [ ]CPAP-PS Wean    [ ]Trach Collar     [ ]Extubate    [ ] T-Piece  [ ]peep>5     Requires chest PT, pulmonary toilet, ambu bagging, suctioning to maintain SaO2,  patent airway and treat atelectasis.     Sedated with IVCD Propofol to maintain vent synchrony     Difficult weaning process - multiple organ system involvement in critically ill patient     Hemodynamic lability,  instability. Requires IVCD [ ] vasopressors [ ] inotropes  [ x] vasodilator  [ x]IVSS fluid  to maintain MAP, perfusion, C.I.     Obtunded mental status / posturing / ? seizure disorder ?    Renal Failure - Acute Kidney Injury     Hypernatremia     Tolerates NG / NJ feeds at [x] goal rate    [ ] trophic rate    [ ]       rate     L   Pneumonia - MSSA     F/U - CT Head     Sedation vacation / neurological evaluation         I, Gerry Page, personally performed the services described in this documentation. All medical record entries made by the scribe were at my direction and in my presence. I have reviewed the chart and agree that the record reflects my personal performance and is accurate and complete.   Gerry Page MD.       By signing my name below, I, Harika Mejia, attest that this documentation has been prepared under the direction and in the presence of Gerry Page MD.   Electronically Signed: Harika Mejia Scribe 21 @ 06:47        Discussed with CT surgeon, Physician Assistant - Nurse Practitioner- Critical care medicine team.   Dicussed at  AM / PM rounds.   Chart, labs , films reviewed.    Cumulative Critical Care Time Given Today:  30 min

## 2021-08-19 NOTE — PROCEDURE NOTE - ADDITIONAL PROCEDURE DETAILS
Pre-Bronchoscopy Tuberculosis Risk Screening Tool  To reduce the risk for airborne transmission of Mycobacterium tuberculosis, this assessment form must be completed prior to bronchoscopy procedures being performed outside of a negative pressure environment.    Procedure Date:  08-19-21 @ 16:07  Health Care Provider Name: kierra Soheila  Form Completed By: Kierra Parnell  Reason for the Bronchoscopy: Secretions/atelectasis  Date of Procedure:  08-19-21 @ 16:07  Planned Location for the Procedure: X Intensive Care Unit       Risk Assessment  I. I have personally evaluated this patient for Mycobacterium tuberculosis and I determined the following risk:       X Low risk or TB     [ ] Significant risk for TB    II. Additional Findings: None    III. Based on the Determined Risk for TB the following Action(s) are Suggested:  1. If there are no risk factors for TB infection proceed with the procedure.  2. If there is low risk or significant risk for TB infection the following recommendations should be followed:            a. Perform the procedure in a negative pressure room, with N95 respirator.            b. If not feasible to move the patient or defer the procedure:                    i. Use a single-bedded room low traffic area to perform the bronchoscopy procedure.                   ii. Place a portable high-efficiency particulate air (HEPA) filter in the space prior to starting the procedure and keep closed.                       Refer to the INF.1132 titled “Tuberculosis Control Strategy Plan” for additional information.                  iii. All Health Care Providers within the procedure room shall wear an N95 respirator.            c. Documentation of the tuberculosis risk assessment should be included within the patient’s medical record.            Indication: Atelectasis        Preop medication:    Xray Findings:    Findings:  Bronchoscope inserted through ETT. ETT noted to be in good position. Airway evaluation revealed Sharp Nneka. KOMAL and LLL evaluation revealed small  amount of thick secretions lavaged with normal saline & suctioned multiple times RUL, RML, RLL revealed  small amount of secretions.. Bronchoscope then withdrawn from ET tube.. no bleeding noted    Specimens: Bal send

## 2021-08-19 NOTE — PROCEDURE NOTE - NSINFORMCONSENT_GEN_A_CORE
This was an emergent procedure.
phone consent- sister/Benefits, risks, and possible complications of procedure explained to patient/caregiver who verbalized understanding and gave written consent.
This was an emergent procedure.
Benefits, risks, and possible complications of procedure explained to patient/caregiver who verbalized understanding and gave verbal consent.

## 2021-08-19 NOTE — PROCEDURE NOTE - NSPOSTCAREGUIDE_GEN_A_CORE
Care for catheter as per unit/ICU protocols
Care for catheter as per unit/ICU protocols
Verbal/written post procedure instructions were given to patient/caregiver/Care for catheter as per unit/ICU protocols

## 2021-08-19 NOTE — PROGRESS NOTE ADULT - ASSESSMENT
57yo man w/ no known PMH, no medications, presents to ED as transfer from [facility] with left basal ganglia intraparenchymal hemorrhage and incidental finding of Type A dissection.  Patient's brother at bedside and provides history. 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 At [place], CTH noncon was done and demonstrates left basal ganglia hemorrhage. Patient subsequently transferred to [facility] ED, repeat imaging showed possible Type A aortic dissection. Cardiothoracic surgery consulted, CTA CAP showed Type A dissection from distal ascending aorta to distal arch. Pt in [facility] ED started on  nicardipine gtt.  pt also noticed with cr 2. per brother pt has not seen a primary doc.  pt required intubation for airway protection       1- MILDRED   2- type A aortic dissection   3- ICH   4- HTN     mildred in setting of type A aortic dissection with iv contrast with underlying likely ckd and seems to be ranging towards baseline    cardene drip for bp control labetalol 200 mg q8   hypernatremia off 2% nacl drip  uo is improving and so is cr as well.  vent support   d/w CTU team

## 2021-08-19 NOTE — EEG REPORT - NS EEG TEXT BOX
REPORT OF CONTINUOUS VIDEO EEG   [facility]: [address] [place] [address] , [address], [place] [address], Ph#: [phone] [facility]: [address], [place] [address], Ph#: [phone] Office: [address] [place] [address], [place] [address] Ph#: [phone]  Patient Name: LUCY MACIAS Age and : 56y ([birth year]) MRN #: [id number] Location: [place] [address] Referring Physician: Luis Angel Savage  Start Time/Date: 08:00 on 21 End Time/Date: 08:00 on 21 Duration: 24 h 00 mins _____________________________________________________________ STUDY INFORMATION  EEG Recording Technique: The patient underwent continuous Video-EEG monitoring, using Telemetry System hardware on the XLTek Digital System. EEG and video data were stored on a computer hard drive with important events saved in digital archive files. The material was reviewed by a physician (electroencephalographer / epileptologist) on a daily basis. Godfrey and seizure detection algorithms were utilized and reviewed. An EEG Technician attended to the patient, and was available throughout daytime work hours.  The epilepsy center neurologist was available in person or on call 24-hours per day.  EEG Placement and Labeling of Electrodes: The EEG was performed utilizing 20 channel referential EEG connections (coronal over temporal over parasagittal montage) using all standard 10-20 electrode placements with EKG, with additional electrodes placed in the inferior temporal region using the modified 10-10 montage electrode placements for elective admissions, or if deemed necessary. Recording was at a sampling rate of 256 samples per second per channel. Time synchronized digital video recording was done simultaneously with EEG recording. A low light infrared camera was used for low light recording.   _____________________________________________________________ HISTORY  Patient is a 56y old  Male who presents with a chief complaint of Stroke, left basal ganglia intraparenchymal hemorrhage (16 Aug 2021 20:24)   PERTINENT MEDICATION: levETIRAcetam  IVPB 1000 milliGRAM(s) IV Intermittent every 12 hours propofol Infusion 20 MICROgram(s)/kG/Min (12.8 mL/Hr) IV Continuous <Continuous> veCURonium  Injectable 10 milliGRAM(s) IV Push once  _____________________________________________________________ STUDY INTERPRETATION  Findings: The background was minimally reactive, intermittently in burst-suppression with asynchronous periods of discontinuity lasting 2-4 seconds, followed by burst activity superimposed on diffuse theta and polymorphic delta slowing. No posterior dominant rhythm seen.  Focal Slowing:  Continuous theta/delta slowing in the left > right frontal regions (Max F3).  Sleep Background: Drowsiness and rudimentary stage II sleep transients were recorded.  Other Non-Epileptiform Findings: None were present.  Interictal Epileptiform Activity:  Frequent LPDs - 1-3 Hz, right frontal-central region (F4 max / Fz) Occasional runs of GPDs 0.5-1.5 Hz, at times with triphasic morphology.    Events: Clinical events: None recorded. Seizures: None recorded.  Activation Procedures:  Hyperventilation was not performed.   Photic stimulation was not performed.   Artifacts: Intermittent myogenic and movement artifacts were noted.  ECG: The heart rate on single channel ECG was predominantly between 60-80 BPM, irregular at times  _____________________________________________________________ EEG SUMMARY/CLASSIFICATION  Abnormal EEG in a comatose patient - LPDs - 1-3 Hz, right frontal-central region (F4 max / Fz) - Previously seen left frontal sharp wave discharges are not noted in this record. - Occasional runs of GPDs 0.5-1.5 Hz, at times with triphasic morphology.   - Moderate-severe generalized slowing, at times in burst suppression pattern. - Irregular heart rhythm noted at times. _____________________________________________________________ EEG IMPRESSION/CLINICAL CORRELATE  Abnormal EEG study.  - Potential independent epileptogenic foci in the right frontal, central regions superimposed over diffuse cortical hyperexcitability with GPDs, at times with triphasic morphology suggestive of severe metabolic encephalopathy but can be considered epileptogenic in the correct clinical context.  - Structural or functional abnormality in left > right frontal regions - Moderate-severe nonspecific diffuse or multifocal cerebral dysfunction.   *** PRELIMINARY REPORT - PENDING EPILEPSY ATTENDING REVIEW *** _____________________________________________________________  Fan Spencer MD, BRAIN Fellow, [facility]   [facility] EEG Reading Room Ph#: [phone] Epilepsy Answering Service after 5PM and before 8:30AM: Ph#: [phone].    REPORT OF CONTINUOUS VIDEO EEG   [facility]: [address] [place] [address] , [address], [place] [address], Ph#: [phone] [facility]: [address], [place] [address], Ph#: [phone] Office: [address] [place] [address], [place] [address] Ph#: [phone]  Patient Name: LUCY MACIAS Age and : 56y (67) MRN #: [id number] Location: [place] [address] Referring Physician: Luis Angel Savage  Start Time/Date: 08:00 on 21 End Time/Date: 08:00 on 21 Duration: 24 h 00 mins _____________________________________________________________ STUDY INFORMATION  EEG Recording Technique: The patient underwent continuous Video-EEG monitoring, using Telemetry System hardware on the XLTek Digital System. EEG and video data were stored on a computer hard drive with important events saved in digital archive files. The material was reviewed by a physician (electroencephalographer / epileptologist) on a daily basis. Godfrey and seizure detection algorithms were utilized and reviewed. An EEG Technician attended to the patient, and was available throughout daytime work hours.  The epilepsy center neurologist was available in person or on call 24-hours per day.  EEG Placement and Labeling of Electrodes: The EEG was performed utilizing 20 channel referential EEG connections (coronal over temporal over parasagittal montage) using all standard 10-20 electrode placements with EKG, with additional electrodes placed in the inferior temporal region using the modified 10-10 montage electrode placements for elective admissions, or if deemed necessary. Recording was at a sampling rate of 256 samples per second per channel. Time synchronized digital video recording was done simultaneously with EEG recording. A low light infrared camera was used for low light recording.   _____________________________________________________________ HISTORY  Patient is a 56y old  Male who presents with a chief complaint of Stroke, left basal ganglia intraparenchymal hemorrhage (16 Aug 2021 20:24)   PERTINENT MEDICATION: levETIRAcetam  IVPB 1000 milliGRAM(s) IV Intermittent every 12 hours propofol Infusion 20 MICROgram(s)/kG/Min (12.8 mL/Hr) IV Continuous <Continuous> veCURonium  Injectable 10 milliGRAM(s) IV Push once  _____________________________________________________________ STUDY INTERPRETATION  Findings: The background was minimally reactive, intermittently in burst-suppression with asynchronous periods of discontinuity lasting 2-4 seconds, followed by burst activity superimposed on diffuse theta and polymorphic delta slowing. No posterior dominant rhythm seen.  Focal Slowing:  Continuous theta/delta slowing in the left > right frontal regions (Max F3).  Sleep Background: Drowsiness and rudimentary stage II sleep transients were recorded.  Other Non-Epileptiform Findings: None were present.  Interictal Epileptiform Activity:  Frequent LPDs - 1-3 Hz, right frontal-central region (F4 max / Fz) Occasional runs of GPDs 0.5-1.5 Hz, at times with triphasic morphology.    Events: Clinical events: None recorded. Seizures: None recorded.  Activation Procedures:  Hyperventilation was not performed.   Photic stimulation was not performed.   Artifacts: Intermittent myogenic and movement artifacts were noted.  ECG: The heart rate on single channel ECG was predominantly between 60-80 BPM, irregular at times  _____________________________________________________________ EEG SUMMARY/CLASSIFICATION  Abnormal EEG in a comatose patient - LPDs - 1-3 Hz, right frontal-central region (F4 max / Fz) - Previously seen left frontal sharp wave discharges are not noted in this record. - Occasional runs of GPDs 0.5-1.5 Hz, at times with triphasic morphology.   - Moderate-severe generalized slowing, at times in burst suppression pattern. - Irregular heart rhythm noted at times. _____________________________________________________________ EEG IMPRESSION/CLINICAL CORRELATE  Abnormal EEG study.  - Potential independent epileptogenic foci in the right frontal, central regions superimposed over diffuse cortical hyperexcitability with GPDs, at times with triphasic morphology suggestive of severe metabolic encephalopathy but can be considered epileptogenic in the correct clinical context.  - Structural or functional abnormality in left > right frontal regions - Moderate-severe nonspecific diffuse or multifocal cerebral dysfunction.  - No seizure seen x 3 days  *** PRELIMINARY REPORT - PENDING EPILEPSY ATTENDING REVIEW *** _____________________________________________________________  Fan Spencer MD, BRAIN Fellow, [facility]   [facility] EEG Reading Room Ph#: [phone] Epilepsy Answering Service after 5PM and before 8:30AM: Ph#: [phone].    REPORT OF CONTINUOUS VIDEO EEG   [facility]: [address] [place] [address] , [address], [place] [address], Ph#: [phone] [facility]: [address], [place] [address], Ph#: [phone] Office: [address] [place] [address], [place] [address] Ph#: [phone]  Patient Name: LUCY MACIAS Age and : 56y (67) MRN #: [id number] Location: [place] [address] Referring Physician: Luis Angel Savage  Start Time/Date: 08:00 on 21 End Time/Date: 08:00 on 21 Duration: 24 h 00 mins _____________________________________________________________ STUDY INFORMATION  EEG Recording Technique: The patient underwent continuous Video-EEG monitoring, using Telemetry System hardware on the XLTek Digital System. EEG and video data were stored on a computer hard drive with important events saved in digital archive files. The material was reviewed by a physician (electroencephalographer / epileptologist) on a daily basis. Godfrey and seizure detection algorithms were utilized and reviewed. An EEG Technician attended to the patient, and was available throughout daytime work hours.  The epilepsy center neurologist was available in person or on call 24-hours per day.  EEG Placement and Labeling of Electrodes: The EEG was performed utilizing 20 channel referential EEG connections (coronal over temporal over parasagittal montage) using all standard 10-20 electrode placements with EKG, with additional electrodes placed in the inferior temporal region using the modified 10-10 montage electrode placements for elective admissions, or if deemed necessary. Recording was at a sampling rate of 256 samples per second per channel. Time synchronized digital video recording was done simultaneously with EEG recording. A low light infrared camera was used for low light recording.   _____________________________________________________________ HISTORY  Patient is a 56y old  Male who presents with a chief complaint of Stroke, left basal ganglia intraparenchymal hemorrhage (16 Aug 2021 20:24)   PERTINENT MEDICATION: levETIRAcetam  IVPB 1000 milliGRAM(s) IV Intermittent every 12 hours propofol Infusion 20 MICROgram(s)/kG/Min (12.8 mL/Hr) IV Continuous <Continuous> veCURonium  Injectable 10 milliGRAM(s) IV Push once  _____________________________________________________________ STUDY INTERPRETATION  Findings: The background was minimally reactive, intermittently in burst-suppression with asynchronous periods of discontinuity lasting 2-4 seconds, followed by burst activity superimposed on diffuse theta and polymorphic delta slowing. No posterior dominant rhythm seen.  Focal Slowing:  Continuous theta/delta slowing in the left > right frontal regions (Max F3).  Sleep Background: Drowsiness and rudimentary stage II sleep transients were recorded.  Other Non-Epileptiform Findings: None were present.  Interictal Epileptiform Activity:  Frequent LPDs - 1-3 Hz, right frontal-central region (F4 max / Fz) Occasional runs of GPDs 0.5-1.5 Hz, at times with triphasic morphology.    Events: Clinical events: None recorded. Seizures: None recorded.  Activation Procedures:  Hyperventilation was not performed.   Photic stimulation was not performed.   Artifacts: Intermittent myogenic and movement artifacts were noted.  ECG: The heart rate on single channel ECG was predominantly between 60-80 BPM, irregular at times  _____________________________________________________________ EEG SUMMARY/CLASSIFICATION  Abnormal EEG in a comatose patient - LPDs - 1-3 Hz, right frontal-central region (F4 max / Fz) - Previously seen left frontal sharp wave discharges are not noted in this record. - Occasional runs of GPDs 0.5-1.5 Hz, at times with triphasic morphology.   - Moderate-severe generalized slowing, at times in burst suppression pattern. - Irregular heart rhythm noted at times. _____________________________________________________________ EEG IMPRESSION/CLINICAL CORRELATE  Abnormal EEG study.  - Potential independent epileptogenic foci in the right frontal, central regions superimposed over diffuse cortical hyperexcitability with GPDs, at times with triphasic morphology suggestive of severe metabolic encephalopathy but can be considered epileptogenic in the correct clinical context.  - Structural or functional abnormality in left > right frontal regions - Moderate-severe nonspecific diffuse or multifocal cerebral dysfunction.  - No seizure seen x 3 days  In absence of additional clinical concerns, recommend consideration for discontinuation of current EEG study with reconnection in future if clinically warranted.  *** PRELIMINARY REPORT - PENDING EPILEPSY ATTENDING REVIEW *** _____________________________________________________________  Fan Spencer MD, BRAIN Fellow, [facility]   [facility] EEG Reading Room Ph#: [phone] Epilepsy Answering Service after 5PM and before 8:30AM: Ph#: [phone].    REPORT OF CONTINUOUS VIDEO EEG   [facility]: [address] [place] [address] , [address], [place] [address], Ph#: [phone] [facility]: [address], [place] [address], Ph#: [phone] Office: [address] [place] [address], [place] [address] Ph#: [phone]  Patient Name: LUCY MACIAS Age and : 56y (67) MRN #: [id number] Location: [place] [address] Referring Physician: Luis Angel Savage  Start Time/Date: 08:00 on 21 End Time/Date: 08:00 on 21 Duration: 24 h 00 mins _____________________________________________________________ STUDY INFORMATION  EEG Recording Technique: The patient underwent continuous Video-EEG monitoring, using Telemetry System hardware on the XLTek Digital System. EEG and video data were stored on a computer hard drive with important events saved in digital archive files. The material was reviewed by a physician (electroencephalographer / epileptologist) on a daily basis. Godfrey and seizure detection algorithms were utilized and reviewed. An EEG Technician attended to the patient, and was available throughout daytime work hours.  The epilepsy center neurologist was available in person or on call 24-hours per day.  EEG Placement and Labeling of Electrodes: The EEG was performed utilizing 20 channel referential EEG connections (coronal over temporal over parasagittal montage) using all standard 10-20 electrode placements with EKG, with additional electrodes placed in the inferior temporal region using the modified 10-10 montage electrode placements for elective admissions, or if deemed necessary. Recording was at a sampling rate of 256 samples per second per channel. Time synchronized digital video recording was done simultaneously with EEG recording. A low light infrared camera was used for low light recording.   _____________________________________________________________ HISTORY  Patient is a 56y old  Male who presents with a chief complaint of Stroke, left basal ganglia intraparenchymal hemorrhage (16 Aug 2021 20:24)   PERTINENT MEDICATION: levETIRAcetam  IVPB 1000 milliGRAM(s) IV Intermittent every 12 hours propofol Infusion 20 MICROgram(s)/kG/Min (12.8 mL/Hr) IV Continuous <Continuous> veCURonium  Injectable 10 milliGRAM(s) IV Push once  _____________________________________________________________ STUDY INTERPRETATION  Findings: The background was minimally reactive, intermittently in burst-suppression with asynchronous periods of discontinuity lasting 2-4 seconds, followed by burst activity superimposed on diffuse theta and polymorphic delta slowing. No posterior dominant rhythm seen.  Focal Slowing:  Continuous theta/delta slowing in the left > right frontal regions (Max F3).  Sleep Background: Drowsiness and rudimentary stage II sleep transients were recorded.  Other Non-Epileptiform Findings: None were present.  Interictal Epileptiform Activity:  Frequent LPDs - 1-3 Hz, right frontal-central region (F4 max / Fz) Occasional runs of GPDs 0.5-1.5 Hz, at times with triphasic morphology.    Events: Clinical events: None recorded. Seizures: None recorded.  Activation Procedures:  Hyperventilation was not performed.   Photic stimulation was not performed.   Artifacts: Intermittent myogenic and movement artifacts were noted.  ECG: The heart rate on single channel ECG was predominantly between 60-80 BPM, irregular at times  _____________________________________________________________ EEG SUMMARY/CLASSIFICATION  Abnormal EEG in a comatose patient - LPDs - 1-3 Hz, right frontal-central region (F4 max / Fz) - Previously seen left frontal sharp wave discharges are not noted in this record. - Occasional runs of GPDs 0.5-1.5 Hz, at times with triphasic morphology.   - Moderate-severe generalized slowing, at times in burst suppression pattern. - Irregular heart rhythm noted at times. _____________________________________________________________ EEG IMPRESSION/CLINICAL CORRELATE  Abnormal EEG study.  - Potential independent epileptogenic foci in the right frontal, central regions superimposed over diffuse cortical hyperexcitability with GPDs, at times with triphasic morphology suggestive of severe metabolic encephalopathy but can be considered epileptogenic in the correct clinical context.  - Structural or functional abnormality in left > right frontal regions - Moderate-severe nonspecific diffuse or multifocal cerebral dysfunction.  - No seizure seen x 3 days  In absence of additional clinical concerns, recommend consideration for discontinuation of current EEG study with reconnection in future if clinically warranted.   Fan Spencer MD, BRAIN Fellow, [facility]  Todd Irving MD EEG/Epilepsy Attending   [facility] EEG Reading Room Ph#: [phone] Epilepsy Answering Service after 5PM and before 8:30AM: Ph#: [phone].

## 2021-08-20 NOTE — PROGRESS NOTE ADULT - ATTENDING COMMENTS
Carlos Sierra MD  Vascular Neurology    [facility] on top of watershed infarcts causing mental status changes and current exam

## 2021-08-20 NOTE — PROGRESS NOTE ADULT - SUBJECTIVE AND OBJECTIVE BOX
CRITICAL CARE ATTENDING - CTICU    MEDICATIONS  (STANDING):  chlorhexidine 0.12% Liquid 15 milliLiter(s) Oral Mucosa every 12 hours  chlorhexidine 2% Cloths 1 Application(s) Topical <User Schedule>  heparin   Injectable 5000 Unit(s) SubCutaneous every 8 hours  labetalol 200 milliGRAM(s) Oral every 8 hours  levETIRAcetam  IVPB 1000 milliGRAM(s) IV Intermittent every 12 hours  niCARdipine Infusion 2 mG/Hr (10 mL/Hr) IV Continuous <Continuous>  pantoprazole  Injectable 40 milliGRAM(s) IV Push daily  piperacillin/tazobactam IVPB.. 3.375 Gram(s) IV Intermittent every 8 hours  propofol Infusion 20 MICROgram(s)/kG/Min (12.8 mL/Hr) IV Continuous <Continuous>                                    9.4    12.34 )-----------( 246      ( 20 Aug 2021 00:37 )             30.6       08-20    152<H>  |  124<H>  |  30<H>  ----------------------------<  128<H>  4.2   |  17<L>  |  1.57<H>    Ca    9.0      20 Aug 2021 00:35  Phos  3.7     08-20  Mg     2.6     -    TPro  6.0  /  Alb  2.4<L>  /  TBili  0.4  /  DBili  x   /  AST  15  /  ALT  17  /  AlkPhos  113  08-          Mode: AC/ CMV (Assist Control/ Continuous Mandatory Ventilation)  RR (machine): 12  TV (machine): 650  FiO2: 50  PEEP: 8  ITime: 0.8  MAP: 12  PIP: 22      Daily     Daily Weight in k.6 (20 Aug 2021 00:00)      08-18 @ : @ 07:00  --------------------------------------------------------  IN: 3981.4 mL / OUT: 1895 mL / NET: 2086.4 mL     @ : @ 06:56  --------------------------------------------------------  IN: 3602.6 mL / OUT: 2200 mL / NET: 1402.6 mL          Critically Ill patient  : [ ] preoperative ,   [ ] post operative     [x] non operative management     Requires :  [x] Arterial Line   [x] Central Line  [ ] PA catheter  [ ] IABP  [ ] ECMO  [ ] LVAD  [x] Ventilator  [ ] pacemaker [ ] Impella.                      [x] ABG's     [x] Pulse Oxymetry Monitoring  Bedside evaluation , monitoring , treatment of hemodynamics , fluids , IVP/ IVCD meds.        Diagnosis:     Admitted Syncopal episode / hypertension     L  Intracranial Bleed     Type A Aortic Dissection     Multiple brain infarcts    respiratory failure     Ventilator Management:  [x ]AC-rest    [ ]CPAP-PS Wean    [ ]Trach Collar     [ ]Extubate    [ ] T-Piece  [ ]peep>5     Requires chest PT, pulmonary toilet, ambu bagging, suctioning to maintain SaO2,  patent airway and treat atelectasis.     Sedated with IVCD Propofol to maintain vent synchrony     Difficult weaning process - multiple organ system involvement in critically ill patient     Hemodynamic lability,  instability. Requires IVCD [ ] vasopressors [ ] inotropes  [ x] vasodilator  [ x]IVSS fluid  to maintain MAP, perfusion, C.I.     Obtunded mental status / posturing / ? seizure disorder ? / Keppra     Renal Failure - Acute Kidney Injury     Hypernatremia     Tolerates NG / NJ feeds at [x] goal rate    [ ] trophic rate    [ ]       rate     L   Pneumonia - MSSA     Sedation vacation / neurological evaluation               By signing my name below, I, Hannah Coronado, attest that this documentation has been prepared under the direction and in the presence of Gerry Page MD.   Electronically Signed: Shannon Buchanan 21 @ 06:56      Discussed with CT surgeon, Physician Assistant - Nurse Practitioner- Critical care medicine team.   Discussed at  AM / PM rounds.   Chart, labs , films reviewed.    Cumulative Critical Care Time Given Today:  CRITICAL CARE ATTENDING - CTICU    MEDICATIONS  (STANDING):  chlorhexidine 0.12% Liquid 15 milliLiter(s) Oral Mucosa every 12 hours  chlorhexidine 2% Cloths 1 Application(s) Topical <User Schedule>  heparin   Injectable 5000 Unit(s) SubCutaneous every 8 hours  labetalol 200 milliGRAM(s) Oral every 8 hours  levETIRAcetam  IVPB 1000 milliGRAM(s) IV Intermittent every 12 hours  niCARdipine Infusion 2 mG/Hr (10 mL/Hr) IV Continuous <Continuous>  pantoprazole  Injectable 40 milliGRAM(s) IV Push daily  piperacillin/tazobactam IVPB.. 3.375 Gram(s) IV Intermittent every 8 hours  propofol Infusion 20 MICROgram(s)/kG/Min (12.8 mL/Hr) IV Continuous <Continuous>                                    9.4    12.34 )-----------( 246      ( 20 Aug 2021 00:37 )             30.6       08-20    152<H>  |  124<H>  |  30<H>  ----------------------------<  128<H>  4.2   |  17<L>  |  1.57<H>    Ca    9.0      20 Aug 2021 00:35  Phos  3.7     08-20  Mg     2.6     -    TPro  6.0  /  Alb  2.4<L>  /  TBili  0.4  /  DBili  x   /  AST  15  /  ALT  17  /  AlkPhos  113  08-          Mode: AC/ CMV (Assist Control/ Continuous Mandatory Ventilation)  RR (machine): 12  TV (machine): 650  FiO2: 50  PEEP: 8  ITime: 0.8  MAP: 12  PIP: 22      Daily     Daily Weight in k.6 (20 Aug 2021 00:00)      08-18 @ : @ 07:00  --------------------------------------------------------  IN: 3981.4 mL / OUT: 1895 mL / NET: 2086.4 mL     @ : @ 06:56  --------------------------------------------------------  IN: 3602.6 mL / OUT: 2200 mL / NET: 1402.6 mL          Critically Ill patient  : [ ] preoperative ,   [ ] post operative     [x] non operative management     Requires :  [x] Arterial Line   [x] Central Line  [ ] PA catheter  [ ] IABP  [ ] ECMO  [ ] LVAD  [x] Ventilator  [ ] pacemaker [ ] Impella.                      [x] ABG's     [x] Pulse Oxymetry Monitoring  Bedside evaluation , monitoring , treatment of hemodynamics , fluids , IVP/ IVCD meds.        Diagnosis:     Admitted Syncopal episode / hypertension     L  Intracranial Bleed     Type A Aortic Dissection     Multiple bilateral  brain infarcts    respiratory failure     Ventilator Management:  [x ]AC-rest    [ ]CPAP-PS Wean    [ ]Trach Collar     [ ]Extubate    [ ] T-Piece  [ ]peep>5     Requires chest PT, pulmonary toilet, ambu bagging, suctioning to maintain SaO2,  patent airway and treat atelectasis.     Sedated with IVCD Propofol to maintain vent synchrony     Difficult weaning process - multiple organ system involvement in critically ill patient     Hemodynamic lability,  instability. Requires IVCD [ ] vasopressors [ ] inotropes  [ x] vasodilator  [ x]IVSS fluid  to maintain MAP, perfusion, C.I.     Obtunded mental status / posturing / ? seizure disorder ? / Keppra     Renal Failure - Acute Kidney Injury     Hypernatremia     Tolerates NG / NJ feeds at [x] goal rate    [ ] trophic rate    [ ]       rate     L   Pneumonia - MSSA     Sedation vacation / neurological evaluation               By signing my name below, IHannah, attest that this documentation has been prepared under the direction and in the presence of Gerry Page MD.   Electronically Signed: Shannon Buchanan 21 @ 06:56    IGerry, personally performed the services described in this documentation. All medical record entries made by the scribe were at my direction and in my presence. I have reviewed the chart and agree that the record reflects my personal performance and is accurate and complete.   Gerry Page MD.       Discussed with CT surgeon, Physician Assistant - Nurse Practitioner- Critical care medicine team.   Discussed at  AM / PM rounds.   Chart, labs , films reviewed.    Cumulative Critical Care Time Given Today:  30 min

## 2021-08-20 NOTE — EEG REPORT - NS EEG TEXT BOX
REPORT OF CONTINUOUS VIDEO EEG   [facility]: [address] [place] [address] , [address], [place] [address], Ph#: [phone] [facility]: [id number], [place] [address], Ph#: [phone] Office: [address] [place] [address], [place] [address] Ph#: [phone]  Patient Name: LUCY MACIAS Age and : 56y ([birth year]) MRN #: [id number] Location: [place] [address] Referring Physician: Luis Angel Savage  Start Time/Date: 08:00 on 21 End Time/Date: 08:00 on 21 Duration: 21 h 00 mins _____________________________________________________________ STUDY INFORMATION  EEG Recording Technique: The patient underwent continuous Video-EEG monitoring, using Telemetry System hardware on the XLTek Digital System. EEG and video data were stored on a computer hard drive with important events saved in digital archive files. The material was reviewed by a physician (electroencephalographer / epileptologist) on a daily basis. Godfrey and seizure detection algorithms were utilized and reviewed. An EEG Technician attended to the patient, and was available throughout daytime work hours.  The epilepsy center neurologist was available in person or on call 24-hours per day.  EEG Placement and Labeling of Electrodes: The EEG was performed utilizing 20 channel referential EEG connections (coronal over temporal over parasagittal montage) using all standard 10-20 electrode placements with EKG, with additional electrodes placed in the inferior temporal region using the modified 10-10 montage electrode placements for elective admissions, or if deemed necessary. Recording was at a sampling rate of 256 samples per second per channel. Time synchronized digital video recording was done simultaneously with EEG recording. A low light infrared camera was used for low light recording.   _____________________________________________________________ HISTORY  Patient is a 56y old  Male who presents with a chief complaint of Stroke, left basal ganglia intraparenchymal hemorrhage (16 Aug 2021 20:24)   PERTINENT MEDICATION: levETIRAcetam  IVPB 1000 milliGRAM(s) IV Intermittent every 12 hours propofol Infusion 20 MICROgram(s)/kG/Min (12.8 mL/Hr) IV Continuous <Continuous>  _____________________________________________________________ STUDY INTERPRETATION  Findings: The background was continuous reactive, rare periods of suppression superimposed on diffuse theta and polymorphic delta slowing. No posterior dominant rhythm seen.  Focal Slowing:  Continuous theta/delta slowing in the left > right frontal regions (Max F3).  Sleep Background: Drowsiness and rudimentary stage II sleep transients were recorded.  Other Non-Epileptiform Findings: None were present.  Interictal Epileptiform Activity:  Frequent LPDs - 1-3 Hz, right frontal-central region (F4 max / Fz)   Events: Clinical events: None recorded. Seizures: None recorded.  Activation Procedures:  Hyperventilation was not performed.   Photic stimulation was not performed.   Artifacts: Intermittent myogenic and movement artifacts were noted.  ECG: The heart rate on single channel ECG was predominantly between 60-80 BPM, irregular at times  _____________________________________________________________ EEG SUMMARY/CLASSIFICATION  Abnormal EEG in a comatose patient - LPDs - 1-3 Hz, right frontal-central region (F4 max / Fz) - Previously seen left frontal sharp wave discharges are not noted in this record. - Previously seen GPDs 0.5-1.5 Hz with triphasic morphology are not recorded in this study. - Moderate-severe generalized slowing, at times with breif periods of suppression  _____________________________________________________________ EEG IMPRESSION/CLINICAL CORRELATE  Abnormal EEG study.  - Potential independent epileptogenic foci in the right frontal, central regions - Relative improvement of background with resolution of GPDs lessened cortical hyperexcitability - Structural or functional abnormality in left > right frontal regions - Moderate-severe nonspecific diffuse or multifocal cerebral dysfunction.  - No seizure seen x 4 days  In absence of additional clinical concerns, recommend consideration for discontinuation of current EEG study with reconnection in future if clinically warranted.  *** PRELIMINARY REPORT - PENDING EPILEPSY ATTENDING REVIEW ***  Fan Spencer MD, BRAIN Fellow, [facility]    [facility] EEG Reading Room Ph#: [phone] Epilepsy Answering Service after 5PM and before 8:30AM: Ph#: [phone].    REPORT OF CONTINUOUS VIDEO EEG   [facility]: [address] [place] [address] , [address], [place] [address], Ph#: [phone] [facility]: [id number], [place] [address], Ph#: [phone] Office: [address] [place] [address], [place] [address] Ph#: [phone]  Patient Name: LUCY MACIAS Age and : 56y (67) MRN #: [id number] Location: [place] [address] Referring Physician: Luis Angel Savage  Start Time/Date: 08:00 on 21 End Time/Date: 08:00 on 21 Duration: 21 h 00 mins _____________________________________________________________ STUDY INFORMATION  EEG Recording Technique: The patient underwent continuous Video-EEG monitoring, using Telemetry System hardware on the XLTek Digital System. EEG and video data were stored on a computer hard drive with important events saved in digital archive files. The material was reviewed by a physician (electroencephalographer / epileptologist) on a daily basis. Godfrey and seizure detection algorithms were utilized and reviewed. An EEG Technician attended to the patient, and was available throughout daytime work hours.  The epilepsy center neurologist was available in person or on call 24-hours per day.  EEG Placement and Labeling of Electrodes: The EEG was performed utilizing 20 channel referential EEG connections (coronal over temporal over parasagittal montage) using all standard 10-20 electrode placements with EKG, with additional electrodes placed in the inferior temporal region using the modified 10-10 montage electrode placements for elective admissions, or if deemed necessary. Recording was at a sampling rate of 256 samples per second per channel. Time synchronized digital video recording was done simultaneously with EEG recording. A low light infrared camera was used for low light recording.   _____________________________________________________________ HISTORY  Patient is a 56y old  Male who presents with a chief complaint of Stroke, left basal ganglia intraparenchymal hemorrhage (16 Aug 2021 20:24)   PERTINENT MEDICATION: levETIRAcetam  IVPB 1000 milliGRAM(s) IV Intermittent every 12 hours propofol Infusion 20 MICROgram(s)/kG/Min (12.8 mL/Hr) IV Continuous <Continuous>  _____________________________________________________________ STUDY INTERPRETATION  Findings: The background was continuous reactive, rare periods of suppression superimposed on diffuse theta and polymorphic delta slowing. No posterior dominant rhythm seen.  Focal Slowing:  Continuous theta/delta slowing in the left > right frontal regions (Max F3).  Sleep Background: Drowsiness and rudimentary stage II sleep transients were recorded.  Other Non-Epileptiform Findings: None were present.  Interictal Epileptiform Activity:  Frequent LPDs - 1-3 Hz, right frontal-central region (F4 max / Fz)   Events: Clinical events: None recorded. Seizures: None recorded.  Activation Procedures:  Hyperventilation was not performed.   Photic stimulation was not performed.   Artifacts: Intermittent myogenic and movement artifacts were noted.  ECG: The heart rate on single channel ECG was predominantly between 60-80 BPM, irregular at times  _____________________________________________________________ EEG SUMMARY/CLASSIFICATION  Abnormal EEG in a comatose patient - LPDs - 1-3 Hz, right frontal-central region (F4 max / Fz) - Previously seen left frontal sharp wave discharges are not noted in this record. - Previously seen GPDs 0.5-1.5 Hz with triphasic morphology are not recorded in this study. - Moderate-severe generalized slowing, at times with breif periods of suppression  _____________________________________________________________ EEG IMPRESSION/CLINICAL CORRELATE  Abnormal EEG study.  - Potential independent epileptogenic foci in the right frontal, central regions - Relative improvement of background with resolution of GPDs and lessened cortical hyperexcitability - Structural or functional abnormality in left > right frontal regions - Moderate-severe nonspecific diffuse or multifocal cerebral dysfunction.  - No seizure seen x 4 days  In absence of additional clinical concerns, recommend consideration for discontinuation of current EEG study with reconnection in future if clinically warranted.  *** PRELIMINARY REPORT - PENDING EPILEPSY ATTENDING REVIEW ***  Fan Spencer MD, BRAIN Fellow, [facility]    [facility] EEG Reading Room Ph#: [phone] Epilepsy Answering Service after 5PM and before 8:30AM: Ph#: [phone].    REPORT OF CONTINUOUS VIDEO EEG   [facility]: [address] [place] [address] , [address], [place] [address], Ph#: [phone] [facility]: [address], [place] [address], Ph#: [phone] Office: [address] [place] [address], [place] [address] Ph#: [phone]  Patient Name: LUCY MACIAS Age and : 56y (67) MRN #: [id number] Location: [place] [address] Referring Physician: Luis Angel Savage  Start Time/Date: 08:00 on 21 End Time/Date: 08:00 on 21 Duration: 21 h 00 mins _____________________________________________________________ STUDY INFORMATION  EEG Recording Technique: The patient underwent continuous Video-EEG monitoring, using Telemetry System hardware on the XLTek Digital System. EEG and video data were stored on a computer hard drive with important events saved in digital archive files. The material was reviewed by a physician (electroencephalographer / epileptologist) on a daily basis. Godfrey and seizure detection algorithms were utilized and reviewed. An EEG Technician attended to the patient, and was available throughout daytime work hours.  The epilepsy center neurologist was available in person or on call 24-hours per day.  EEG Placement and Labeling of Electrodes: The EEG was performed utilizing 20 channel referential EEG connections (coronal over temporal over parasagittal montage) using all standard 10-20 electrode placements with EKG, with additional electrodes placed in the inferior temporal region using the modified 10-10 montage electrode placements for elective admissions, or if deemed necessary. Recording was at a sampling rate of 256 samples per second per channel. Time synchronized digital video recording was done simultaneously with EEG recording. A low light infrared camera was used for low light recording.   _____________________________________________________________ HISTORY  Patient is a 56y old  Male who presents with a chief complaint of Stroke, left basal ganglia intraparenchymal hemorrhage (16 Aug 2021 20:24)   PERTINENT MEDICATION: levETIRAcetam  IVPB 1000 milliGRAM(s) IV Intermittent every 12 hours propofol Infusion 20 MICROgram(s)/kG/Min (12.8 mL/Hr) IV Continuous <Continuous>  _____________________________________________________________ STUDY INTERPRETATION  Findings: The background was continuous reactive, rare periods of suppression superimposed on diffuse theta and polymorphic delta slowing. No posterior dominant rhythm seen.  Focal Slowing:  Continuous theta/delta slowing in the left > right frontal regions (Max F3).  Sleep Background: Drowsiness and rudimentary stage II sleep transients were recorded.  Other Non-Epileptiform Findings: None were present.  Interictal Epileptiform Activity:  Frequent LPDs - 1-3 Hz, right frontal-central region (F4 max / Fz)   Events: Clinical events: None recorded. Seizures: None recorded.  Activation Procedures:  Hyperventilation was not performed.   Photic stimulation was not performed.   Artifacts: Intermittent myogenic and movement artifacts were noted.  ECG: The heart rate on single channel ECG was predominantly between 60-80 BPM, irregular at times  _____________________________________________________________ EEG SUMMARY/CLASSIFICATION  Abnormal EEG in a comatose patient - LPDs - 1-3 Hz, right frontal-central region (F4 max / Fz) - Previously seen left frontal sharp wave discharges are not noted in this record. - Previously seen GPDs 0.5-1.5 Hz with triphasic morphology are not recorded in this study. - Moderate-severe generalized slowing, at times with breif periods of suppression  _____________________________________________________________ EEG IMPRESSION/CLINICAL CORRELATE  Abnormal EEG study.  - Potential independent epileptogenic foci in the right frontal, central regions - Relative improvement of background with resolution of GPDs and lessened cortical hyperexcitability - Structural or functional abnormality in left > right frontal regions - Moderate-severe nonspecific diffuse or multifocal cerebral dysfunction.  - No seizure seen x 4 days  In absence of additional clinical concerns, recommend consideration for discontinuation of current EEG study with reconnection in future if clinically warranted.  Fan Spencer MD, BRAIN Fellow, [facility]  Todd Irving MD EEG/Epilepsy Attending  [facility] EEG Reading Room Ph#: [phone] Epilepsy Answering Service after 5PM and before 8:30AM: Ph#: [phone].

## 2021-08-20 NOTE — PROGRESS NOTE ADULT - ASSESSMENT
Assessment: 57 yo RH man with a PMHx of chronic pain (takes Advil and Aleve) and ?EtOH abuse/substance abuse who presented as a transfer from [facility] on 8/13 for L BG hemorrhage. Exam today significant for roving eye movements and triple flexion. Previously seen decerebrate posturing now resolved. No seizure activity captured on vEEG x4 days. MRI head shows L BG IPH + innumerable small acute infarcts within the watershed areas of the JEMIMA/MCA territories as well as the MCA/PCA territories bilaterally. Patient still with leukocytosis and is on Zosyn for MSSA PNA.    ICH Score on Admission: 2    Impression: L BG hemorrhage with IVH likely 2/2 HTN vs alternate etiology; MRI head also shows innumerable bilateral watershed territory acute ischemic infarcts, likely secondary to aortic dissection.    Recommendations:  [] Neuro checks Q4HR.  [x] MRI head, results as above.  [] DC vEEG. No evidence of seizure activity.  [] On Keppra 1G Q12HR. Would DC after 7 days given no evidence of seizure or epileptiform activity on EEG.  [] SBP goal <140/90 from a Neurovascular standpoint.  [x] Neurosurgery on board, no acute surgical intervention.  [x] Cardiac team deferring repair of aortic dissection at this time.  [] Would continue to hold all antiplatelets/anticoagulation for now.  [x] A1c 5.3, LDL 68.  [] Rest of care per CTICU.    Case seen and discussed with stroke attending Dr. Sierra.

## 2021-08-20 NOTE — PROGRESS NOTE ADULT - ASSESSMENT
57yo man w/ no known PMH, no medications, presents to ED as transfer from [facility] with left basal ganglia intraparenchymal hemorrhage and incidental finding of Type A dissection.  Patient's brother at bedside and provides history. 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 At [place], CTH noncon was done and demonstrates left basal ganglia hemorrhage. Patient subsequently transferred to [facility] ED, repeat imaging showed possible Type A aortic dissection. Cardiothoracic surgery consulted, CTA CAP showed Type A dissection from distal ascending aorta to distal arch. Pt in [facility] ED started on  nicardipine gtt.  pt also noticed with cr 2. per brother pt has not seen a primary doc.  pt required intubation for airway protection       1- MILDRED   2- type A aortic dissection   3- ICH   4- HTN     mildred in setting of type A aortic dissection with iv contrast with underlying likely ckd and seems to be ranging towards baseline    cardene drip for bp control labetalol 200 mg q8  increase and start taper cardene drip   hypernatremia off 2% nacl drip  uo is improving and so is cr as well.  vent support

## 2021-08-20 NOTE — PROGRESS NOTE ADULT - SUBJECTIVE AND OBJECTIVE BOX
MRN-[id number]    Subjective:      PAST MEDICAL & SURGICAL HISTORY:  No pertinent past medical history    No significant past surgical history      FAMILY HISTORY:  No pertinent family history in first degree relatives      Social Hx:  Nonsmoker, no drug or alcohol use    Home Medications:    MEDICATIONS  (STANDING):  chlorhexidine 0.12% Liquid 15 milliLiter(s) Oral Mucosa every 12 hours  chlorhexidine 2% Cloths 1 Application(s) Topical <User Schedule>  heparin   Injectable 5000 Unit(s) SubCutaneous every 8 hours  labetalol 200 milliGRAM(s) Oral every 8 hours  levETIRAcetam  IVPB 1000 milliGRAM(s) IV Intermittent every 12 hours  niCARdipine Infusion 2 mG/Hr (10 mL/Hr) IV Continuous <Continuous>  pantoprazole  Injectable 40 milliGRAM(s) IV Push daily  piperacillin/tazobactam IVPB.. 3.375 Gram(s) IV Intermittent every 8 hours  propofol Infusion 20 MICROgram(s)/kG/Min (12.8 mL/Hr) IV Continuous <Continuous>    MEDICATIONS  (PRN):  acetaminophen    Suspension .. 650 milliGRAM(s) Enteral Tube every 6 hours PRN Temp greater or equal to 38C (100.4F)    Allergies  No Known Allergies    Intolerances      REVIEW OF SYSTEMS  General:	  Skin/Breast:	  Ophthalmologic:  ENMT:	  Respiratory and Thorax:	  Cardiovascular:	  Gastrointestinal:	  Genitourinary:	  Musculoskeletal:	  Neurological:	  Psychiatric:	  Hematology/Lymphatics:	  Endocrine:	  Allergic/Immunologic:	    ROS: Pertinent positives above, all other ROS were reviewed and are negative.      Vital Signs Last 24 Hrs  T(C): 37.7 (20 Aug 2021 08:00), Max: 38.1 (20 Aug 2021 04:00)  T(F): 99.9 (20 Aug 2021 08:00), Max: 100.6 (20 Aug 2021 04:00)  HR: 59 (20 Aug 2021 08:00) (55 - 82)  BP: --  BP(mean): --  RR: 19 (20 Aug 2021 08:00) (15 - 29)  SpO2: 97% (20 Aug 2021 08:00) (93% - 100%)    GENERAL EXAM:  Constitutional: awake and alert. NAD  HEENT: PERRLA, EOMI  Neck: Supple  Respiratory: Breath sounds are clear bilaterally  Cardiovascular: S1 and S2, regular / irregular rhythm  Gastrointestinal: soft, nontender  Extremities: no edema, no cyanosis  Vascular: no carotid bruits  Musculoskeletal: no joint swelling/tenderness, no abnormal movements  Skin: no rashes    NEUROLOGICAL EXAM:  MS: AAOX3, fluent, attends b/l; recent and remote memory intact; normal attention, language and fund of knowledge.   CN: VFF, EOMI, PERRL, no RAJANI, no APD,  V1-3 intact, no facial asymmetry, t/p midline, SCM/trap intact.  Eyes-Fundi: no papilledema.  Motor: Strength: 5/5 4x. Tone: normal. Bulk: normal. DTR 2+ symm.  Plantar flex b/l. Sensation: intact to LT/PP/Vibration/Position/Temperature 4x.   Coordination: intact 4x.   Gait:  Romberg negative, pull test negative; walks with narrow base, pivots in 2 steps.    NIHSS  mRS    Labs:   cbc                      9.4    12.34 )-----------( 246      ( 20 Aug 2021 00:37 )             30.6     [id number]    152<H>  |  124<H>  |  30<H>  ----------------------------<  128<H>  4.2   |  17<L>  |  1.57<H>    Ca    9.0      20 Aug 2021 00:35  Phos  3.7     08-20  Mg     2.6     08-20    TPro  6.0  /  Alb  2.4<L>  /  TBili  0.4  /  DBili  x   /  AST  15  /  ALT  17  /  AlkPhos  113  08-20    Coags  [id number] Chol 169 LDL -- HDL 89 Trig 56  A1C  Cardiac Markers    LIVER FUNCTIONS - ( 20 Aug 2021 00:35 )  Alb: 2.4 g/dL / Pro: 6.0 g/dL / ALK PHOS: 113 U/L / ALT: 17 U/L / AST: 15 U/L / GGT: x           UA  CSF  Immunological Labs    Radiology: [id number]    Subjective: 57 yo male seen and examined at bedside. Intubated, sedated on Propofol 40mcg.    PAST MEDICAL & SURGICAL HISTORY:  No pertinent past medical history    No significant past surgical history    FAMILY HISTORY:  No pertinent family history in first degree relatives    Social Hx:  Nonsmoker, no drug or alcohol use    MEDICATIONS  (STANDING):  chlorhexidine 0.12% Liquid 15 milliLiter(s) Oral Mucosa every 12 hours  chlorhexidine 2% Cloths 1 Application(s) Topical <User Schedule>  heparin   Injectable 5000 Unit(s) SubCutaneous every 8 hours  labetalol 200 milliGRAM(s) Oral every 8 hours  levETIRAcetam  IVPB 1000 milliGRAM(s) IV Intermittent every 12 hours  niCARdipine Infusion 2 mG/Hr (10 mL/Hr) IV Continuous <Continuous>  pantoprazole  Injectable 40 milliGRAM(s) IV Push daily  piperacillin/tazobactam IVPB.. 3.375 Gram(s) IV Intermittent every 8 hours  propofol Infusion 20 MICROgram(s)/kG/Min (12.8 mL/Hr) IV Continuous <Continuous>    MEDICATIONS  (PRN):  acetaminophen    Suspension .. 650 milliGRAM(s) Enteral Tube every 6 hours PRN Temp greater or equal to 38C (100.4F)    Allergies  No Known Allergies    ROS: Unable to obtain, patient intubated and sedated.     ICU Vital Signs Last 24 Hrs  T(C): 37.7 (20 Aug 2021 08:00), Max: 38.1 (20 Aug 2021 04:00)  T(F): 99.9 (20 Aug 2021 08:00), Max: 100.6 (20 Aug 2021 04:00)  HR: 59 (20 Aug 2021 08:00) (55 - 82)  ABP: 125/52 (20 Aug 2021 08:00) (106/48 - 176/69)  ABP(mean): 73 (20 Aug 2021 08:00) (65 - 100)  RR: 19 (20 Aug 2021 08:00) (15 - 29)  SpO2: 97% (20 Aug 2021 08:00) (93% - 100%)    GENERAL EXAM:  Constitutional: Intubated, sedated on Propofol 40mcg for vent synchrony.    NEUROLOGICAL EXAM:  MS: Eyes closed, do not open to verbal/noxious stimuli. No verbal output, does not follow commands.  CN: PERRL (sluggish), +corneal reflex bilaterally. Eyes cross midline with roving movements. +gag.  Motor: Trace withdrawal in RUE. No movement LUE. Triple flexion b/l lower extremities.  Reflexes: Toes upgoing bilaterally with triple flexion bilaterally.  Coordination/Gait: Unable to assess.     Labs:   cbc                      9.4    12.34 )-----------( 246      ( 20 Aug 2021 00:37 )             30.6     [id number]    152<H>  |  124<H>  |  30<H>  ----------------------------<  128<H>  4.2   |  17<L>  |  1.57<H>    Ca    9.0      20 Aug 2021 00:35  Phos  3.7     08-20  Mg     2.6     08-20    TPro  6.0  /  Alb  2.4<L>  /  TBili  0.4  /  DBili  x   /  AST  15  /  ALT  17  /  AlkPhos  113  08-20    [id number] Chol 169 LDL -- HDL 89 Trig 56    LIVER FUNCTIONS - ( 20 Aug 2021 00:35 )  Alb: 2.4 g/dL / Pro: 6.0 g/dL / ALK PHOS: 113 U/L / ALT: 17 U/L / AST: 15 U/L / GGT: x           Radiology/EEG:  -08/14 CTH: slightly increased intracranial hemorrhage within the LEFT basal ganglia measuring 5.6 x 2.6 x 3.8 cm.    Small retention cysts in the BILATERAL maxillary sinuses.  -08/14 Repeat CTH: Redemonstration of left basal ganglia hemorrhage measuring 2.3 x 5.7 x 3.3 cm, not significantly changed.  -08/14 CTA NECK AND BRAIN: No vessel occlusion, hemodynamically significant stenosis, dissection, or aneurysm. The smaller left vertebral artery is not well visualized in its entirety. Whether this is due simply to motion is unclear.  -08/15 CTH: Unchanged left basal ganglia hemorrhage with similar mass effect on left lateral ventricle. Redemonstration of layering hemorrhage within the left lateral ventricle. No new intracranial hemorrhage.  -08/16 CTH: No significant interval change in size of the acute intraparenchymal hematoma within the left basal ganglia, currently measuring 5.3 x 2.3 x 3.4 cm. Stable small amount of hemorrhage in the left occipital horn. There is mild surrounding mass effect without appreciable midline shift. Basal cisterns are patent. There are, however, multiple new small hypodensities within the watershed areas of the JEMIMA/MCA territories bilaterally raising concern for acute ischemic infarcts.  -08/16 MRI Head w/o: No significant interval change in size of the acute intraparenchymal hematoma within the left basal ganglia, currently measuring 5.3 x 2.3 x 3.4 cm. Stable small amount of hemorrhage in the left occipital horn. There is mild surrounding mass effect without appreciable midline shift. Basal cisterns are patent. Innumerable small acute infarcts within the watershed areas of the JEMIMA/MCA territories as well as the MCA/PCA territories bilaterally. Single acute lacunar infarct involving the lateral left cerebellum.  -08/19 CTH: Acute parenchymal hemorrhage again seen swelling above. Extensive periventricular white matter lucency is identified and more conspicuous when compared with the prior exam.    -08/17 EEG SUMMARY/CLASSIFICATION  Abnormal EEG in a comatose patient  - LPDs - 1-3 Hz, right frontal-central region (F4 max / Fz)  - Occasional independent left frontal sharp wave discharges (F3 max)  - Intermittent runs of GPDs 0.5-1.5 Hz, at times with triphasic morphology.    - Moderate-severe generalized slowing.  - Irregular heart rhythm noted at times.  _____________________________________________________________  EEG IMPRESSION/CLINICAL CORRELATE  Abnormal EEG study.  - Potential independent epileptogenic foci in the bilateral frontal, central regions (right greater than left) superimposed over diffuse cortical hyperexcitability with GPDs, at times with triphasic morphology suggestive of severe metabolic encephalopathy but can be considered epileptogenic in the correct clinical context.   - Structural or functional abnormality in left > right frontal regions  - Moderate-severe nonspecific diffuse or multifocal cerebral dysfunction.     -08/18 EEG SUMMARY/CLASSIFICATION  Abnormal EEG in a comatose patient  - LPDs - 1-3 Hz, right frontal-central region (F4 max / Fz)  - Occasional independent left frontal sharp wave discharges (F3 max)  - Intermittent runs of GPDs 0.5-1.5 Hz, at times with triphasic morphology.    - Moderate-severe generalized slowing.  - Irregular heart rhythm noted at times.  _____________________________________________________________  EEG IMPRESSION/CLINICAL CORRELAT  Abnormal EEG study.  - Potential independent epileptogenic foci in the bilateral frontal, central regions (right greater than left) superimposed over diffuse cortical hyperexcitability with GPDs, at times with triphasic morphology suggestive of severe metabolic encephalopathy but can be considered epileptogenic in the correct clinical context.   - Structural or functional abnormality in left > right frontal regions  - Moderate-severe nonspecific diffuse or multifocal cerebral dysfunction.     -08/19 EEG SUMMARY/CLASSIFICATION  Abnormal EEG in a comatose patient  - LPDs - 1-3 Hz, right frontal-central region (F4 max / Fz)  - Previously seen left frontal sharp wave discharges are not noted in this record.  - Occasional runs of GPDs 0.5-1.5 Hz, at times with triphasic morphology.    - Moderate-severe generalized slowing, at times in burst suppression pattern.  - Irregular heart rhythm noted at times.  _____________________________________________________________  EEG IMPRESSION/CLINICAL CORRELATE  Abnormal EEG study.  - Potential independent epileptogenic foci in the right frontal, central regions superimposed over diffuse cortical hyperexcitability with GPDs, at times with triphasic morphology suggestive of severe metabolic encephalopathy but can be considered epileptogenic in the correct clinical context.   - Structural or functional abnormality in left > right frontal regions  - Moderate-severe nonspecific diffuse or multifocal cerebral dysfunction.   - No seizure seen x 3 days    -08/20 EEG SUMMARY/CLASSIFICATION  Abnormal EEG in a comatose patient  - LPDs - 1-3 Hz, right frontal-central region (F4 max / Fz)  - Previously seen left frontal sharp wave discharges are not noted in this record.  - Previously seen GPDs 0.5-1.5 Hz with triphasic morphology are not recorded in this study.  - Moderate-severe generalized slowing, at times with breif periods of suppression  _____________________________________________________________  EEG IMPRESSION/CLINICAL CORRELATE  Abnormal EEG study.  - Potential independent epileptogenic foci in the right frontal, central regions  - Relative improvement of background with resolution of GPDs and lessened cortical hyperexcitability  - Structural or functional abnormality in left > right frontal regions  - Moderate-severe nonspecific diffuse or multifocal cerebral dysfunction.   - No seizure seen x 4 days

## 2021-08-20 NOTE — PROGRESS NOTE ADULT - SUBJECTIVE AND OBJECTIVE BOX
[facility]   [phone]  RENAL FOLLOW UP NOTE  --------------------------------------------------------------------------------  Chief Complaint:    24 hour events/subjective:    seen earlier   intubated     PAST HISTORY  --------------------------------------------------------------------------------  No significant changes to PMH, PSH, FHx, SHx, unless otherwise noted    ALLERGIES & MEDICATIONS  --------------------------------------------------------------------------------  Allergies    No Known Allergies    Intolerances      Standing Inpatient Medications  amLODIPine   Tablet 10 milliGRAM(s) Oral daily  chlorhexidine 0.12% Liquid 15 milliLiter(s) Oral Mucosa every 12 hours  chlorhexidine 2% Cloths 1 Application(s) Topical <User Schedule>  heparin   Injectable 5000 Unit(s) SubCutaneous every 8 hours  labetalol 200 milliGRAM(s) Oral every 8 hours  levETIRAcetam  IVPB 1000 milliGRAM(s) IV Intermittent every 12 hours  niCARdipine Infusion 2 mG/Hr IV Continuous <Continuous>  pantoprazole  Injectable 40 milliGRAM(s) IV Push daily  piperacillin/tazobactam IVPB.. 3.375 Gram(s) IV Intermittent every 8 hours  propofol Infusion 20 MICROgram(s)/kG/Min IV Continuous <Continuous>    PRN Inpatient Medications  acetaminophen    Suspension .. 650 milliGRAM(s) Enteral Tube every 6 hours PRN      REVIEW OF SYSTEMS  --------------------------------------------------------------------------------        VITALS/PHYSICAL EXAM  --------------------------------------------------------------------------------  T(C): 37.9 (08-20-21 @ 12:00), Max: 38.1 (08-20-21 @ 04:00)  HR: 62 (08-20-21 @ 19:00) (55 - 68)  BP: --  RR: 21 (08-20-21 @ 19:00) (15 - 39)  SpO2: 96% (08-20-21 @ 19:00) (96% - 100%)  Wt(kg): --        08-19-21 @ 07:01  -  08-20-21 @ 07:00  --------------------------------------------------------  IN: 3602.6 mL / OUT: 2200 mL / NET: 1402.6 mL    08-20-21 @ 07:01  -  08-20-21 @ 19:31  --------------------------------------------------------  IN: 1837 mL / OUT: 1210 mL / NET: 627 mL      Physical Exam:  	  Gen: intubated   	no jvd   	Pulm: decrease bs  no rales or ronchi or wheezing  	CV: RRR, S1S2; no rub  	Abd: +BS, soft, nontender/nondistended  	UE: Warm, no cyanosis  no clubbing,  no edema  	LE: Warm, no cyanosis  no clubbing,  + 1 edema      LABS/STUDIES  --------------------------------------------------------------------------------              9.4    12.34 >-----------<  246      [08-20-21 @ 00:37]              30.6     152  |  124  |  30  ----------------------------<  128      [08-20-21 @ 00:35]  4.2   |  17  |  1.57        Ca     9.0     [08-20-21 @ 00:35]      Mg     2.6     [08-20-21 @ 00:35]      Phos  3.7     [08-20-21 @ 00:35]    TPro  6.0  /  Alb  2.4  /  TBili  0.4  /  DBili  x   /  AST  15  /  ALT  17  /  AlkPhos  113  [08-20-21 @ 00:35]          Creatinine Trend:  SCr 1.57 [08-20 @ 00:35]  SCr 1.60 [08-19 @ 17:56]  SCr 1.73 [08-19 @ 08:33]  SCr 1.75 [08-19 @ 00:39]  SCr 1.53 [08-18 @ 18:38]              Urinalysis - [08-15-21 @ 13:52]      Color Yellow / Appearance Slightly Turbid / SG 1.050 / pH 5.5      Gluc Negative / Ketone Negative  / Bili Negative / Urobili Negative       Blood Moderate / Protein 30 mg/dL / Leuk Est Small / Nitrite Negative      RBC 9 / WBC 13 / Hyaline 106 / Gran  / Sq Epi  / Non Sq Epi 10 / Bacteria Negative      TSH 1.33      [08-13-21 @ 14:52]  Lipid: chol 169, TG 56, HDL 89, LDL --      [08-13-21 @ 13:21]

## 2021-08-21 NOTE — EEG REPORT - NS EEG TEXT BOX
REPORT OF CONTINUOUS VIDEO EEG   [facility]: [address] [place] [address] , [address], [place] [address], Ph#: [phone] [facility]: [id number], [place] [address], Ph#: [phone] Office: [address] [place] [address], [place] [address] Ph#: [phone]  Patient Name: LUCY MACIAS Age and : 56y ([birth year]) MRN #: [id number] Location: [place] [address] Referring Physician: Luis Angel Savage  Start Time/Date: 08:00 on 21 End Time/Date: 17:00 on 21 Duration: 9 h 00 mins _____________________________________________________________ STUDY INFORMATION  EEG Recording Technique: The patient underwent continuous Video-EEG monitoring, using Telemetry System hardware on the XLTek Digital System. EEG and video data were stored on a computer hard drive with important events saved in digital archive files. The material was reviewed by a physician (electroencephalographer / epileptologist) on a daily basis. Godfrey and seizure detection algorithms were utilized and reviewed. An EEG Technician attended to the patient, and was available throughout daytime work hours.  The epilepsy center neurologist was available in person or on call 24-hours per day.  EEG Placement and Labeling of Electrodes: The EEG was performed utilizing 20 channel referential EEG connections (coronal over temporal over parasagittal montage) using all standard 10-20 electrode placements with EKG, with additional electrodes placed in the inferior temporal region using the modified 10-10 montage electrode placements for elective admissions, or if deemed necessary. Recording was at a sampling rate of 256 samples per second per channel. Time synchronized digital video recording was done simultaneously with EEG recording. A low light infrared camera was used for low light recording.   _____________________________________________________________ HISTORY  Patient is a 56y old  Male who presents with a chief complaint of Stroke, left basal ganglia intraparenchymal hemorrhage (16 Aug 2021 20:24)   PERTINENT MEDICATION: levETIRAcetam  IVPB 1000 milliGRAM(s) IV Intermittent every 12 hours propofol Infusion 20 MICROgram(s)/kG/Min (12.8 mL/Hr) IV Continuous <Continuous>  _____________________________________________________________ STUDY INTERPRETATION  Findings: The background was continuous reactive, rare periods of suppression superimposed on diffuse theta and polymorphic delta slowing. No posterior dominant rhythm seen.  Focal Slowing:  Continuous theta/delta slowing in the left > right frontal regions (Max F3).  Sleep Background: Drowsiness and rudimentary stage II sleep transients were recorded.  Other Non-Epileptiform Findings: None were present.  Interictal Epileptiform Activity:   Events: Clinical events: None recorded. Seizures: None recorded.  Activation Procedures:  Hyperventilation was not performed.   Photic stimulation was not performed.   Artifacts: Intermittent myogenic and movement artifacts were noted.  ECG: The heart rate on single channel ECG was predominantly between 60-80 BPM, irregular at times  _____________________________________________________________ EEG SUMMARY/CLASSIFICATION  Abnormal EEG in a comatose patient - Moderate-severe generalized slowing, at times with breif periods of suppression  _____________________________________________________________ EEG IMPRESSION/CLINICAL CORRELATE  Abnormal EEG study. - Moderate-severe nonspecific diffuse or multifocal cerebral dysfunction.   Roman Larsen MD PGY-5 Epilepsy Fellow  This Preliminary report is based on fellow review. Final report pending attending review.  Reading Room: [phone] On Call Service After Hours: [phone]   REPORT OF CONTINUOUS VIDEO EEG   [facility]: [address] [place] [address] , [address], [place] [address], Ph#: [phone] [facility]: [id number], [place] [address], Ph#: [phone] Office: [address] [place] [address], [place] [address] Ph#: [phone]  Patient Name: LUCY MACIAS Age and : 56y (67) MRN #: [id number] Location: [place] [address] Referring Physician: Luis Angel Savage  Start Time/Date: 08:00 on 21 End Time/Date: 17:00 on 21 Duration: 9 h 00 mins _____________________________________________________________ STUDY INFORMATION  EEG Recording Technique: The patient underwent continuous Video-EEG monitoring, using Telemetry System hardware on the XLTek Digital System. EEG and video data were stored on a computer hard drive with important events saved in digital archive files. The material was reviewed by a physician (electroencephalographer / epileptologist) on a daily basis. Godfrey and seizure detection algorithms were utilized and reviewed. An EEG Technician attended to the patient, and was available throughout daytime work hours.  The epilepsy center neurologist was available in person or on call 24-hours per day.  EEG Placement and Labeling of Electrodes: The EEG was performed utilizing 20 channel referential EEG connections (coronal over temporal over parasagittal montage) using all standard 10-20 electrode placements with EKG, with additional electrodes placed in the inferior temporal region using the modified 10-10 montage electrode placements for elective admissions, or if deemed necessary. Recording was at a sampling rate of 256 samples per second per channel. Time synchronized digital video recording was done simultaneously with EEG recording. A low light infrared camera was used for low light recording.   _____________________________________________________________ HISTORY  Patient is a 56y old  Male who presents with a chief complaint of Stroke, left basal ganglia intraparenchymal hemorrhage (16 Aug 2021 20:24)   PERTINENT MEDICATION: levETIRAcetam  IVPB 1000 milliGRAM(s) IV Intermittent every 12 hours propofol Infusion 20 MICROgram(s)/kG/Min (12.8 mL/Hr) IV Continuous <Continuous>  _____________________________________________________________ STUDY INTERPRETATION  Findings: The background was continuous reactive, rare periods of suppression superimposed on diffuse theta and polymorphic delta slowing. No posterior dominant rhythm seen.  Focal Slowing:  Continuous theta/delta slowing in the left > right frontal regions (Max F3).  Sleep Background: Drowsiness and rudimentary stage II sleep transients were recorded.  Other Non-Epileptiform Findings: None were present.  Interictal Epileptiform Activity:   Events: Clinical events: None recorded. Seizures: None recorded.  Activation Procedures:  Hyperventilation was not performed.   Photic stimulation was not performed.   Artifacts: Intermittent myogenic and movement artifacts were noted.  ECG: The heart rate on single channel ECG was predominantly between 60-80 BPM, irregular at times  _____________________________________________________________ EEG SUMMARY/CLASSIFICATION  Abnormal EEG in a comatose patient - Moderate-severe generalized slowing, at times with breif periods of suppression  _____________________________________________________________ EEG IMPRESSION/CLINICAL CORRELATE  Abnormal EEG study. - Moderate-severe nonspecific diffuse or multifocal cerebral dysfunction.   Roman Larsen MD PGY-5 Epilepsy Fellow   Reading Room: [phone] On Call Service After Hours: [phone]

## 2021-08-21 NOTE — PROGRESS NOTE ADULT - ASSESSMENT
57yo man w/ no known PMH, no medications, presents to ED as transfer from [facility] with left basal ganglia intraparenchymal hemorrhage and incidental finding of Type A dissection.  Patient's brother at bedside and provides history. 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 At [place], CTH noncon was done and demonstrates left basal ganglia hemorrhage. Patient subsequently transferred to [facility] ED, repeat imaging showed possible Type A aortic dissection. Cardiothoracic surgery consulted, CTA CAP showed Type A dissection from distal ascending aorta to distal arch. Pt in [facility] ED started on  nicardipine gtt.  pt also noticed with cr 2. per brother pt has not seen a primary doc.  pt required intubation for airway protection       1- MILDRED   2- type A aortic dissection   3- ICH   4- HTN     mildred in setting of type A aortic dissection with iv contrast with underlying likely ckd and seems to be ranging towards baseline    cardene drip for bp control  increase labetalol 600 mg q8 clonidine 0.2 mg tid    uo is improving and so is cr as well.  vent support   na is high but acceptable in setting of ICH trend na for now

## 2021-08-21 NOTE — PROGRESS NOTE ADULT - SUBJECTIVE AND OBJECTIVE BOX
LUCY MACIAS  MRN-[id number]  Patient is a 56y old  Male who presents with a chief complaint of Stroke, left basal ganglia intraparenchymal hemorrhage (20 Aug 2021 19:31)    HPI:  55yo man w/ no known PMH, no medications, presents to ED as transfer from [facility] with left basal ganglia intraparenchymal hemorrhage and incidental finding of Type A dissection.  Patient's brother at bedside and provides history. 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 Pt denies any fall, syncope, trauma although per EMS reports patient had a fall and was found with word finding difficulty and right-sided weakness. At [place], CTH noncon was done and demonstrates left basal ganglia hemorrhage. Patient subsequently transferred to [facility] ED, repeat imaging showed possible Type A aortic dissection. Cardiothoracic surgery consulted, CTA CAP showed Type A dissection from distal ascending aorta to distal arch. Pt denies any chest pain, palpitations, SOB, N/V/D, numbness, tingling in extremities. Pt in [facility] ED on nicardipine gtt. (13 Aug 2021 14:59)      Surgery/Hospital course:    Today:    REVIEW OF SYSTEMS:  Gen: No fever  EYES/ENT: No visual changes;  No vertigo or throat pain   NECK: No pain   RES:  No shortness of breath or Cough  Chest: + incisional pain  CARD: No chest pain   GI: No abdominal pain  : No dysuria  NEURO: No weakness  SKIN: No itching, rashes     Physical Exam:  Vital Signs Last 24 Hrs  T(C): 37.9 (21 Aug 2021 16:00), Max: 37.9 (21 Aug 2021 16:00)  T(F): 100.2 (21 Aug 2021 16:00), Max: 100.2 (21 Aug 2021 16:00)  HR: 74 (21 Aug 2021 16:15) (53 - 74)  BP: 154/75 (21 Aug 2021 12:15) (142/78 - 154/75)  BP(mean): 108 (21 Aug 2021 12:15) (103 - 108)  RR: 22 (21 Aug 2021 16:15) (12 - 38)  SpO2: 95% (21 Aug 2021 16:15) (93% - 97%)  Gen:  Awake, alert   CNS: non focal 	  Neck: no JVD  RES : clear , no wheezing    Chest:   + chest tubes                     CVS: Regular  rhythm. Normal S1/S2  Abd: Soft, non-distended. Bowel sounds present.  Skin: No rash.  Ext:  no edema, A Line  PSY:  ============================I/O===========================   I&O's Detail    20 Aug 2021 07:01  -  21 Aug 2021 07:00  --------------------------------------------------------  IN:    Enteral Tube Flush: 40 mL    NiCARdipine: 1655 mL    Propofol: 495.2 mL    Vital1.0: 1560 mL  Total IN: 3750.2 mL    OUT:    Indwelling Catheter - Urethral (mL): 2475 mL  Total OUT: 2475 mL    Total NET: 1275.2 mL      21 Aug 2021 07:01  -  21 Aug 2021 16:40  --------------------------------------------------------  IN:    Enteral Tube Flush: 50 mL    IV PiggyBack: 100 mL    NiCARdipine: 750 mL    Propofol: 178.8 mL    Vital1.0: 650 mL  Total IN: 1728.8 mL    OUT:    Indwelling Catheter - Urethral (mL): 2050 mL  Total OUT: 2050 mL    Total NET: -321.2 mL        ============================ LABS =========================                        9.7    10.51 )-----------( 285      ( 21 Aug 2021 00:57 )             31.4     08-21    152<H>  |  122<H>  |  27<H>  ----------------------------<  152<H>  4.3   |  19<L>  |  1.47<H>    Ca    9.1      21 Aug 2021 00:57  Phos  3.5     08-21  Mg     2.6     08-21    TPro  6.7  /  Alb  2.9<L>  /  TBili  0.5  /  DBili  x   /  AST  23  /  ALT  22  /  AlkPhos  141<H>  08-21    LIVER FUNCTIONS - ( 21 Aug 2021 00:57 )  Alb: 2.9 g/dL / Pro: 6.7 g/dL / ALK PHOS: 141 U/L / ALT: 22 U/L / AST: 23 U/L / GGT: x             ABG - ( 21 Aug 2021 14:01 )  pH, Arterial: 7.41  pH, Blood: x     /  pCO2: 35    /  pO2: 77    / HCO3: 22    / Base Excess: -2.0  /  SaO2: 97.9                ======================Micro/Rad/Cardio=================  Culture: Reviewed   CXR: Reviewed  Echo:Reviewed  ======================================================  PAST MEDICAL & SURGICAL HISTORY:  No pertinent past medical history    No significant past surgical history      ====================ASSESMENT ==============  CNS:  RES:  CVS:  Hem:  Moe:  GI:  Endo:  ID:  Skin  Plan:  ====================== NEUROLOGY=====================  acetaminophen    Suspension .. 650 milliGRAM(s) Enteral Tube every 6 hours PRN Temp greater or equal to 38C (100.4F)  levETIRAcetam  IVPB 1000 milliGRAM(s) IV Intermittent every 12 hours  propofol Infusion 20 MICROgram(s)/kG/Min (12.8 mL/Hr) IV Continuous <Continuous>    ==================== RESPIRATORY======================  Mechanical Ventilation:  Mode: AC/ CMV (Assist Control/ Continuous Mandatory Ventilation)  RR (machine): 12  TV (machine): 650  FiO2: 50  PEEP: 7  ITime: 1  MAP: 10  PIP: 23      ====================CARDIOVASCULAR==================  amLODIPine   Tablet 10 milliGRAM(s) Oral daily  cloNIDine 0.2 milliGRAM(s) Oral every 8 hours  labetalol 400 milliGRAM(s) Oral every 8 hours  niCARdipine Infusion 2 mG/Hr (10 mL/Hr) IV Continuous <Continuous>    ===================HEMATOLOGIC/ONC ===================  heparin   Injectable 5000 Unit(s) SubCutaneous every 8 hours    ===================== RENAL =========================  Butcher for monitoring urine output    ==================== GASTROINTESTINAL===================  pantoprazole  Injectable 40 milliGRAM(s) IV Push daily    =======================    ENDOCRINE  =====================    ========================INFECTIOUS DISEASE================  piperacillin/tazobactam IVPB.. 3.375 Gram(s) IV Intermittent every 8 hours  vancomycin  IVPB        .crit           LUCY MACIAS  MRN-[id number]  Patient is a 56y old  Male who presents with a chief complaint of Stroke, left basal ganglia intraparenchymal hemorrhage (20 Aug 2021 19:31)    HPI:  57yo man w/ no known PMH, no medications, presents to ED as transfer from [facility] with left basal ganglia intraparenchymal hemorrhage and incidental finding of Type A dissection.  Patient's brother at bedside and provides history. 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 Pt denies any fall, syncope, trauma although per EMS reports patient had a fall and was found with word finding difficulty and right-sided weakness. At [place], CTH noncon was done and demonstrates left basal ganglia hemorrhage. Patient subsequently transferred to [facility] ED, repeat imaging showed possible Type A aortic dissection. Cardiothoracic surgery consulted, CTA CAP showed Type A dissection from distal ascending aorta to distal arch. Pt denies any chest pain, palpitations, SOB, N/V/D, numbness, tingling in extremities. Pt in [facility] ED on nicardipine gtt. (13 Aug 2021 14:59)    Surgery/Hospital course:    Today:    REVIEW OF SYSTEMS:  Gen: No fever  EYES/ENT: No visual changes;  No vertigo or throat pain   NECK: No pain   RES:  No shortness of breath or Cough  Chest: + incisional pain  CARD: No chest pain   GI: No abdominal pain  : No dysuria  NEURO: No weakness  SKIN: No itching, rashes     Physical Exam:  Vital Signs Last 24 Hrs  T(C): 37.9 (21 Aug 2021 16:00), Max: 37.9 (21 Aug 2021 16:00)  T(F): 100.2 (21 Aug 2021 16:00), Max: 100.2 (21 Aug 2021 16:00)  HR: 74 (21 Aug 2021 16:15) (53 - 74)  BP: 154/75 (21 Aug 2021 12:15) (142/78 - 154/75)  BP(mean): 108 (21 Aug 2021 12:15) (103 - 108)  RR: 22 (21 Aug 2021 16:15) (12 - 38)  SpO2: 95% (21 Aug 2021 16:15) (93% - 97%)  Gen:  Awake, alert   CNS: non focal 	  Neck: no JVD  RES : clear , no wheezing    Chest:   + chest tubes                     CVS: Regular  rhythm. Normal S1/S2  Abd: Soft, non-distended. Bowel sounds present.  Skin: No rash.  Ext:  no edema, A Line  PSY:  ============================I/O===========================   I&O's Detail    20 Aug 2021 07:01  -  21 Aug 2021 07:00  --------------------------------------------------------  IN:    Enteral Tube Flush: 40 mL    NiCARdipine: 1655 mL    Propofol: 495.2 mL    Vital1.0: 1560 mL  Total IN: 3750.2 mL    OUT:    Indwelling Catheter - Urethral (mL): 2475 mL  Total OUT: 2475 mL    Total NET: 1275.2 mL      21 Aug 2021 07:01  -  21 Aug 2021 16:40  --------------------------------------------------------  IN:    Enteral Tube Flush: 50 mL    IV PiggyBack: 100 mL    NiCARdipine: 750 mL    Propofol: 178.8 mL    Vital1.0: 650 mL  Total IN: 1728.8 mL    OUT:    Indwelling Catheter - Urethral (mL): 2050 mL  Total OUT: 2050 mL    Total NET: -321.2 mL        ============================ LABS =========================                        9.7    10.51 )-----------( 285      ( 21 Aug 2021 00:57 )             31.4     08-21    152<H>  |  122<H>  |  27<H>  ----------------------------<  152<H>  4.3   |  19<L>  |  1.47<H>    Ca    9.1      21 Aug 2021 00:57  Phos  3.5     08-21  Mg     2.6     08-21    TPro  6.7  /  Alb  2.9<L>  /  TBili  0.5  /  DBili  x   /  AST  23  /  ALT  22  /  AlkPhos  141<H>  08-21    LIVER FUNCTIONS - ( 21 Aug 2021 00:57 )  Alb: 2.9 g/dL / Pro: 6.7 g/dL / ALK PHOS: 141 U/L / ALT: 22 U/L / AST: 23 U/L / GGT: x             ABG - ( 21 Aug 2021 14:01 )  pH, Arterial: 7.41  pH, Blood: x     /  pCO2: 35    /  pO2: 77    / HCO3: 22    / Base Excess: -2.0  /  SaO2: 97.9                ======================Micro/Rad/Cardio=================  Culture: Reviewed   CXR: Reviewed  Echo:Reviewed  ======================================================  PAST MEDICAL & SURGICAL HISTORY:  No pertinent past medical history    No significant past surgical history      ====================ASSESMENT ==============  Left basal ganglia intraparenchymal hemorrhagic stroke   Type A dissection, Acute Intraparenchymal hematoma   CKD  Leukocytosis     Admitted Syncopal episode / hypertension     L  Intracranial Bleed     Type A Aortic Dissection     Multiple bilateral  brain infarcts    respiratory failure     Ventilator Management:  [x ]AC-rest    [ ]CPAP-PS Wean    [ ]Trach Collar     [ ]Extubate    [ ] T-Piece  [ ]peep>5     Requires chest PT, pulmonary toilet, ambu bagging, suctioning to maintain SaO2,  patent airway and treat atelectasis.     Sedated with IVCD Propofol to maintain vent synchrony     Difficult weaning process - multiple organ system involvement in critically ill patient     Hemodynamic lability,  instability. Requires IVCD [ ] vasopressors [ ] inotropes  [ x] vasodilator  [ x]IVSS fluid  to maintain MAP, perfusion, C.I.     Obtunded mental status / posturing / ? seizure disorder ? / Keppra     Renal Failure - Acute Kidney Injury     Hypernatremia     Tolerates NG / NJ feeds at [x] goal rate    [ ] trophic rate    [ ]       rate     L   Pneumonia - MSSA     Sedation vacation / neurological evaluation               CNS:  RES:  CVS:  Hem:  Moe:  GI:  Endo:  ID:  Skin  Plan:  ====================== NEUROLOGY=====================  acetaminophen    Suspension .. 650 milliGRAM(s) Enteral Tube every 6 hours PRN Temp greater or equal to 38C (100.4F)  levETIRAcetam  IVPB 1000 milliGRAM(s) IV Intermittent every 12 hours  propofol Infusion 20 MICROgram(s)/kG/Min (12.8 mL/Hr) IV Continuous <Continuous>    ==================== RESPIRATORY======================  Mechanical Ventilation:  Mode: AC/ CMV (Assist Control/ Continuous Mandatory Ventilation)  RR (machine): 12  TV (machine): 650  FiO2: 50  PEEP: 7  ITime: 1  MAP: 10  PIP: 23      ====================CARDIOVASCULAR==================  amLODIPine   Tablet 10 milliGRAM(s) Oral daily  cloNIDine 0.2 milliGRAM(s) Oral every 8 hours  labetalol 400 milliGRAM(s) Oral every 8 hours  niCARdipine Infusion 2 mG/Hr (10 mL/Hr) IV Continuous <Continuous>    ===================HEMATOLOGIC/ONC ===================  heparin   Injectable 5000 Unit(s) SubCutaneous every 8 hours    ===================== RENAL =========================  Butcher for monitoring urine output    ==================== GASTROINTESTINAL===================  pantoprazole  Injectable 40 milliGRAM(s) IV Push daily    =======================    ENDOCRINE  =====================    ========================INFECTIOUS DISEASE================  piperacillin/tazobactam IVPB.. 3.375 Gram(s) IV Intermittent every 8 hours  vancomycin  IVPB        .crit           LUCY MACIAS  MRN-[id number]  Patient is a 56y old  Male who presents with a chief complaint of Stroke, left basal ganglia intraparenchymal hemorrhage (20 Aug 2021 19:31)    HPI:  57yo man w/ no known PMH, no medications, presents to ED as transfer from [facility] with left basal ganglia intraparenchymal hemorrhage and incidental finding of Type A dissection.  Patient's brother at bedside and provides history. 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 Pt denies any fall, syncope, trauma although per EMS reports patient had a fall and was found with word finding difficulty and right-sided weakness. At [place], CTH noncon was done and demonstrates left basal ganglia hemorrhage. Patient subsequently transferred to [facility] ED, repeat imaging showed possible Type A aortic dissection. Cardiothoracic surgery consulted, CTA CAP showed Type A dissection from distal ascending aorta to distal arch. Pt denies any chest pain, palpitations, SOB, N/V/D, numbness, tingling in extremities. Pt in [facility] ED on nicardipine gtt. (13 Aug 2021 14:59)    Surgery/Hospital course:   intubated, full vent support, unresponsive   head CT intracranial bleed, watershed infarct    REVIEW OF SYSTEMS:   not obtainable      Physical Exam:  Vital Signs Last 24 Hrs  T(C): 37.9 (21 Aug 2021 16:00), Max: 37.9 (21 Aug 2021 16:00)  T(F): 100.2 (21 Aug 2021 16:00), Max: 100.2 (21 Aug 2021 16:00)  HR: 74 (21 Aug 2021 16:15) (53 - 74)  BP: 154/75 (21 Aug 2021 12:15) (142/78 - 154/75)  BP(mean): 108 (21 Aug 2021 12:15) (103 - 108)  RR: 22 (21 Aug 2021 16:15) (12 - 38)  SpO2: 95% (21 Aug 2021 16:15) (93% - 97%)  Gen: intubated, off sedation  CNS: coma  Neck: no JVD  RES : clear , no wheezing                     CVS: Regular  rhythm. Normal S1/S2  Abd: Soft, non-distended. Bowel sounds present.  Skin: No rash.  Ext:  no edema, A Line    ============================I/O===========================   I&O's Detail    20 Aug 2021 07:01  -  21 Aug 2021 07:00  --------------------------------------------------------  IN:    Enteral Tube Flush: 40 mL    NiCARdipine: 1655 mL    Propofol: 495.2 mL    Vital1.0: 1560 mL  Total IN: 3750.2 mL    OUT:    Indwelling Catheter - Urethral (mL): 2475 mL  Total OUT: 2475 mL    Total NET: 1275.2 mL      21 Aug 2021 07:01  -  21 Aug 2021 16:40  --------------------------------------------------------  IN:    Enteral Tube Flush: 50 mL    IV PiggyBack: 100 mL    NiCARdipine: 750 mL    Propofol: 178.8 mL    Vital1.0: 650 mL  Total IN: 1728.8 mL    OUT:    Indwelling Catheter - Urethral (mL): 2050 mL  Total OUT: 2050 mL    Total NET: -321.2 mL        ============================ LABS =========================                        9.7    10.51 )-----------( 285      ( 21 Aug 2021 00:57 )             31.4     08-21    152<H>  |  122<H>  |  27<H>  ----------------------------<  152<H>  4.3   |  19<L>  |  1.47<H>    Ca    9.1      21 Aug 2021 00:57  Phos  3.5     08-21  Mg     2.6     08-21    TPro  6.7  /  Alb  2.9<L>  /  TBili  0.5  /  DBili  x   /  AST  23  /  ALT  22  /  AlkPhos  141<H>  08-21    LIVER FUNCTIONS - ( 21 Aug 2021 00:57 )  Alb: 2.9 g/dL / Pro: 6.7 g/dL / ALK PHOS: 141 U/L / ALT: 22 U/L / AST: 23 U/L / GGT: x             ABG - ( 21 Aug 2021 14:01 )  pH, Arterial: 7.41  pH, Blood: x     /  pCO2: 35    /  pO2: 77    / HCO3: 22    / Base Excess: -2.0  /  SaO2: 97.9                ======================Micro/Rad/Cardio=================  Culture: Reviewed   CXR: Reviewed  Echo:Reviewed  ======================================================  PAST MEDICAL & SURGICAL HISTORY:  No pertinent past medical history    No significant past surgical history      ====================ASSESMENT ==============  Left basal ganglia intraparenchymal hemorrhagic stroke   Type A dissection, Acute Intraparenchymal hematoma   CKD  Leukocytosis     Multiple bilateral  brain infarcts     acute respiratory failure   Plan:  ====================== NEUROLOGY=====================  acetaminophen    Suspension .. 650 milliGRAM(s) Enteral Tube every 6 hours PRN Temp greater or equal to 38C (100.4F)  levETIRAcetam  IVPB 1000 milliGRAM(s) IV Intermittent every 12 hours  propofol Infusion 20 MICROgram(s)/kG/Min (12.8 mL/Hr) IV Continuous <Continuous>    ==================== RESPIRATORY======================  Mechanical Ventilation:  Mode: AC/ CMV (Assist Control/ Continuous Mandatory Ventilation)  RR (machine): 12  TV (machine): 650  FiO2: 50  PEEP: 7  ITime: 1  MAP: 10  PIP: 23      ====================CARDIOVASCULAR==================  amLODIPine   Tablet 10 milliGRAM(s) Oral daily  cloNIDine 0.2 milliGRAM(s) Oral every 8 hours  labetalol 400 milliGRAM(s) Oral every 8 hours  niCARdipine Infusion 2 mG/Hr (10 mL/Hr) IV Continuous <Continuous>    ===================HEMATOLOGIC/ONC ===================  heparin   Injectable 5000 Unit(s) SubCutaneous every 8 hours    ===================== RENAL =========================  Butcher for monitoring urine output    ==================== GASTROINTESTINAL===================  pantoprazole  Injectable 40 milliGRAM(s) IV Push daily    =======================    ENDOCRINE  =====================    ========================INFECTIOUS DISEASE================  piperacillin/tazobactam IVPB.. 3.375 Gram(s) IV Intermittent every 8 hours  vancomycin  IVPB            Patient requires continuous monitoring with:  bedside rhythm monitoring, arterial line, pulse oximetry, ventilator management and monitoring; intermittent blood gas analysis.  Care plan discussed with ICU care team. very poor prognosis  patient remain critical; required more than usual post op care; I have spent 35 minutes providing non routine post op care, revaluated multiple times during the day .             LUCY MACIAS  MRN-[id number]  Patient is a 56y old  Male who presents with a chief complaint of Stroke, left basal ganglia intraparenchymal hemorrhage (20 Aug 2021 19:31)    HPI:  55yo man w/ no known PMH, no medications, presents to ED as transfer from [facility] with left basal ganglia intraparenchymal hemorrhage and incidental finding of Type A dissection.  Patient's brother at bedside and provides history. 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 Pt denies any fall, syncope, trauma although per EMS reports patient had a fall and was found with word finding difficulty and right-sided weakness. At [place], CTH noncon was done and demonstrates left basal ganglia hemorrhage. Patient subsequently transferred to [facility] ED, repeat imaging showed possible Type A aortic dissection. Cardiothoracic surgery consulted, CTA CAP showed Type A dissection from distal ascending aorta to distal arch. Pt denies any chest pain, palpitations, SOB, N/V/D, numbness, tingling in extremities. Pt in [facility] ED on nicardipine gtt. (13 Aug 2021 14:59)    Surgery/Hospital course:   intubated, full vent support, unresponsive   head CT intracranial bleed, watershed infarct    REVIEW OF SYSTEMS:   not obtainable      Physical Exam:  Vital Signs Last 24 Hrs  T(C): 37.9 (21 Aug 2021 16:00), Max: 37.9 (21 Aug 2021 16:00)  T(F): 100.2 (21 Aug 2021 16:00), Max: 100.2 (21 Aug 2021 16:00)  HR: 74 (21 Aug 2021 16:15) (53 - 74)  BP: 154/75 (21 Aug 2021 12:15) (142/78 - 154/75)  BP(mean): 108 (21 Aug 2021 12:15) (103 - 108)  RR: 22 (21 Aug 2021 16:15) (12 - 38)  SpO2: 95% (21 Aug 2021 16:15) (93% - 97%)  Gen: intubated, off sedation  CNS: coma  Neck: no JVD  RES : clear , no wheezing                     CVS: Regular  rhythm. Normal S1/S2  Abd: Soft, non-distended. Bowel sounds present.  Skin: No rash.  Ext:  no edema, A Line    ============================I/O===========================   I&O's Detail    20 Aug 2021 07:01  -  21 Aug 2021 07:00  --------------------------------------------------------  IN:    Enteral Tube Flush: 40 mL    NiCARdipine: 1655 mL    Propofol: 495.2 mL    Vital1.0: 1560 mL  Total IN: 3750.2 mL    OUT:    Indwelling Catheter - Urethral (mL): 2475 mL  Total OUT: 2475 mL    Total NET: 1275.2 mL      21 Aug 2021 07:01  -  21 Aug 2021 16:40  --------------------------------------------------------  IN:    Enteral Tube Flush: 50 mL    IV PiggyBack: 100 mL    NiCARdipine: 750 mL    Propofol: 178.8 mL    Vital1.0: 650 mL  Total IN: 1728.8 mL    OUT:    Indwelling Catheter - Urethral (mL): 2050 mL  Total OUT: 2050 mL    Total NET: -321.2 mL        ============================ LABS =========================                        9.7    10.51 )-----------( 285      ( 21 Aug 2021 00:57 )             31.4     08-21    152<H>  |  122<H>  |  27<H>  ----------------------------<  152<H>  4.3   |  19<L>  |  1.47<H>    Ca    9.1      21 Aug 2021 00:57  Phos  3.5     08-21  Mg     2.6     08-21    TPro  6.7  /  Alb  2.9<L>  /  TBili  0.5  /  DBili  x   /  AST  23  /  ALT  22  /  AlkPhos  141<H>  08-21    LIVER FUNCTIONS - ( 21 Aug 2021 00:57 )  Alb: 2.9 g/dL / Pro: 6.7 g/dL / ALK PHOS: 141 U/L / ALT: 22 U/L / AST: 23 U/L / GGT: x             ABG - ( 21 Aug 2021 14:01 )  pH, Arterial: 7.41  pH, Blood: x     /  pCO2: 35    /  pO2: 77    / HCO3: 22    / Base Excess: -2.0  /  SaO2: 97.9                ======================Micro/Rad/Cardio=================  CXR: Reviewed  Echo:Reviewed  ======================================================  PAST MEDICAL & SURGICAL HISTORY:  No pertinent past medical history  No significant past surgical history  ====================ASSESMENT ==============  Left basal ganglia intraparenchymal hemorrhagic stroke   Type A dissection, Acute Intraparenchymal hematoma   CKD  Leukocytosis   Multiple bilateral  brain infarcts   acute respiratory failure   Plan:  ====================== NEUROLOGY=====================  acetaminophen    Suspension .. 650 milliGRAM(s) Enteral Tube every 6 hours PRN Temp greater or equal to 38C (100.4F)  levETIRAcetam  IVPB 1000 milliGRAM(s) IV Intermittent every 12 hours  propofol Infusion 20 MICROgram(s)/kG/Min (12.8 mL/Hr) IV Continuous <Continuous>    ==================== RESPIRATORY======================  Mechanical Ventilation:  Mode: AC/ CMV (Assist Control/ Continuous Mandatory Ventilation)  RR (machine): 12  TV (machine): 650  FiO2: 50  PEEP: 7  ITime: 1  MAP: 10  PIP: 23      ====================CARDIOVASCULAR==================  amLODIPine   Tablet 10 milliGRAM(s) Oral daily  cloNIDine 0.2 milliGRAM(s) Oral every 8 hours  labetalol 400 milliGRAM(s) Oral every 8 hours  niCARdipine Infusion 2 mG/Hr (10 mL/Hr) IV Continuous <Continuous>    ===================HEMATOLOGIC/ONC ===================  heparin   Injectable 5000 Unit(s) SubCutaneous every 8 hours    ===================== RENAL =========================  Butcher for monitoring urine output    ==================== GASTROINTESTINAL===================  pantoprazole  Injectable 40 milliGRAM(s) IV Push daily    =======================    ENDOCRINE  =====================    ========================INFECTIOUS DISEASE================  piperacillin/tazobactam IVPB.. 3.375 Gram(s) IV Intermittent every 8 hours  vancomycin  IVPB            Patient requires continuous monitoring with:  bedside rhythm monitoring, arterial line, pulse oximetry, ventilator management and monitoring; intermittent blood gas analysis.  Care plan discussed with ICU care team. very poor prognosis  patient remain critical; required more than usual post op care; I have spent 35 minutes providing non routine post op care, revaluated multiple times during the day .

## 2021-08-21 NOTE — PROGRESS NOTE ADULT - SUBJECTIVE AND OBJECTIVE BOX
[facility]   [phone]  RENAL FOLLOW UP NOTE  --------------------------------------------------------------------------------  Chief Complaint:    24 hour events/subjective:    seen earlier intubated     PAST HISTORY  --------------------------------------------------------------------------------  No significant changes to PMH, PSH, FHx, SHx, unless otherwise noted    ALLERGIES & MEDICATIONS  --------------------------------------------------------------------------------  Allergies    No Known Allergies    Intolerances      Standing Inpatient Medications  amLODIPine   Tablet 10 milliGRAM(s) Oral daily  chlorhexidine 0.12% Liquid 15 milliLiter(s) Oral Mucosa every 12 hours  chlorhexidine 2% Cloths 1 Application(s) Topical <User Schedule>  cloNIDine 0.2 milliGRAM(s) Oral every 8 hours  heparin   Injectable 5000 Unit(s) SubCutaneous every 8 hours  labetalol 400 milliGRAM(s) Oral every 8 hours  levETIRAcetam  IVPB 1000 milliGRAM(s) IV Intermittent every 12 hours  niCARdipine Infusion 2 mG/Hr IV Continuous <Continuous>  pantoprazole  Injectable 40 milliGRAM(s) IV Push daily  piperacillin/tazobactam IVPB.. 3.375 Gram(s) IV Intermittent every 8 hours  propofol Infusion 20 MICROgram(s)/kG/Min IV Continuous <Continuous>  vancomycin  IVPB        PRN Inpatient Medications  acetaminophen    Suspension .. 650 milliGRAM(s) Enteral Tube every 6 hours PRN      REVIEW OF SYSTEMS  --------------------------------------------------------------------------------        VITALS/PHYSICAL EXAM  --------------------------------------------------------------------------------  T(C): 38.3 (08-21-21 @ 18:00), Max: 38.3 (08-21-21 @ 18:00)  HR: 69 (08-21-21 @ 19:00) (53 - 76)  BP: 154/75 (08-21-21 @ 12:15) (142/78 - 154/75)  RR: 19 (08-21-21 @ 19:00) (12 - 27)  SpO2: 97% (08-21-21 @ 19:00) (93% - 97%)  Wt(kg): --        08-20-21 @ 07:01  -  08-21-21 @ 07:00  --------------------------------------------------------  IN: 3750.2 mL / OUT: 2475 mL / NET: 1275.2 mL    08-21-21 @ 07:01  -  08-21-21 @ 19:59  --------------------------------------------------------  IN: 2138.8 mL / OUT: 2475 mL / NET: -336.2 mL      Physical Exam:  		  Gen: intubated   	no jvd   	Pulm: decrease bs  no rales or ronchi or wheezing  	CV: RRR, S1S2; no rub  	Abd: +BS, soft, nontender/nondistended  	UE: Warm, no cyanosis  no clubbing,  no edema  	LE: Warm, no cyanosis  no clubbing,  + 1 edema      LABS/STUDIES  --------------------------------------------------------------------------------              9.7    10.51 >-----------<  285      [08-21-21 @ 00:57]              31.4     152  |  122  |  27  ----------------------------<  152      [08-21-21 @ 00:57]  4.3   |  19  |  1.47        Ca     9.1     [08-21-21 @ 00:57]      Mg     2.6     [08-21-21 @ 00:57]      Phos  3.5     [08-21-21 @ 00:57]    TPro  6.7  /  Alb  2.9  /  TBili  0.5  /  DBili  x   /  AST  23  /  ALT  22  /  AlkPhos  141  [08-21-21 @ 00:57]          Creatinine Trend:  SCr 1.47 [08-21 @ 00:57]  SCr 1.57 [08-20 @ 00:35]  SCr 1.60 [08-19 @ 17:56]  SCr 1.73 [08-19 @ 08:33]  SCr 1.75 [08-19 @ 00:39]              Urinalysis - [08-15-21 @ 13:52]      Color Yellow / Appearance Slightly Turbid / SG 1.050 / pH 5.5      Gluc Negative / Ketone Negative  / Bili Negative / Urobili Negative       Blood Moderate / Protein 30 mg/dL / Leuk Est Small / Nitrite Negative      RBC 9 / WBC 13 / Hyaline 106 / Gran  / Sq Epi  / Non Sq Epi 10 / Bacteria Negative      TSH 1.33      [08-13-21 @ 14:52]  Lipid: chol 169, TG 56, HDL 89, LDL --      [08-13-21 @ 13:21]

## 2021-08-22 NOTE — PROGRESS NOTE ADULT - SUBJECTIVE AND OBJECTIVE BOX
LUCY MACIAS  MRN-[id number]  Patient is a 56y old  Male who presents with a chief complaint of Stroke, left basal ganglia intraparenchymal hemorrhage (20 Aug 2021 19:31)    HPI:  57yo man w/ no known PMH, no medications, presents to ED as transfer from [facility] with left basal ganglia intraparenchymal hemorrhage and incidental finding of Type A dissection.  Patient's brother at bedside and provides history. 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 Pt denies any fall, syncope, trauma although per EMS reports patient had a fall and was found with word finding difficulty and right-sided weakness. At [place], CTH noncon was done and demonstrates left basal ganglia hemorrhage. Patient subsequently transferred to [facility] ED, repeat imaging showed possible Type A aortic dissection. Cardiothoracic surgery consulted, CTA CAP showed Type A dissection from distal ascending aorta to distal arch. Pt denies any chest pain, palpitations, SOB, N/V/D, numbness, tingling in extremities. Pt in [facility] ED on nicardipine gtt. (13 Aug 2021 14:59)    Today: Continues to remain off of sedation since yesterday. Klonidine dc'd due to sedative effects. BP control with IV cardene and PO labetalol and norvasc.     REVIEW OF SYSTEMS:  Unable to obtain as patient is intubated     Physical Exam:  ICU Vital Signs Last 24 Hrs  T(C): 37.7 (22 Aug 2021 20:00), Max: 37.9 (22 Aug 2021 04:00)  T(F): 99.9 (22 Aug 2021 20:00), Max: 100.2 (22 Aug 2021 04:00)  HR: 67 (22 Aug 2021 21:27) (57 - 78)  ABP: 151/68 (22 Aug 2021 21:00) (126/57 - 169/66)  ABP(mean): 92 (22 Aug 2021 21:00) (78 - 94)  RR: 14 (22 Aug 2021 21:00) (13 - 24)  SpO2: 98% (22 Aug 2021 21:27) (96% - 100%)    Gen: NAD, no interaction off of sedation   CNS: + corneal reflex  Neck: no JVD  RES : clear , no wheezing. on ventilator                  CVS: Regular rhythm. Normal S1/S2  Abd: Soft, non-distended. Bowel sounds present.  Skin: No rash.  Ext:  no edema, A Line  ============================I/O===========================  I&O's Detail    21 Aug 2021 07:01  -  22 Aug 2021 07:00  --------------------------------------------------------  IN:    Enteral Tube Flush: 80 mL    IV PiggyBack: 350 mL    IV PiggyBack: 100 mL    NiCARdipine: 1635 mL    Propofol: 178.8 mL    Vital1.0: 1560 mL  Total IN: 3903.8 mL    OUT:    Indwelling Catheter - Urethral (mL): 4720 mL  Total OUT: 4720 mL    Total NET: -816.2 mL      22 Aug 2021 07:01  -  22 Aug 2021 21:55  --------------------------------------------------------  IN:    IV PiggyBack: 50 mL    IV PiggyBack: 550 mL    NiCARdipine: 352.5 mL    Vital1.0: 910 mL  Total IN: 1862.5 mL    OUT:    Indwelling Catheter - Urethral (mL): 1980 mL  Total OUT: 1980 mL    Total NET: -117.5 mL      ============================ LABS =========================              CBC Full  -  ( 22 Aug 2021 00:31 )  WBC Count : 11.48 K/uL  RBC Count : 3.20 M/uL  Hemoglobin : 9.7 g/dL  Hematocrit : 31.1 %  Platelet Count - Automated : 312 K/uL  Mean Cell Volume : 97.2 fl  Mean Cell Hemoglobin : 30.3 pg  Mean Cell Hemoglobin Concentration : 31.2 gm/dL  Auto Neutrophil # : x  Auto Lymphocyte # : x  Auto Monocyte # : x  Auto Eosinophil # : x  Auto Basophil # : x  Auto Neutrophil % : x  Auto Lymphocyte % : x  Auto Monocyte % : x  Auto Eosinophil % : x  Auto Basophil % : x    08-22    148<H>  |  116<H>  |  27<H>  ----------------------------<  172<H>  4.0   |  21<L>  |  1.40<H>    Ca    9.2      22 Aug 2021 13:31  Phos  3.9     08-22  Mg     2.5     08-22    TPro  6.2  /  Alb  2.6<L>  /  TBili  0.4  /  DBili  x   /  AST  31  /  ALT  35  /  AlkPhos  126<H>  08-22    ======================Micro/Rad/Cardio=================  Culture: Reviewed   CXR: Reviewed  Echo:Reviewed  ======================================================  PAST MEDICAL & SURGICAL HISTORY:  No pertinent past medical history    No significant past surgical history      ====================ASSESMENT ==============  Left basal ganglia intraparenchymal hemorrhagic stroke   Type A dissection, Acute Intraparenchymal hematoma   CKD  Leukocytosis     ====================== NEUROLOGY=====================  Off IV sedation and sedative meds for complete neurologic evaluation   Continue to be followed by neurosurgery and neurology     acetaminophen    Suspension .. 650 milliGRAM(s) Enteral Tube every 6 hours PRN Temp greater or equal to 38C (100.4F)  levETIRAcetam  IVPB 1000 milliGRAM(s) IV Intermittent every 12 hours    ==================== RESPIRATORY======================  Continue ventilation support   Mechanical Ventilation:  Mode: AC/ CMV (Assist Control/ Continuous Mandatory Ventilation)  RR (machine): 12  TV (machine): 650  FiO2: 50  PEEP: 7  ITime: 1  MAP: 10  PIP: 23    ====================CARDIOVASCULAR==================  Type A dissection  Continue BP management with IV and PO medications, ok for systolics of 140     amLODIPine   Tablet 10 milliGRAM(s) Oral daily  labetalol 400 milliGRAM(s) Oral every 8 hours  niCARdipine Infusion 2 mG/Hr (10 mL/Hr) IV Continuous <Continuous>    ===================HEMATOLOGIC/ONC ===================  DVT PPX with heparin SQ   Monitor H&H and platelets   heparin   Injectable 5000 Unit(s) SubCutaneous every 8 hours    ===================== RENAL =========================  [place] for monitoring urine output    ==================== GASTROINTESTINAL===================  GI Prophylaxis   pantoprazole  Injectable 40 milliGRAM(s) IV Push daily    ========================INFECTIOUS DISEASE================  Empiric coverage, most recent cultures negative  piperacillin/tazobactam IVPB.. 3.375 Gram(s) IV Intermittent every 8 hours  vancomycin  IVPB

## 2021-08-22 NOTE — PROGRESS NOTE ADULT - ASSESSMENT
55yo man w/ no known PMH, no medications, presents to ED as transfer from [facility] with left basal ganglia intraparenchymal hemorrhage and incidental finding of Type A dissection.  Patient's brother at bedside and provides history. 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 At [place], CTH noncon was done and demonstrates left basal ganglia hemorrhage. Patient subsequently transferred to [facility] ED, repeat imaging showed possible Type A aortic dissection. Cardiothoracic surgery consulted, CTA CAP showed Type A dissection from distal ascending aorta to distal arch. Pt in [facility] ED started on  nicardipine gtt.  pt also noticed with cr 2. per brother pt has not seen a primary doc.  pt required intubation for airway protection       1- MILDRED   2- type A aortic dissection   3- ICH   4- HTN     mildred in setting of type A aortic dissection with iv contrast with underlying likely ckd and seems to be ranging towards baseline    cardene drip for bp control  increase labetalol 600 mg q8 clonidine 0.2 mg tid    uo is improving  cr is improving   vent support   na is high but acceptable in setting of ICH and is improving

## 2021-08-22 NOTE — PROGRESS NOTE ADULT - SUBJECTIVE AND OBJECTIVE BOX
Neurology Progress Note    S: Patient seen and examined in [facility].  off sedation for ~24hrs. no pupilliary response noted today. + corenals, no gag Tmax 101.1    Medication:  acetaminophen    Suspension .. 650 milliGRAM(s) Enteral Tube every 6 hours PRN  amLODIPine   Tablet 10 milliGRAM(s) Oral daily  chlorhexidine 0.12% Liquid 15 milliLiter(s) Oral Mucosa every 12 hours  chlorhexidine 2% Cloths 1 Application(s) Topical <User Schedule>  cloNIDine 0.2 milliGRAM(s) Oral every 8 hours  heparin   Injectable 5000 Unit(s) SubCutaneous every 8 hours  labetalol 600 milliGRAM(s) Oral every 8 hours  levETIRAcetam  IVPB 1000 milliGRAM(s) IV Intermittent every 12 hours  niCARdipine Infusion 2 mG/Hr IV Continuous <Continuous>  pantoprazole  Injectable 40 milliGRAM(s) IV Push daily  piperacillin/tazobactam IVPB.. 3.375 Gram(s) IV Intermittent every 8 hours  vancomycin  IVPB      vancomycin  IVPB 1000 milliGRAM(s) IV Intermittent daily      Vitals:  Vital Signs Last 24 Hrs  T(C): 36.9 (22 Aug 2021 08:00), Max: 38.4 (21 Aug 2021 20:00)  T(F): 98.4 (22 Aug 2021 08:00), Max: 101.1 (21 Aug 2021 20:00)  HR: 59 (22 Aug 2021 09:00) (57 - 80)  BP: 154/75 (21 Aug 2021 12:15) (150/72 - 154/75)  BP(mean): 108 (21 Aug 2021 12:15) (103 - 108)  RR: 14 (22 Aug 2021 09:00) (13 - 24)  SpO2: 99% (22 Aug 2021 09:00) (94% - 99%)    General Exam:   General Appearance: Appropriately dressed    Head: Normocephalic, atraumatic and no dysmorphic features  Ear, Nose, and Throat: Moist mucous membranes +ETT  CVS: S1S2+  Resp: on vent   Abd: soft NTND  Extremities: No edema, no cyanosis  Skin: No bruises, no rashes     Neurological Exam: (no sedation ~24hrs)  Mental Status: eyes closed, no verbal, not following, yawning reflexive, REM eye movements on exam    Cranial Nerves: no gag, + OCC, no pupillary response, + corenals.    Motor: no spontaneous movement   Sensation: no posturing or withdrawal to noxious   Coordination: unable   Gait: unable     I personally reviewed the below data/images/labs:      CBC Full  -  ( 22 Aug 2021 00:31 )  WBC Count : 11.48 K/uL  RBC Count : 3.20 M/uL  Hemoglobin : 9.7 g/dL  Hematocrit : 31.1 %  Platelet Count - Automated : 312 K/uL  Mean Cell Volume : 97.2 fl  Mean Cell Hemoglobin : 30.3 pg  Mean Cell Hemoglobin Concentration : 31.2 gm/dL  Auto Neutrophil # : x  Auto Lymphocyte # : x  Auto Monocyte # : x  Auto Eosinophil # : x  Auto Basophil # : x  Auto Neutrophil % : x  Auto Lymphocyte % : x  Auto Monocyte % : x  Auto Eosinophil % : x  Auto Basophil % : x    08-22    150<H>  |  120<H>  |  26<H>  ----------------------------<  151<H>  4.3   |  19<L>  |  1.45<H>    Ca    8.9      22 Aug 2021 00:31  Phos  3.9     08-22  Mg     2.5     08-22    TPro  6.5  /  Alb  2.8<L>  /  TBili  0.5  /  DBili  x   /  AST  37  /  ALT  34  /  AlkPhos  139<H>  08-22    LIVER FUNCTIONS - ( 22 Aug 2021 00:31 )  Alb: 2.8 g/dL / Pro: 6.5 g/dL / ALK PHOS: 139 U/L / ALT: 34 U/L / AST: 37 U/L / GGT: x                 Radiology/EEG:  -08/14 CTH: slightly increased intracranial hemorrhage within the LEFT basal ganglia measuring 5.6 x 2.6 x 3.8 cm.    Small retention cysts in the BILATERAL maxillary sinuses.  -08/14 Repeat CTH: Redemonstration of left basal ganglia hemorrhage measuring 2.3 x 5.7 x 3.3 cm, not significantly changed.  -08/14 CTA NECK AND BRAIN: No vessel occlusion, hemodynamically significant stenosis, dissection, or aneurysm. The smaller left vertebral artery is not well visualized in its entirety. Whether this is due simply to motion is unclear.  -08/15 CTH: Unchanged left basal ganglia hemorrhage with similar mass effect on left lateral ventricle. Redemonstration of layering hemorrhage within the left lateral ventricle. No new intracranial hemorrhage.  -08/16 CTH: No significant interval change in size of the acute intraparenchymal hematoma within the left basal ganglia, currently measuring 5.3 x 2.3 x 3.4 cm. Stable small amount of hemorrhage in the left occipital horn. There is mild surrounding mass effect without appreciable midline shift. Basal cisterns are patent. There are, however, multiple new small hypodensities within the watershed areas of the JEMIMA/MCA territories bilaterally raising concern for acute ischemic infarcts.  -08/16 MRI Head w/o: No significant interval change in size of the acute intraparenchymal hematoma within the left basal ganglia, currently measuring 5.3 x 2.3 x 3.4 cm. Stable small amount of hemorrhage in the left occipital horn. There is mild surrounding mass effect without appreciable midline shift. Basal cisterns are patent. Innumerable small acute infarcts within the watershed areas of the JEMIMA/MCA territories as well as the MCA/PCA territories bilaterally. Single acute lacunar infarct involving the lateral left cerebellum.  -08/19 CTH: Acute parenchymal hemorrhage again seen swelling above. Extensive periventricular white matter lucency is identified and more conspicuous when compared with the prior exam.    -08/17 EEG SUMMARY/CLASSIFICATION  Abnormal EEG in a comatose patient  - LPDs - 1-3 Hz, right frontal-central region (F4 max / Fz)  - Occasional independent left frontal sharp wave discharges (F3 max)  - Intermittent runs of GPDs 0.5-1.5 Hz, at times with triphasic morphology.    - Moderate-severe generalized slowing.  - Irregular heart rhythm noted at times.  _____________________________________________________________  EEG IMPRESSION/CLINICAL CORRELATE  Abnormal EEG study.  - Potential independent epileptogenic foci in the bilateral frontal, central regions (right greater than left) superimposed over diffuse cortical hyperexcitability with GPDs, at times with triphasic morphology suggestive of severe metabolic encephalopathy but can be considered epileptogenic in the correct clinical context.   - Structural or functional abnormality in left > right frontal regions  - Moderate-severe nonspecific diffuse or multifocal cerebral dysfunction.     -08/18 EEG SUMMARY/CLASSIFICATION  Abnormal EEG in a comatose patient  - LPDs - 1-3 Hz, right frontal-central region (F4 max / Fz)  - Occasional independent left frontal sharp wave discharges (F3 max)  - Intermittent runs of GPDs 0.5-1.5 Hz, at times with triphasic morphology.    - Moderate-severe generalized slowing.  - Irregular heart rhythm noted at times.  _____________________________________________________________  EEG IMPRESSION/CLINICAL CORRELAT  Abnormal EEG study.  - Potential independent epileptogenic foci in the bilateral frontal, central regions (right greater than left) superimposed over diffuse cortical hyperexcitability with GPDs, at times with triphasic morphology suggestive of severe metabolic encephalopathy but can be considered epileptogenic in the correct clinical context.   - Structural or functional abnormality in left > right frontal regions  - Moderate-severe nonspecific diffuse or multifocal cerebral dysfunction.     -08/19 EEG SUMMARY/CLASSIFICATION  Abnormal EEG in a comatose patient  - LPDs - 1-3 Hz, right frontal-central region (F4 max / Fz)  - Previously seen left frontal sharp wave discharges are not noted in this record.  - Occasional runs of GPDs 0.5-1.5 Hz, at times with triphasic morphology.    - Moderate-severe generalized slowing, at times in burst suppression pattern.  - Irregular heart rhythm noted at times.  _____________________________________________________________  EEG IMPRESSION/CLINICAL CORRELATE  Abnormal EEG study.  - Potential independent epileptogenic foci in the right frontal, central regions superimposed over diffuse cortical hyperexcitability with GPDs, at times with triphasic morphology suggestive of severe metabolic encephalopathy but can be considered epileptogenic in the correct clinical context.   - Structural or functional abnormality in left > right frontal regions  - Moderate-severe nonspecific diffuse or multifocal cerebral dysfunction.   - No seizure seen x 3 days    -08/20 EEG SUMMARY/CLASSIFICATION  Abnormal EEG in a comatose patient  - LPDs - 1-3 Hz, right frontal-central region (F4 max / Fz)  - Previously seen left frontal sharp wave discharges are not noted in this record.  - Previously seen GPDs 0.5-1.5 Hz with triphasic morphology are not recorded in this study.  - Moderate-severe generalized slowing, at times with breif periods of suppression  _____________________________________________________________  EEG IMPRESSION/CLINICAL CORRELATE  Abnormal EEG study.  - Potential independent epileptogenic foci in the right frontal, central regions  - Relative improvement of background with resolution of GPDs and lessened cortical hyperexcitability  - Structural or functional abnormality in left > right frontal regions  - Moderate-severe nonspecific diffuse or multifocal cerebral dysfunction.   - No seizure seen x 4 days      EEG SUMMARY/CLASSIFICATION    Abnormal EEG in a comatose patient  - Moderate-severe generalized slowing, at times with breif periods of suppression

## 2021-08-22 NOTE — PROGRESS NOTE ADULT - SUBJECTIVE AND OBJECTIVE BOX
[facility]   [phone]  RENAL FOLLOW UP NOTE  --------------------------------------------------------------------------------  Chief Complaint:    24 hour events/subjective:    seen earlier.   intubated     PAST HISTORY  --------------------------------------------------------------------------------  No significant changes to PMH, PSH, FHx, SHx, unless otherwise noted    ALLERGIES & MEDICATIONS  --------------------------------------------------------------------------------  Allergies    No Known Allergies    Intolerances      Standing Inpatient Medications  amLODIPine   Tablet 10 milliGRAM(s) Oral <User Schedule>  atorvastatin 40 milliGRAM(s) Oral at bedtime  chlorhexidine 0.12% Liquid 15 milliLiter(s) Oral Mucosa every 12 hours  chlorhexidine 2% Cloths 1 Application(s) Topical <User Schedule>  heparin   Injectable 5000 Unit(s) SubCutaneous every 8 hours  labetalol 600 milliGRAM(s) Oral every 8 hours  levETIRAcetam  IVPB 1000 milliGRAM(s) IV Intermittent every 12 hours  niCARdipine Infusion 2 mG/Hr IV Continuous <Continuous>  pantoprazole  Injectable 40 milliGRAM(s) IV Push daily  piperacillin/tazobactam IVPB.. 3.375 Gram(s) IV Intermittent every 8 hours  vancomycin  IVPB      vancomycin  IVPB 1000 milliGRAM(s) IV Intermittent daily    PRN Inpatient Medications  acetaminophen    Suspension .. 650 milliGRAM(s) Enteral Tube every 6 hours PRN      REVIEW OF SYSTEMS  --------------------------------------------------------------------------------        VITALS/PHYSICAL EXAM  --------------------------------------------------------------------------------  T(C): 37.3 (08-22-21 @ 12:00), Max: 38.4 (08-21-21 @ 20:00)  HR: 63 (08-22-21 @ 15:00) (57 - 80)  BP: --  RR: 15 (08-22-21 @ 15:00) (13 - 24)  SpO2: 98% (08-22-21 @ 15:00) (95% - 100%)  Wt(kg): --        08-21-21 @ 07:01  -  08-22-21 @ 07:00  --------------------------------------------------------  IN: 3903.8 mL / OUT: 4720 mL / NET: -816.2 mL    08-22-21 @ 07:01  -  08-22-21 @ 16:21  --------------------------------------------------------  IN: 1085 mL / OUT: 1435 mL / NET: -350 mL      Physical Exam:  	    Gen: intubated   	no jvd   	Pulm: decrease bs  no rales or ronchi or wheezing  	CV: RRR, S1S2; no rub  	Abd: +BS, soft, nontender/nondistended  	UE: Warm, no cyanosis  no clubbing,  no edema  	LE: Warm, no cyanosis  no clubbing,  + 1 edema      LABS/STUDIES  --------------------------------------------------------------------------------              9.7    11.48 >-----------<  312      [08-22-21 @ 00:31]              31.1     148  |  116  |  27  ----------------------------<  172      [08-22-21 @ 13:31]  4.0   |  21  |  1.40        Ca     9.2     [08-22-21 @ 13:31]      Mg     2.5     [08-22-21 @ 00:31]      Phos  3.9     [08-22-21 @ 00:31]    TPro  6.2  /  Alb  2.6  /  TBili  0.4  /  DBili  x   /  AST  31  /  ALT  35  /  AlkPhos  126  [08-22-21 @ 13:31]        Serum Osmolality 319      [08-22-21 @ 10:58]    Creatinine Trend:  SCr 1.40 [08-22 @ 13:31]  SCr 1.45 [08-22 @ 00:31]  SCr 1.47 [08-21 @ 00:57]  SCr 1.57 [08-20 @ 00:35]  SCr 1.60 [08-19 @ 17:56]              Urinalysis - [08-22-21 @ 10:58]      Color  / Appearance  / SG  / pH 5.5      Gluc  / Ketone   / Bili  / Urobili        Blood  / Protein  / Leuk Est  / Nitrite       RBC  / WBC  / Hyaline  / Gran  / Sq Epi  / Non Sq Epi  / Bacteria     Urine Creatinine 50      [08-22-21 @ 10:58]  Urine Sodium 154      [08-22-21 @ 10:58]  Urine Osmolality 590      [08-22-21 @ 10:58]    TSH 1.33      [08-13-21 @ 14:52]  Lipid: chol 169, TG 56, HDL 89, LDL --      [08-13-21 @ 13:21]

## 2021-08-22 NOTE — PROGRESS NOTE ADULT - ASSESSMENT
55 yo RH man with  chronic pain (takes Advil and Aleve) and ?EtOH abuse/substance abuse who presented as a transfer from [facility] on 8/13 for L BG hemorrhage.   MRI head shows L BG IPH + innumerable small acute infarcts within the watershed areas of the JEMIMA/MCA territories as well as the MCA/PCA territories bilaterally.   EEG wit no seizures but now with burst suppression  A1c 5.3, LDL 68   Impression: L BG hemorrhage with IVH likely 2/2 HTN vs alternate etiology; MRI head also shows innumerable bilateral watershed territory acute ischemic infarcts, likely secondary to aortic dissection.    8/22 - worsening exam, no spontaneous movement, no pupillary reflex, no gag, + corenals, + OCC, and eeg with burst suppresion off sedation ~24hrs     - c/w keppra 1g BID. can lower to 750mg BID   - hold AC/AP  - lipitor 40 when able   - at this point given exam today which has progressed and eeg with burst suppression now off sedation for ~24hrs, patient has low likelihood of meaningful recovery. He like likely require trach/peg and 24hrs nursing care at this point and likely be bed bound vent dependent.    - would speak to family regarding GOC  - spoke with ICU team ACP  - BP parameters per ICU   - check FS, glucose control <180  - GI/DVT ppx  - Counseling on diet, exercise, and medication adherence was done  - Counseling on smoking cessation and alcohol consumption offered when appropriate.  - Pain assessed and judicious use of narcotics when appropriate was discussed.    - Stroke education given when appropriate.  - Importance of fall prevention discussed.   - Differential diagnosis and plan of care discussed with patient and/or family and primary team  - Thank you for allowing me to participate in the care of this patient. Call with questions.   Carlos Sierra MD  Vascular Neurology

## 2021-08-23 NOTE — PROGRESS NOTE ADULT - SUBJECTIVE AND OBJECTIVE BOX
[facility]   [phone]  RENAL FOLLOW UP NOTE  --------------------------------------------------------------------------------  Chief Complaint:    24 hour events/subjective:    seen earlier   intubated     PAST HISTORY  --------------------------------------------------------------------------------  No significant changes to PMH, PSH, FHx, SHx, unless otherwise noted    ALLERGIES & MEDICATIONS  --------------------------------------------------------------------------------  Allergies    No Known Allergies    Intolerances      Standing Inpatient Medications  amLODIPine   Tablet 10 milliGRAM(s) Oral <User Schedule>  atorvastatin 40 milliGRAM(s) Oral at bedtime  chlorhexidine 0.12% Liquid 15 milliLiter(s) Oral Mucosa every 12 hours  chlorhexidine 2% Cloths 1 Application(s) Topical <User Schedule>  fentaNYL   Infusion.. 1 MICROgram(s)/kG/Hr IV Continuous <Continuous>  heparin   Injectable 5000 Unit(s) SubCutaneous every 8 hours  labetalol 600 milliGRAM(s) Oral every 8 hours  levETIRAcetam  IVPB 750 milliGRAM(s) IV Intermittent every 12 hours  niCARdipine Infusion 2 mG/Hr IV Continuous <Continuous>  pantoprazole  Injectable 40 milliGRAM(s) IV Push daily  petrolatum Ophthalmic Ointment 1 Application(s) Both EYES two times a day  piperacillin/tazobactam IVPB.. 3.375 Gram(s) IV Intermittent every 8 hours  vancomycin  IVPB      vancomycin  IVPB 1000 milliGRAM(s) IV Intermittent daily    PRN Inpatient Medications  acetaminophen    Suspension .. 650 milliGRAM(s) Enteral Tube every 6 hours PRN      REVIEW OF SYSTEMS  --------------------------------------------------------------------------------      VITALS/PHYSICAL EXAM  --------------------------------------------------------------------------------  T(C): 35.8 (08-23-21 @ 16:00), Max: 38.3 (08-23-21 @ 04:00)  HR: 57 (08-23-21 @ 19:30) (56 - 73)  BP: --  RR: 12 (08-23-21 @ 19:30) (12 - 18)  SpO2: 96% (08-23-21 @ 19:30) (93% - 98%)  Wt(kg): --        08-22-21 @ 07:01  -  08-23-21 @ 07:00  --------------------------------------------------------  IN: 3247.5 mL / OUT: 2895 mL / NET: 352.5 mL    08-23-21 @ 07:01  -  08-23-21 @ 20:15  --------------------------------------------------------  IN: 1681.2 mL / OUT: 840 mL / NET: 841.2 mL      Physical Exam:  	    Gen: intubated   	no jvd   	Pulm: decrease bs  no rales or ronchi or wheezing  	CV: RRR, S1S2; no rub  	Abd: +BS, soft, nontender/nondistended  	UE: Warm, no cyanosis  no clubbing,  no edema  	LE: Warm, no cyanosis  no clubbing,  + 1 edema      LABS/STUDIES  --------------------------------------------------------------------------------              10.0   10.49 >-----------<  318      [08-23-21 @ 00:37]              32.5     149  |  117  |  29  ----------------------------<  158      [08-23-21 @ 00:37]  4.2   |  21  |  1.40        Ca     9.1     [08-23-21 @ 00:37]      Mg     2.5     [08-23-21 @ 00:37]      Phos  3.5     [08-23-21 @ 00:37]    TPro  6.3  /  Alb  2.5  /  TBili  0.4  /  DBili  x   /  AST  35  /  ALT  43  /  AlkPhos  126  [08-23-21 @ 00:37]        Serum Osmolality 319      [08-22-21 @ 10:58]    Creatinine Trend:  SCr 1.40 [08-23 @ 00:37]  SCr 1.40 [08-22 @ 13:31]  SCr 1.45 [08-22 @ 00:31]  SCr 1.47 [08-21 @ 00:57]  SCr 1.57 [08-20 @ 00:35]              Urinalysis - [08-22-21 @ 10:58]      Color  / Appearance  / SG  / pH 5.5      Gluc  / Ketone   / Bili  / Urobili        Blood  / Protein  / Leuk Est  / Nitrite       RBC  / WBC  / Hyaline  / Gran  / Sq Epi  / Non Sq Epi  / Bacteria     Urine Creatinine 50      [08-22-21 @ 10:58]  Urine Sodium 154      [08-22-21 @ 10:58]  Urine Osmolality 590      [08-22-21 @ 10:58]    TSH 1.33      [08-13-21 @ 14:52]  Lipid: chol 169, TG 56, HDL 89, LDL --      [08-13-21 @ 13:21]

## 2021-08-23 NOTE — PROGRESS NOTE ADULT - ASSESSMENT
55yo man w/ no known PMH, no medications, presents to ED as transfer from [facility] with left basal ganglia intraparenchymal hemorrhage and incidental finding of Type A dissection.  Patient's brother at bedside and provides history. 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 At [place], CTH noncon was done and demonstrates left basal ganglia hemorrhage. Patient subsequently transferred to [facility] ED, repeat imaging showed possible Type A aortic dissection. Cardiothoracic surgery consulted, CTA CAP showed Type A dissection from distal ascending aorta to distal arch. Pt in [facility] ED started on  nicardipine gtt.  pt also noticed with cr 2. per brother pt has not seen a primary doc.  pt required intubation for airway protection       1- MILDRED   2- type A aortic dissection   3- ICH   4- HTN     mildred in setting of type A aortic dissection with iv contrast with underlying likely ckd and seems to be ranging towards baseline    cardene drip for bp control  norvasc 10 mg qd labetalol 600 mg q8 clonidine 0.2 mg tid    uo  non oliguric   cr is improving  and now steady at this low level   vent support   na is high but acceptable in setting of ICH and is improving   d/w CTU team when seen earlier

## 2021-08-23 NOTE — PROGRESS NOTE ADULT - SUBJECTIVE AND OBJECTIVE BOX
Neurology Progress Note    S: Patient seen and examined in CTICU. poor prognosis. family bedside     Medication:  MEDICATIONS  (STANDING):  amLODIPine   Tablet 10 milliGRAM(s) Oral <User Schedule>  atorvastatin 40 milliGRAM(s) Oral at bedtime  chlorhexidine 0.12% Liquid 15 milliLiter(s) Oral Mucosa every 12 hours  chlorhexidine 2% Cloths 1 Application(s) Topical <User Schedule>  heparin   Injectable 5000 Unit(s) SubCutaneous every 8 hours  labetalol 600 milliGRAM(s) Oral every 8 hours  levETIRAcetam  IVPB 750 milliGRAM(s) IV Intermittent every 12 hours  niCARdipine Infusion 2 mG/Hr (10 mL/Hr) IV Continuous <Continuous>  pantoprazole  Injectable 40 milliGRAM(s) IV Push daily  petrolatum Ophthalmic Ointment 1 Application(s) Both EYES two times a day  piperacillin/tazobactam IVPB.. 3.375 Gram(s) IV Intermittent every 8 hours  vancomycin  IVPB      vancomycin  IVPB 1000 milliGRAM(s) IV Intermittent daily    MEDICATIONS  (PRN):  acetaminophen    Suspension .. 650 milliGRAM(s) Enteral Tube every 6 hours PRN Temp greater or equal to 38C (100.4F)    Vitals:  Vital Signs Last 24 Hrs  ICU Vital Signs Last 24 Hrs  T(C): 37.2 (23 Aug 2021 08:00), Max: 38.3 (23 Aug 2021 04:00)  T(F): 99 (23 Aug 2021 08:00), Max: 100.9 (23 Aug 2021 04:00)  HR: 60 (23 Aug 2021 11:00) (59 - 73)  BP: --  BP(mean): --  ABP: 130/55 (23 Aug 2021 11:00) (130/55 - 162/68)  ABP(mean): 75 (23 Aug 2021 11:00) (75 - 100)  RR: 12 (23 Aug 2021 11:00) (12 - 18)  SpO2: 95% (23 Aug 2021 11:00) (93% - 100%)    General Exam:   General Appearance: Appropriately dressed    Head: Normocephalic, atraumatic and no dysmorphic features  Ear, Nose, and Throat: Moist mucous membranes +ETT  CVS: S1S2+  Resp: on vent   Abd: soft NTND  Extremities: No edema, no cyanosis  Skin: No bruises, no rashes     Neurological Exam: (no sedation ~48hrs)  Mental Status: eyes closed, no verbal, not following, yawning reflexive, REM eye movements on exam    Cranial Nerves: no gag, + OCC, no pupillary response, + corenals.    Motor: no spontaneous movement   Sensation: no posturing or withdrawal to noxious   Coordination: unable   Gait: unable     I personally reviewed the below data/images/labs:        CBC Full  -  ( 23 Aug 2021 00:37 )  WBC Count : 10.49 K/uL  RBC Count : 3.32 M/uL  Hemoglobin : 10.0 g/dL  Hematocrit : 32.5 %  Platelet Count - Automated : 318 K/uL  Mean Cell Volume : 97.9 fl  Mean Cell Hemoglobin : 30.1 pg  Mean Cell Hemoglobin Concentration : 30.8 gm/dL  Auto Neutrophil # : x  Auto Lymphocyte # : x  Auto Monocyte # : x  Auto Eosinophil # : x  Auto Basophil # : x  Auto Neutrophil % : x  Auto Lymphocyte % : x  Auto Monocyte % : x  Auto Eosinophil % : x  Auto Basophil % : x    08-23    149<H>  |  117<H>  |  29<H>  ----------------------------<  158<H>  4.2   |  21<L>  |  1.40<H>    Ca    9.1      23 Aug 2021 00:37  Phos  3.5     08-23  Mg     2.5     08-23    TPro  6.3  /  Alb  2.5<L>  /  TBili  0.4  /  DBili  x   /  AST  35  /  ALT  43  /  AlkPhos  126<H>  08-23      Radiology/EEG:  -08/14 CTH: slightly increased intracranial hemorrhage within the LEFT basal ganglia measuring 5.6 x 2.6 x 3.8 cm.    Small retention cysts in the BILATERAL maxillary sinuses.  -08/14 Repeat CTH: Redemonstration of left basal ganglia hemorrhage measuring 2.3 x 5.7 x 3.3 cm, not significantly changed.  -08/14 CTA NECK AND BRAIN: No vessel occlusion, hemodynamically significant stenosis, dissection, or aneurysm. The smaller left vertebral artery is not well visualized in its entirety. Whether this is due simply to motion is unclear.  -08/15 CTH: Unchanged left basal ganglia hemorrhage with similar mass effect on left lateral ventricle. Redemonstration of layering hemorrhage within the left lateral ventricle. No new intracranial hemorrhage.  -08/16 CTH: No significant interval change in size of the acute intraparenchymal hematoma within the left basal ganglia, currently measuring 5.3 x 2.3 x 3.4 cm. Stable small amount of hemorrhage in the left occipital horn. There is mild surrounding mass effect without appreciable midline shift. Basal cisterns are patent. There are, however, multiple new small hypodensities within the watershed areas of the JEMIMA/MCA territories bilaterally raising concern for acute ischemic infarcts.  -08/16 MRI Head w/o: No significant interval change in size of the acute intraparenchymal hematoma within the left basal ganglia, currently measuring 5.3 x 2.3 x 3.4 cm. Stable small amount of hemorrhage in the left occipital horn. There is mild surrounding mass effect without appreciable midline shift. Basal cisterns are patent. Innumerable small acute infarcts within the watershed areas of the JEMIMA/MCA territories as well as the MCA/PCA territories bilaterally. Single acute lacunar infarct involving the lateral left cerebellum.  -08/19 CTH: Acute parenchymal hemorrhage again seen swelling above. Extensive periventricular white matter lucency is identified and more conspicuous when compared with the prior exam.    -08/17 EEG SUMMARY/CLASSIFICATION  Abnormal EEG in a comatose patient  - LPDs - 1-3 Hz, right frontal-central region (F4 max / Fz)  - Occasional independent left frontal sharp wave discharges (F3 max)  - Intermittent runs of GPDs 0.5-1.5 Hz, at times with triphasic morphology.    - Moderate-severe generalized slowing.  - Irregular heart rhythm noted at times.  _____________________________________________________________  EEG IMPRESSION/CLINICAL CORRELATE  Abnormal EEG study.  - Potential independent epileptogenic foci in the bilateral frontal, central regions (right greater than left) superimposed over diffuse cortical hyperexcitability with GPDs, at times with triphasic morphology suggestive of severe metabolic encephalopathy but can be considered epileptogenic in the correct clinical context.   - Structural or functional abnormality in left > right frontal regions  - Moderate-severe nonspecific diffuse or multifocal cerebral dysfunction.     -08/18 EEG SUMMARY/CLASSIFICATION  Abnormal EEG in a comatose patient  - LPDs - 1-3 Hz, right frontal-central region (F4 max / Fz)  - Occasional independent left frontal sharp wave discharges (F3 max)  - Intermittent runs of GPDs 0.5-1.5 Hz, at times with triphasic morphology.    - Moderate-severe generalized slowing.  - Irregular heart rhythm noted at times.  _____________________________________________________________  EEG IMPRESSION/CLINICAL CORRELAT  Abnormal EEG study.  - Potential independent epileptogenic foci in the bilateral frontal, central regions (right greater than left) superimposed over diffuse cortical hyperexcitability with GPDs, at times with triphasic morphology suggestive of severe metabolic encephalopathy but can be considered epileptogenic in the correct clinical context.   - Structural or functional abnormality in left > right frontal regions  - Moderate-severe nonspecific diffuse or multifocal cerebral dysfunction.     -08/19 EEG SUMMARY/CLASSIFICATION  Abnormal EEG in a comatose patient  - LPDs - 1-3 Hz, right frontal-central region (F4 max / Fz)  - Previously seen left frontal sharp wave discharges are not noted in this record.  - Occasional runs of GPDs 0.5-1.5 Hz, at times with triphasic morphology.    - Moderate-severe generalized slowing, at times in burst suppression pattern.  - Irregular heart rhythm noted at times.  _____________________________________________________________  EEG IMPRESSION/CLINICAL CORRELATE  Abnormal EEG study.  - Potential independent epileptogenic foci in the right frontal, central regions superimposed over diffuse cortical hyperexcitability with GPDs, at times with triphasic morphology suggestive of severe metabolic encephalopathy but can be considered epileptogenic in the correct clinical context.   - Structural or functional abnormality in left > right frontal regions  - Moderate-severe nonspecific diffuse or multifocal cerebral dysfunction.   - No seizure seen x 3 days    -08/20 EEG SUMMARY/CLASSIFICATION  Abnormal EEG in a comatose patient  - LPDs - 1-3 Hz, right frontal-central region (F4 max / Fz)  - Previously seen left frontal sharp wave discharges are not noted in this record.  - Previously seen GPDs 0.5-1.5 Hz with triphasic morphology are not recorded in this study.  - Moderate-severe generalized slowing, at times with breif periods of suppression  _____________________________________________________________  EEG IMPRESSION/CLINICAL CORRELATE  Abnormal EEG study.  - Potential independent epileptogenic foci in the right frontal, central regions  - Relative improvement of background with resolution of GPDs and lessened cortical hyperexcitability  - Structural or functional abnormality in left > right frontal regions  - Moderate-severe nonspecific diffuse or multifocal cerebral dysfunction.   - No seizure seen x 4 days      EEG SUMMARY/CLASSIFICATION    Abnormal EEG in a comatose patient  - Moderate-severe generalized slowing, at times with breif periods of suppression

## 2021-08-23 NOTE — CHART NOTE - NSCHARTNOTEFT_GEN_A_CORE
patient is now DNR with MOLST form filled out and no further escalation in care as per family wishes.  No adding of pressors or inotropes.

## 2021-08-23 NOTE — PROGRESS NOTE ADULT - SUBJECTIVE AND OBJECTIVE BOX
LUCY MACIAS  MRN-[id number]  Patient is a 56y old  Male who presents with a chief complaint of Stroke, left basal ganglia intraparenchymal hemorrhage (22 Aug 2021 21:46)      HPI:  57yo man w/ no known PMH, no medications, presents to ED as transfer from [facility] with left basal ganglia intraparenchymal hemorrhage and incidental finding of Type A dissection.  Patient's brother at bedside and provides history. 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 Pt denies any fall, syncope, trauma although per EMS reports patient had a fall and was found with word finding difficulty and right-sided weakness. At [place], CTH noncon was done and demonstrates left basal ganglia hemorrhage. Patient subsequently transferred to [facility] ED, repeat imaging showed possible Type A aortic dissection. Cardiothoracic surgery consulted, CTA CAP showed Type A dissection from distal ascending aorta to distal arch. Pt denies any chest pain, palpitations, SOB, N/V/D, numbness, tingling in extremities. Pt in [facility] ED on nicardipine gtt. (13 Aug 2021 14:59)      Surgery/Hospital Course:  8/13  Transferred to [facility]; Type A dissection, Acute Intraparenchymal hematoma w/ L basal ganglia, small IVH; ECHO: mild AI and mild MR, ao root minimally dilated 3.9, normal EF and nml RV function    8/14 intubated, CT head slight increase, repeat CT chest unchanged   8/15 CTH unchanged, febrile ->pancultured   8/16 CTH - mild mass effects, mult new small hypodensities w/l watershed area c/f acute ischemic infarcts, EEG prelim no seizures, MRI no sig changed in intraparenchymal hematoma, stable L occipital hemorrhage, mild mass effect w/p shift, innumerable small acute infarcts w/l watershed area of JEMIMA/MCA/MCA/PCA bilaterally, single acute lacunar infarct lateral left cerebellum   8/19 EEG no seizure for 3 days, multifocal cerebral dysfunction   8/19 CTH (no change)/ CT chest (new LLL PNA)     Today:  No acute events     ICU Vital Signs Last 24 Hrs  T(C): 38.3 (23 Aug 2021 04:00), Max: 38.3 (23 Aug 2021 04:00)  T(F): 100.9 (23 Aug 2021 04:00), Max: 100.9 (23 Aug 2021 04:00)  HR: 65 (23 Aug 2021 07:00) (59 - 73)  BP: --  BP(mean): --  ABP: 143/58 (23 Aug 2021 07:00) (138/62 - 162/68)  ABP(mean): 78 (23 Aug 2021 07:00) (77 - 100)  RR: 13 (23 Aug 2021 07:00) (13 - 18)  SpO2: 95% (23 Aug 2021 07:00) (93% - 100%)      Physical Exam:  Gen:  intubated   CNS:   Neck: no JVD  RES : clear , no wheezing              CVS: Regular  rhythm. Normal S1/S2  Abd: Soft, non-distended. Bowel sounds present.  Skin: No rash.  Ext:  no edema    ============================I/O===========================   I&O's Detail    22 Aug 2021 07:01  -  23 Aug 2021 07:00  --------------------------------------------------------  IN:    IV PiggyBack: 650 mL    IV PiggyBack: 150 mL    NiCARdipine: 887.5 mL    Vital1.0: 1560 mL  Total IN: 3247.5 mL    OUT:    Indwelling Catheter - Urethral (mL): 2895 mL  Total OUT: 2895 mL    Total NET: 352.5 mL        ============================ LABS =========================                        10.0   10.49 )-----------( 318      ( 23 Aug 2021 00:37 )             32.5     08-23    149<H>  |  117<H>  |  29<H>  ----------------------------<  158<H>  4.2   |  21<L>  |  1.40<H>    Ca    9.1      23 Aug 2021 00:37  Phos  3.5     08-23  Mg     2.5     08-23    TPro  6.3  /  Alb  2.5<L>  /  TBili  0.4  /  DBili  x   /  AST  35  /  ALT  43  /  AlkPhos  126<H>  08-23    LIVER FUNCTIONS - ( 23 Aug 2021 00:37 )  Alb: 2.5 g/dL / Pro: 6.3 g/dL / ALK PHOS: 126 U/L / ALT: 43 U/L / AST: 35 U/L / GGT: x             ABG - ( 23 Aug 2021 00:18 )  pH, Arterial: 7.39  pH, Blood: x     /  pCO2: 42    /  pO2: 82    / HCO3: 25    / Base Excess: 0.3   /  SaO2: 98.4                ======================Micro/Rad/Cardio=================  Culture: Reviewed   CXR: Reviewed  Echo:Reviewed  ======================================================  PAST MEDICAL & SURGICAL HISTORY:  No pertinent past medical history    No significant past surgical history      ====================ASSESSMENT ==============  Left basal ganglia intraparenchymal hemorrhagic stroke   Type A dissection, Acute Intraparenchymal hematoma   CKD    Plan:  ====================== NEUROLOGY=====================  L BG hemorrhage with IVH likely 2/2 HTN vs alternate etiology as per neurology   MRI head also shows innumerable bilateral watershed territory acute ischemic infarcts, likely secondary to aortic dissection  EEG 8/19 no seizures, multifocal cerebral dysfunction  Continue to monitor neuro status closely   Tylenol PRN for fevers   C/w Keppra for prophylaxis     acetaminophen    Suspension .. 650 milliGRAM(s) Enteral Tube every 6 hours PRN Temp greater or equal to 38C (100.4F)  levETIRAcetam  IVPB 750 milliGRAM(s) IV Intermittent every 12 hours    ==================== RESPIRATORY======================  Respiratory status required full ventilatory support, close monitoring of respiratory rate and breathing pattern, the following of ABG’s with A-line monitoring, continuous pulse oximetry monitoring     Mechanical Ventilation:  Mode: AC/ CMV (Assist Control/ Continuous Mandatory Ventilation)  RR (machine): 12  TV (machine): 650  FiO2: 50  PEEP: 7  ITime: 1  MAP: 11  PIP: 25      ====================CARDIOVASCULAR==================  Type A dissection, not a candidate for surgical intervention due to stroke   Blood pressure support with IV Cardene, Labetalol, and Norvasc for HTN  Continue invasive hemodynamic monitoring   Lipitor for CAD     atorvastatin 40 milliGRAM(s) Oral at bedtime  amLODIPine   Tablet 10 milliGRAM(s) Oral <User Schedule>  labetalol 600 milliGRAM(s) Oral every 8 hours  niCARdipine Infusion 2 mG/Hr (10 mL/Hr) IV Continuous <Continuous>    ===================HEMATOLOGIC/ONC ===================  Monitor H&H/Plts      VTE prophylaxis, heparin   Injectable 5000 Unit(s) SubCutaneous every 8 hours    ===================== RENAL =========================  MILDRED with underlying CKD, renal following   Continue monitoring I&Os, BUN/Cr, and urine output   Replete lytes PRN. Keep K> 4 and Mg >2      ==================== GASTROINTESTINAL===================  Tolerating tube feeds, Vital AF @ 65cc/hr     GI prophylaxis, pantoprazole  Injectable 40 milliGRAM(s) IV Push daily    =======================    ENDOCRINE  =====================  Continue monitoring blood glucose closely for need to initiate sliding scale     ========================INFECTIOUS DISEASE================  Temp 100.9F, WBC downtrending 11.48->10.49  Continue trending WBC and monitoring fever curve   SCx on 8/15: +Staphylococcus aureus, c/w Zosyn and Vancomycin     piperacillin/tazobactam IVPB.. 3.375 Gram(s) IV Intermittent every 8 hours  vancomycin  IVPB 1000 milliGRAM(s) IV Intermittent daily          I have spent 35 minutes providing acute care for this critically ill patient     Patient requires continuous monitoring with bedside rhythm monitoring, pulse ox monitoring, and intermittent blood gas analysis. Care plan discussed with ICU care team. Patient remained critical and at risk for life threatening decompensation.     By signing my name below, I, Hannah Coronado, attest that this documentation has been prepared under the direction and in the presence of Jarvis Calloway MD   Electronically signed: Shannon Buchanan, 08-23-21 @ 07:49    I, Jarvis Calloway, personally performed the services described in this documentation. all medical record entries made by the [email or web address] were at my direction and in my presence. I have reviewed the chart and agree that the record reflects my personal performance and is accurate and complete  Electronically signed: Jarvis Calloway MD        LUCY MACIAS  MRN-[id number]  Patient is a 56y old  Male who presents with a chief complaint of Stroke, left basal ganglia intraparenchymal hemorrhage (22 Aug 2021 21:46)      HPI:  57yo man w/ no known PMH, no medications, presents to ED as transfer from [facility] with left basal ganglia intraparenchymal hemorrhage and incidental finding of Type A dissection.  Patient's brother at bedside and provides history. 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 Pt denies any fall, syncope, trauma although per EMS reports patient had a fall and was found with word finding difficulty and right-sided weakness. At [place], CTH noncon was done and demonstrates left basal ganglia hemorrhage. Patient subsequently transferred to [facility] ED, repeat imaging showed possible Type A aortic dissection. Cardiothoracic surgery consulted, CTA CAP showed Type A dissection from distal ascending aorta to distal arch. Pt denies any chest pain, palpitations, SOB, N/V/D, numbness, tingling in extremities. Pt in [facility] ED on nicardipine gtt. (13 Aug 2021 14:59)      Surgery/Hospital Course:  8/13  Transferred to [facility]; Type A dissection, Acute Intraparenchymal hematoma w/ L basal ganglia, small IVH; ECHO: mild AI and mild MR, ao root minimally dilated 3.9, normal EF and nml RV function    8/14 intubated, CT head slight increase, repeat CT chest unchanged   8/15 CTH unchanged, febrile ->pancultured   8/16 CTH - mild mass effects, mult new small hypodensities w/l watershed area c/f acute ischemic infarcts, EEG prelim no seizures, MRI no sig changed in intraparenchymal hematoma, stable L occipital hemorrhage, mild mass effect w/p shift, innumerable small acute infarcts w/l watershed area of JEMIMA/MCA/MCA/PCA bilaterally, single acute lacunar infarct lateral left cerebellum   8/19 EEG no seizure for 3 days, multifocal cerebral dysfunction   8/19 CTH (no change)/ CT chest (new LLL PNA)     Today:  Continue blood pressure management with IV Cardene, Labetalol, and Norvasc for HTN, SBP goal 140s. Maintain on full vent support.     ICU Vital Signs Last 24 Hrs  T(C): 38.3 (23 Aug 2021 04:00), Max: 38.3 (23 Aug 2021 04:00)  T(F): 100.9 (23 Aug 2021 04:00), Max: 100.9 (23 Aug 2021 04:00)  HR: 65 (23 Aug 2021 07:00) (59 - 73)  BP: --  BP(mean): --  ABP: 143/58 (23 Aug 2021 07:00) (138/62 - 162/68)  ABP(mean): 78 (23 Aug 2021 07:00) (77 - 100)  RR: 13 (23 Aug 2021 07:00) (13 - 18)  SpO2: 95% (23 Aug 2021 07:00) (93% - 100%)      Physical Exam:  Gen:  intubated   CNS: coma   Neck: no JVD  RES : clear , no wheezing              CVS: Regular  rhythm. Normal S1/S2  Abd: Soft, non-distended. Bowel sounds present.  Skin: No rash.  Ext:  no edema    ============================I/O===========================   I&O's Detail    22 Aug 2021 07:01  -  23 Aug 2021 07:00  --------------------------------------------------------  IN:    IV PiggyBack: 650 mL    IV PiggyBack: 150 mL    NiCARdipine: 887.5 mL    Vital1.0: 1560 mL  Total IN: 3247.5 mL    OUT:    Indwelling Catheter - Urethral (mL): 2895 mL  Total OUT: 2895 mL    Total NET: 352.5 mL        ============================ LABS =========================                        10.0   10.49 )-----------( 318      ( 23 Aug 2021 00:37 )             32.5     08-23    149<H>  |  117<H>  |  29<H>  ----------------------------<  158<H>  4.2   |  21<L>  |  1.40<H>    Ca    9.1      23 Aug 2021 00:37  Phos  3.5     08-23  Mg     2.5     08-23    TPro  6.3  /  Alb  2.5<L>  /  TBili  0.4  /  DBili  x   /  AST  35  /  ALT  43  /  AlkPhos  126<H>  08-23    LIVER FUNCTIONS - ( 23 Aug 2021 00:37 )  Alb: 2.5 g/dL / Pro: 6.3 g/dL / ALK PHOS: 126 U/L / ALT: 43 U/L / AST: 35 U/L / GGT: x             ABG - ( 23 Aug 2021 00:18 )  pH, Arterial: 7.39  pH, Blood: x     /  pCO2: 42    /  pO2: 82    / HCO3: 25    / Base Excess: 0.3   /  SaO2: 98.4                ======================Micro/Rad/Cardio=================  Culture: Reviewed   CXR: Reviewed  Echo:Reviewed  ======================================================  PAST MEDICAL & SURGICAL HISTORY:  No pertinent past medical history    No significant past surgical history      ====================ASSESSMENT ==============  Left basal ganglia intraparenchymal hemorrhagic stroke   Type A dissection, Acute Intraparenchymal hematoma   CKD    Plan:  ====================== NEUROLOGY=====================  L BG hemorrhage with IVH likely 2/2 HTN vs alternate etiology as per neurology   MRI head also shows innumerable bilateral watershed territory acute ischemic infarcts, likely secondary to aortic dissection  EEG 8/19 no seizures, multifocal cerebral dysfunction  Continue to monitor neuro status closely   Tylenol PRN for fevers   C/w Keppra for prophylaxis     acetaminophen    Suspension .. 650 milliGRAM(s) Enteral Tube every 6 hours PRN Temp greater or equal to 38C (100.4F)  levETIRAcetam  IVPB 750 milliGRAM(s) IV Intermittent every 12 hours    ==================== RESPIRATORY======================  Respiratory status required full ventilatory support, close monitoring of respiratory rate and breathing pattern, the following of ABG’s with A-line monitoring, continuous pulse oximetry monitoring     Mechanical Ventilation:  Mode: AC/ CMV (Assist Control/ Continuous Mandatory Ventilation)  RR (machine): 12  TV (machine): 650  FiO2: 50  PEEP: 7  ITime: 1  MAP: 11  PIP: 25      ====================CARDIOVASCULAR==================  Type A dissection, not a candidate for surgical intervention due to stroke   Blood pressure management with IV Cardene, Labetalol, and Norvasc for HTN; SBP goal 140s   Continue invasive hemodynamic monitoring   Lipitor for CAD     atorvastatin 40 milliGRAM(s) Oral at bedtime  amLODIPine   Tablet 10 milliGRAM(s) Oral <User Schedule>  labetalol 600 milliGRAM(s) Oral every 8 hours  niCARdipine Infusion 2 mG/Hr (10 mL/Hr) IV Continuous <Continuous>    ===================HEMATOLOGIC/ONC ===================  Monitor H&H/Plts      VTE prophylaxis, heparin   Injectable 5000 Unit(s) SubCutaneous every 8 hours    ===================== RENAL =========================  MILDRED with underlying CKD, renal following   Continue monitoring I&Os, BUN/Cr, and urine output   Replete lytes PRN. Keep K> 4 and Mg >2      ==================== GASTROINTESTINAL===================  Tolerating tube feeds, Vital AF @ 65cc/hr     GI prophylaxis, pantoprazole  Injectable 40 milliGRAM(s) IV Push daily    =======================    ENDOCRINE  =====================  Continue monitoring blood glucose closely for need to initiate sliding scale     ========================INFECTIOUS DISEASE================  Temp 100.9F, WBC downtrending 11.48->10.49  Continue trending WBC and monitoring fever curve   SCx on 8/15: +Staphylococcus aureus, BCx 8/18 +staph epi; c/w Zosyn and Vancomycin     piperacillin/tazobactam IVPB.. 3.375 Gram(s) IV Intermittent every 8 hours  vancomycin  IVPB 1000 milliGRAM(s) IV Intermittent daily        I have spent 35 minutes providing acute care for this critically ill patient     Patient requires continuous monitoring with bedside rhythm monitoring, pulse ox monitoring, and intermittent blood gas analysis. Care plan discussed with ICU care team. Patient remained critical and at risk for life threatening decompensation.     By signing my name below, I, Hannah Coronado, attest that this documentation has been prepared under the direction and in the presence of Jarvis Calloway MD   Electronically signed: Caryl Buchanan, 08-23-21 @ 07:49    I, Jarvis Calloway, personally performed the services described in this documentation. all medical record entries made by the caryl were at my direction and in my presence. I have reviewed the chart and agree that the record reflects my personal performance and is accurate and complete  Electronically signed: Jarvis Calloway MD

## 2021-08-23 NOTE — PROGRESS NOTE ADULT - ASSESSMENT
55 yo RH man with  chronic pain (takes Advil and Aleve) and ?EtOH abuse/substance abuse who presented as a transfer from [facility] on 8/13 for L BG hemorrhage.   MRI head shows L BG IPH + innumerable small acute infarcts within the watershed areas of the JEMIMA/MCA territories as well as the MCA/PCA territories bilaterally.   EEG wit no seizures but now with burst suppression  A1c 5.3, LDL 68   Impression: L BG hemorrhage with IVH likely 2/2 HTN vs alternate etiology; MRI head also shows innumerable bilateral watershed territory acute ischemic infarcts, likely secondary to aortic dissection.    8/22 - worsening exam, no spontaneous movement, no pupillary reflex, no gag, + corenals, + OCC, and eeg with burst suppresion off sedation ~24hrs     - c/w keppra lowered to 750mg BID   - hold AC/AP  - lipitor 40 when able   - at this point given exam today which has progressed and eeg with burst suppression now off sedation for ~48hrs, patient has low likelihood of meaningful recovery. He like likely require trach/peg and 24hrs nursing care at this point and likely be bed bound vent dependent.    - would speak to family regarding GOC  - spoke with ICU team ACP  - BP parameters per ICU   - check FS, glucose control <180  - GI/DVT ppx  - Counseling on diet, exercise, and medication adherence was done  - Counseling on smoking cessation and alcohol consumption offered when appropriate.  - Pain assessed and judicious use of narcotics when appropriate was discussed.    - Stroke education given when appropriate.  - Importance of fall prevention discussed.   - Differential diagnosis and plan of care discussed with patient and/or family and primary team  - Thank you for allowing me to participate in the care of this patient. Call with questions.   - spoke with family bedside 8/23, planning for comfort DNR/I  Carlos Sierra MD  Vascular Neurology

## 2021-08-24 NOTE — PROGRESS NOTE ADULT - PROVIDER SPECIALTY LIST ADULT
Critical Care
Nephrology
Neurology
Neurology
Neurosurgery
Critical Care
NSICU
Nephrology
Nephrology
Neurology
Neurology
Critical Care
Nephrology
Nephrology
Neurology
Neurology
Neurosurgery
Nephrology
Critical Care

## 2021-08-24 NOTE — PROGRESS NOTE ADULT - SUBJECTIVE AND OBJECTIVE BOX
LUCY MACIAS  MRN-[id number]  Patient is a 56y old  Male who presents with a chief complaint of Stroke, left basal ganglia intraparenchymal hemorrhage (23 Aug 2021 20:15)      HPI:  55yo man w/ no known PMH, no medications, presents to ED as transfer from [facility] with left basal ganglia intraparenchymal hemorrhage and incidental finding of Type A dissection.  Patient's brother at bedside and provides history. 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 Pt denies any fall, syncope, trauma although per EMS reports patient had a fall and was found with word finding difficulty and right-sided weakness. At [place], CTH noncon was done and demonstrates left basal ganglia hemorrhage. Patient subsequently transferred to [facility] ED, repeat imaging showed possible Type A aortic dissection. Cardiothoracic surgery consulted, CTA CAP showed Type A dissection from distal ascending aorta to distal arch. Pt denies any chest pain, palpitations, SOB, N/V/D, numbness, tingling in extremities. Pt in [facility] ED on nicardipine gtt. (13 Aug 2021 14:59)      Surgery/Hospital Course:  8/13  Transferred to [facility]; Type A dissection, Acute Intraparenchymal hematoma w/ L basal ganglia, small IVH; ECHO: mild AI and mild MR, ao root minimally dilated 3.9, normal EF and nml RV function    8/14 intubated, CT head slight increase, repeat CT chest unchanged   8/15 CTH unchanged, febrile ->pancultured   8/16 CTH - mild mass effects, mult new small hypodensities w/l watershed area c/f acute ischemic infarcts, EEG prelim no seizures, MRI no sig changed in intraparenchymal hematoma, stable L occipital hemorrhage, mild mass effect w/p shift, innumerable small acute infarcts w/l watershed area of JEMIMA/MCA/MCA/PCA bilaterally, single acute lacunar infarct lateral left cerebellum   8/19 EEG no seizure for 3 days, multifocal cerebral dysfunction   8/19 CTH (no change)/ CT chest (new LLL PNA)     Today:  No acute events     ICU Vital Signs Last 24 Hrs  T(C): 36.7 (24 Aug 2021 06:00), Max: 37.2 (23 Aug 2021 08:00)  T(F): 98 (24 Aug 2021 06:00), Max: 99 (23 Aug 2021 08:00)  HR: 63 (24 Aug 2021 07:00) (54 - 64)  BP: --  BP(mean): --  ABP: 142/65 (24 Aug 2021 07:00) (128/53 - 153/70)  ABP(mean): 87 (24 Aug 2021 07:00) (73 - 94)  RR: 12 (24 Aug 2021 07:00) (12 - 24)  SpO2: 97% (24 Aug 2021 07:00) (95% - 99%)      Physical Exam:  Gen:  intubated   CNS: coma   Neck: no JVD  RES : clear , no wheezing              CVS: Regular  rhythm. Normal S1/S2  Abd: Soft, non-distended. Bowel sounds present.  Skin: No rash.  Ext:  no edema  ============================I/O===========================   I&O's Detail    23 Aug 2021 07:01  -  24 Aug 2021 07:00  --------------------------------------------------------  IN:    FentaNYL: 81 mL    IV PiggyBack: 450 mL    IV PiggyBack: 300 mL    NiCARdipine: 510 mL    Vital1.0: 1495 mL  Total IN: 2836 mL    OUT:    Indwelling Catheter - Urethral (mL): 1800 mL  Total OUT: 1800 mL    Total NET: 1036 mL        ============================ LABS =========================                        10.0   10.49 )-----------( 318      ( 23 Aug 2021 00:37 )             32.5     08-23    149<H>  |  117<H>  |  29<H>  ----------------------------<  158<H>  4.2   |  21<L>  |  1.40<H>    Ca    9.1      23 Aug 2021 00:37  Phos  3.5     08-23  Mg     2.5     08-23    TPro  6.3  /  Alb  2.5<L>  /  TBili  0.4  /  DBili  x   /  AST  35  /  ALT  43  /  AlkPhos  126<H>  08-23    LIVER FUNCTIONS - ( 23 Aug 2021 00:37 )  Alb: 2.5 g/dL / Pro: 6.3 g/dL / ALK PHOS: 126 U/L / ALT: 43 U/L / AST: 35 U/L / GGT: x             ABG - ( 23 Aug 2021 00:18 )  pH, Arterial: 7.39  pH, Blood: x     /  pCO2: 42    /  pO2: 82    / HCO3: 25    / Base Excess: 0.3   /  SaO2: 98.4                ======================Micro/Rad/Cardio=================  Culture: Reviewed   CXR: Reviewed  Echo:Reviewed  ======================================================  PAST MEDICAL & SURGICAL HISTORY:  No pertinent past medical history    No significant past surgical history      ====================ASSESSMENT ==============  Left basal ganglia intraparenchymal hemorrhagic stroke   Type A dissection, Acute Intraparenchymal hematoma   CKD      Plan:  ====================== NEUROLOGY=====================  L BG hemorrhage with IVH likely 2/2 HTN vs alternate etiology as per neurology   MRI head also shows innumerable bilateral watershed territory acute ischemic infarcts, likely secondary to aortic dissection  EEG 8/19 no seizures, multifocal cerebral dysfunction  Continue to monitor neuro status closely   IV Fentanyl infusion for analgesia  Tylenol PRN for fevers   C/w Keppra for prophylaxis     acetaminophen    Suspension .. 650 milliGRAM(s) Enteral Tube every 6 hours PRN Temp greater or equal to 38C (100.4F)  fentaNYL   Infusion.. 1 MICROgram(s)/kG/Hr (5.35 mL/Hr) IV Continuous <Continuous>  levETIRAcetam  IVPB 750 milliGRAM(s) IV Intermittent every 12 hours    ==================== RESPIRATORY======================  Respiratory status required full ventilatory support, close monitoring of respiratory rate and breathing pattern, the following of ABG’s with A-line monitoring, continuous pulse oximetry monitoring     Mechanical Ventilation:  Mode: AC/ CMV (Assist Control/ Continuous Mandatory Ventilation)  RR (machine): 12  TV (machine): 650  FiO2: 50  PEEP: 7  ITime: 1  MAP: 10  PIP: 27      ====================CARDIOVASCULAR==================  Type A dissection, not a candidate for surgical intervention due to stroke   Blood pressure management with IV Cardene, Labetalol, and Norvasc for HTN; SBP goal 140s   Continue invasive hemodynamic monitoring   Lipitor for CAD     amLODIPine   Tablet 10 milliGRAM(s) Oral <User Schedule>  atorvastatin 40 milliGRAM(s) Oral at bedtime  labetalol 600 milliGRAM(s) Oral every 8 hours  niCARdipine Infusion 2 mG/Hr (10 mL/Hr) IV Continuous <Continuous>    ===================HEMATOLOGIC/ONC ===================  Monitor H&H/Plts    VTE prophylaxis, heparin Injectable 5000 Unit(s) SubCutaneous every 8 hours    ===================== RENAL =========================  MILDRED with underlying CKD, renal following   Continue monitoring I&Os, BUN/Cr, and urine output   Replete lytes PRN. Keep K> 4 and Mg >2     ==================== GASTROINTESTINAL===================  Tolerating tube feeds, Vital AF @ 65cc/hr   GI prophylaxis, pantoprazole  Injectable 40 milliGRAM(s) IV Push daily    =======================    ENDOCRINE  =====================   Continue monitoring blood glucose closely for need to initiate sliding scale     ========================INFECTIOUS DISEASE================  Afebrile, WBC downtrending 11.48->10.49  Continue trending WBC and monitoring fever curve   SCx on 8/15: +Staphylococcus aureus, BCx 8/18 +staph epi; c/w Zosyn and Vancomycin     piperacillin/tazobactam IVPB.. 3.375 Gram(s) IV Intermittent every 8 hours    vancomycin  IVPB 1000 milliGRAM(s) IV Intermittent daily            I have spent 35 minutes providing acute care for this critically ill patient     Patient requires continuous monitoring with bedside rhythm monitoring, pulse ox monitoring, and intermittent blood gas analysis. Care plan discussed with ICU care team. Patient remained critical and at risk for life threatening decompensation.     By signing my name below, I, Hannah Coronado, attest that this documentation has been prepared under the direction and in the presence of Jarvis Calloway MD   Electronically signed: Caryl Buchanan, 08-24-21 @ 07:24    I, Jarvis Calloway, personally performed the services described in this documentation. all medical record entries made by the caryl were at my direction and in my presence. I have reviewed the chart and agree that the record reflects my personal performance and is accurate and complete  Electronically signed: Jarvis Calloway MD        LUCY MACIAS  MRN-[id number]  Patient is a 56y old  Male who presents with a chief complaint of Stroke, left basal ganglia intraparenchymal hemorrhage (23 Aug 2021 20:15)      HPI:  57yo man w/ no known PMH, no medications, presents to ED as transfer from [facility] with left basal ganglia intraparenchymal hemorrhage and incidental finding of Type A dissection.  Patient's brother at bedside and provides history. 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 Pt denies any fall, syncope, trauma although per EMS reports patient had a fall and was found with word finding difficulty and right-sided weakness. At [place], CTH noncon was done and demonstrates left basal ganglia hemorrhage. Patient subsequently transferred to [facility] ED, repeat imaging showed possible Type A aortic dissection. Cardiothoracic surgery consulted, CTA CAP showed Type A dissection from distal ascending aorta to distal arch. Pt denies any chest pain, palpitations, SOB, N/V/D, numbness, tingling in extremities. Pt in [facility] ED on nicardipine gtt. (13 Aug 2021 14:59)      Surgery/Hospital Course:  8/13  Transferred to [facility]; Type A dissection, Acute Intraparenchymal hematoma w/ L basal ganglia, small IVH; ECHO: mild AI and mild MR, ao root minimally dilated 3.9, normal EF and nml RV function    8/14 intubated, CT head slight increase, repeat CT chest unchanged   8/15 CTH unchanged, febrile ->pancultured   8/16 CTH - mild mass effects, mult new small hypodensities w/l watershed area c/f acute ischemic infarcts, EEG prelim no seizures, MRI no sig changed in intraparenchymal hematoma, stable L occipital hemorrhage, mild mass effect w/p shift, innumerable small acute infarcts w/l watershed area of JEMIMA/MCA/MCA/PCA bilaterally, single acute lacunar infarct lateral left cerebellum   8/19 EEG no seizure for 3 days, multifocal cerebral dysfunction   8/19 CTH (no change)/ CT chest (new LLL PNA)     Today:  For compassionate extubation today.     ICU Vital Signs Last 24 Hrs  T(C): 36.7 (24 Aug 2021 06:00), Max: 37.2 (23 Aug 2021 08:00)  T(F): 98 (24 Aug 2021 06:00), Max: 99 (23 Aug 2021 08:00)  HR: 63 (24 Aug 2021 07:00) (54 - 64)  BP: --  BP(mean): --  ABP: 142/65 (24 Aug 2021 07:00) (128/53 - 153/70)  ABP(mean): 87 (24 Aug 2021 07:00) (73 - 94)  RR: 12 (24 Aug 2021 07:00) (12 - 24)  SpO2: 97% (24 Aug 2021 07:00) (95% - 99%)      Physical Exam:  Gen:  intubated   CNS: coma   Neck: no JVD  RES : clear , no wheezing              CVS: Regular  rhythm. Normal S1/S2  Abd: Soft, non-distended. Bowel sounds present.  Skin: No rash.  Ext:  no edema  ============================I/O===========================   I&O's Detail    23 Aug 2021 07:01  -  24 Aug 2021 07:00  --------------------------------------------------------  IN:    FentaNYL: 81 mL    IV PiggyBack: 450 mL    IV PiggyBack: 300 mL    NiCARdipine: 510 mL    Vital1.0: 1495 mL  Total IN: 2836 mL    OUT:    Indwelling Catheter - Urethral (mL): 1800 mL  Total OUT: 1800 mL    Total NET: 1036 mL        ============================ LABS =========================                        10.0   10.49 )-----------( 318      ( 23 Aug 2021 00:37 )             32.5     08-23    149<H>  |  117<H>  |  29<H>  ----------------------------<  158<H>  4.2   |  21<L>  |  1.40<H>    Ca    9.1      23 Aug 2021 00:37  Phos  3.5     08-23  Mg     2.5     08-23    TPro  6.3  /  Alb  2.5<L>  /  TBili  0.4  /  DBili  x   /  AST  35  /  ALT  43  /  AlkPhos  126<H>  08-23    LIVER FUNCTIONS - ( 23 Aug 2021 00:37 )  Alb: 2.5 g/dL / Pro: 6.3 g/dL / ALK PHOS: 126 U/L / ALT: 43 U/L / AST: 35 U/L / GGT: x             ABG - ( 23 Aug 2021 00:18 )  pH, Arterial: 7.39  pH, Blood: x     /  pCO2: 42    /  pO2: 82    / HCO3: 25    / Base Excess: 0.3   /  SaO2: 98.4                ======================Micro/Rad/Cardio=================  Culture: Reviewed   CXR: Reviewed  Echo:Reviewed  ======================================================  PAST MEDICAL & SURGICAL HISTORY:  No pertinent past medical history    No significant past surgical history      ====================ASSESSMENT ==============  Left basal ganglia intraparenchymal hemorrhagic stroke   Type A dissection, Acute Intraparenchymal hematoma   CKD      Plan:  ====================== NEUROLOGY=====================  L BG hemorrhage with IVH likely 2/2 HTN vs alternate etiology as per neurology   MRI head also shows innumerable bilateral watershed territory acute ischemic infarcts, likely secondary to aortic dissection  EEG 8/19 no seizures, multifocal cerebral dysfunction  Continue to monitor neuro status closely   IV Fentanyl infusion for analgesia  Tylenol PRN for fevers   C/w Keppra for prophylaxis     acetaminophen    Suspension .. 650 milliGRAM(s) Enteral Tube every 6 hours PRN Temp greater or equal to 38C (100.4F)  fentaNYL   Infusion.. 1 MICROgram(s)/kG/Hr (5.35 mL/Hr) IV Continuous <Continuous>  levETIRAcetam  IVPB 750 milliGRAM(s) IV Intermittent every 12 hours    ==================== RESPIRATORY======================  Respiratory status required full ventilatory support, close monitoring of respiratory rate and breathing pattern, the following of ABG’s with A-line monitoring, continuous pulse oximetry monitoring   For compassionate extubation today     Mechanical Ventilation:  Mode: AC/ CMV (Assist Control/ Continuous Mandatory Ventilation)  RR (machine): 12  TV (machine): 650  FiO2: 50  PEEP: 7  ITime: 1  MAP: 10  PIP: 27      ====================CARDIOVASCULAR==================  Type A dissection, not a candidate for surgical intervention due to stroke   Blood pressure management with IV Cardene, Labetalol, and Norvasc for HTN; SBP goal 140s   Continue invasive hemodynamic monitoring   Lipitor for CAD     amLODIPine   Tablet 10 milliGRAM(s) Oral <User Schedule>  atorvastatin 40 milliGRAM(s) Oral at bedtime  labetalol 600 milliGRAM(s) Oral every 8 hours  niCARdipine Infusion 2 mG/Hr (10 mL/Hr) IV Continuous <Continuous>    ===================HEMATOLOGIC/ONC ===================  Monitor H&H/Plts    VTE prophylaxis, heparin Injectable 5000 Unit(s) SubCutaneous every 8 hours    ===================== RENAL =========================  MILDRED with underlying CKD, renal following   Continue monitoring I&Os, BUN/Cr, and urine output   Replete lytes PRN. Keep K> 4 and Mg >2     ==================== GASTROINTESTINAL===================  Tolerating tube feeds, Vital AF @ 65cc/hr   GI prophylaxis, pantoprazole  Injectable 40 milliGRAM(s) IV Push daily    =======================    ENDOCRINE  =====================   Continue monitoring blood glucose closely for need to initiate sliding scale     ========================INFECTIOUS DISEASE================  Afebrile, WBC downtrending 11.48->10.49  Continue trending WBC and monitoring fever curve   SCx on 8/15: +Staphylococcus aureus, BCx 8/18 +staph epi; c/w Zosyn and Vancomycin     piperacillin/tazobactam IVPB.. 3.375 Gram(s) IV Intermittent every 8 hours    vancomycin  IVPB 1000 milliGRAM(s) IV Intermittent daily            I have spent 35 minutes providing acute care for this critically ill patient     Patient requires continuous monitoring with bedside rhythm monitoring, pulse ox monitoring, and intermittent blood gas analysis. Care plan discussed with ICU care team. Patient remained critical and at risk for life threatening decompensation.     By signing my name below, I, Hannah Coronado, attest that this documentation has been prepared under the direction and in the presence of Jarvis Calloway MD   Electronically signed: Caryl Buchanan, 08-24-21 @ 07:24    I, Jarvis Calloway, personally performed the services described in this documentation. all medical record entries made by the caryl were at my direction and in my presence. I have reviewed the chart and agree that the record reflects my personal performance and is accurate and complete  Electronically signed: Jarvis Calloway MD        LUCY MACIAS  MRN-[id number]  Patient is a 56y old  Male who presents with a chief complaint of Stroke, left basal ganglia intraparenchymal hemorrhage (23 Aug 2021 20:15)      HPI:  55yo man w/ no known PMH, no medications, presents to ED as transfer from [facility] with left basal ganglia intraparenchymal hemorrhage and incidental finding of Type A dissection.  Patient's brother at bedside and provides history. 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 Pt denies any fall, syncope, trauma although per EMS reports patient had a fall and was found with word finding difficulty and right-sided weakness. At [place], CTH noncon was done and demonstrates left basal ganglia hemorrhage. Patient subsequently transferred to [facility] ED, repeat imaging showed possible Type A aortic dissection. Cardiothoracic surgery consulted, CTA CAP showed Type A dissection from distal ascending aorta to distal arch. Pt denies any chest pain, palpitations, SOB, N/V/D, numbness, tingling in extremities. Pt in [facility] ED on nicardipine gtt. (13 Aug 2021 14:59)      Surgery/Hospital Course:  8/13  Transferred to [facility]; Type A dissection, Acute Intraparenchymal hematoma w/ L basal ganglia, small IVH; ECHO: mild AI and mild MR, ao root minimally dilated 3.9, normal EF and nml RV function    8/14 intubated, CT head slight increase, repeat CT chest unchanged   8/15 CTH unchanged, febrile ->pancultured   8/16 CTH - mild mass effects, mult new small hypodensities w/l watershed area c/f acute ischemic infarcts, EEG prelim no seizures, MRI no sig changed in intraparenchymal hematoma, stable L occipital hemorrhage, mild mass effect w/p shift, innumerable small acute infarcts w/l watershed area of JEMIMA/MCA/MCA/PCA bilaterally, single acute lacunar infarct lateral left cerebellum   8/19 EEG no seizure for 3 days, multifocal cerebral dysfunction   8/19 CTH (no change)/ CT chest (new LLL PNA)     Today:  Patient is DNR - compassionate extubation today.     ICU Vital Signs Last 24 Hrs  T(C): 36.7 (24 Aug 2021 06:00), Max: 37.2 (23 Aug 2021 08:00)  T(F): 98 (24 Aug 2021 06:00), Max: 99 (23 Aug 2021 08:00)  HR: 63 (24 Aug 2021 07:00) (54 - 64)  BP: --  BP(mean): --  ABP: 142/65 (24 Aug 2021 07:00) (128/53 - 153/70)  ABP(mean): 87 (24 Aug 2021 07:00) (73 - 94)  RR: 12 (24 Aug 2021 07:00) (12 - 24)  SpO2: 97% (24 Aug 2021 07:00) (95% - 99%)      Physical Exam:  Gen:  intubated   CNS: coma   Neck: no JVD  RES : clear , no wheezing              CVS: Regular  rhythm. Normal S1/S2  Abd: Soft, non-distended. Bowel sounds present.  Skin: No rash.  Ext:  no edema  ============================I/O===========================   I&O's Detail    23 Aug 2021 07:01  -  24 Aug 2021 07:00  --------------------------------------------------------  IN:    FentaNYL: 81 mL    IV PiggyBack: 450 mL    IV PiggyBack: 300 mL    NiCARdipine: 510 mL    Vital1.0: 1495 mL  Total IN: 2836 mL    OUT:    Indwelling Catheter - Urethral (mL): 1800 mL  Total OUT: 1800 mL    Total NET: 1036 mL        ============================ LABS =========================                        10.0   10.49 )-----------( 318      ( 23 Aug 2021 00:37 )             32.5     08-23    149<H>  |  117<H>  |  29<H>  ----------------------------<  158<H>  4.2   |  21<L>  |  1.40<H>    Ca    9.1      23 Aug 2021 00:37  Phos  3.5     08-23  Mg     2.5     08-23    TPro  6.3  /  Alb  2.5<L>  /  TBili  0.4  /  DBili  x   /  AST  35  /  ALT  43  /  AlkPhos  126<H>  08-23    LIVER FUNCTIONS - ( 23 Aug 2021 00:37 )  Alb: 2.5 g/dL / Pro: 6.3 g/dL / ALK PHOS: 126 U/L / ALT: 43 U/L / AST: 35 U/L / GGT: x             ABG - ( 23 Aug 2021 00:18 )  pH, Arterial: 7.39  pH, Blood: x     /  pCO2: 42    /  pO2: 82    / HCO3: 25    / Base Excess: 0.3   /  SaO2: 98.4                ======================Micro/Rad/Cardio=================  Culture: Reviewed   CXR: Reviewed  Echo:Reviewed  ======================================================  PAST MEDICAL & SURGICAL HISTORY:  No pertinent past medical history    No significant past surgical history      ====================ASSESSMENT ==============  Left basal ganglia intraparenchymal hemorrhagic stroke   Type A dissection, Acute Intraparenchymal hematoma   CKD      Plan:  ====================== NEUROLOGY=====================  L BG hemorrhage with IVH likely 2/2 HTN vs alternate etiology as per neurology   MRI head also shows innumerable bilateral watershed territory acute ischemic infarcts, likely secondary to aortic dissection  EEG 8/19 no seizures, multifocal cerebral dysfunction  Continue to monitor neuro status closely   IV Fentanyl infusion for analgesia  Tylenol PRN for fevers   C/w Keppra for prophylaxis     acetaminophen    Suspension .. 650 milliGRAM(s) Enteral Tube every 6 hours PRN Temp greater or equal to 38C (100.4F)  fentaNYL   Infusion.. 1 MICROgram(s)/kG/Hr (5.35 mL/Hr) IV Continuous <Continuous>  levETIRAcetam  IVPB 750 milliGRAM(s) IV Intermittent every 12 hours    ==================== RESPIRATORY======================  Respiratory status required full ventilatory support, close monitoring of respiratory rate and breathing pattern, the following of ABG’s with A-line monitoring, continuous pulse oximetry monitoring   For compassionate extubation today     Mechanical Ventilation:  Mode: AC/ CMV (Assist Control/ Continuous Mandatory Ventilation)  RR (machine): 12  TV (machine): 650  FiO2: 50  PEEP: 7  ITime: 1  MAP: 10  PIP: 27      ====================CARDIOVASCULAR==================  Type A dissection, not a candidate for surgical intervention due to stroke   Blood pressure management with IV Cardene, Labetalol, and Norvasc for HTN; SBP goal 140s   Continue invasive hemodynamic monitoring   Lipitor for CAD     amLODIPine   Tablet 10 milliGRAM(s) Oral <User Schedule>  atorvastatin 40 milliGRAM(s) Oral at bedtime  labetalol 600 milliGRAM(s) Oral every 8 hours  niCARdipine Infusion 2 mG/Hr (10 mL/Hr) IV Continuous <Continuous>    ===================HEMATOLOGIC/ONC ===================  Monitor H&H/Plts    VTE prophylaxis, heparin Injectable 5000 Unit(s) SubCutaneous every 8 hours    ===================== RENAL =========================  MILDRED with underlying CKD, renal following   Continue monitoring I&Os, BUN/Cr, and urine output   Replete lytes PRN. Keep K> 4 and Mg >2     ==================== GASTROINTESTINAL===================  Tolerating tube feeds, Vital AF @ 65cc/hr   GI prophylaxis, pantoprazole  Injectable 40 milliGRAM(s) IV Push daily    =======================    ENDOCRINE  =====================   Continue monitoring blood glucose closely for need to initiate sliding scale     ========================INFECTIOUS DISEASE================  Afebrile, WBC downtrending 11.48->10.49  Continue trending WBC and monitoring fever curve   SCx on 8/15: +Staphylococcus aureus, BCx 8/18 +staph epi; c/w Zosyn and Vancomycin     piperacillin/tazobactam IVPB.. 3.375 Gram(s) IV Intermittent every 8 hours    vancomycin  IVPB 1000 milliGRAM(s) IV Intermittent daily    =======================GOALS OF CARE ================  Patient is DNR - compassionate extubation today         I have spent 35 minutes providing acute care for this critically ill patient     Patient requires continuous monitoring with bedside rhythm monitoring, pulse ox monitoring, and intermittent blood gas analysis. Care plan discussed with ICU care team. Patient remained critical and at risk for life threatening decompensation.     By signing my name below, I, Hannah Coronado, attest that this documentation has been prepared under the direction and in the presence of Jarvis Calloway MD   Electronically signed: Shannon Buchanan, 08-24-21 @ 07:24    I, Jarvis Calloway, personally performed the services described in this documentation. all medical record entries made by the wiltonibchelsea were at my direction and in my presence. I have reviewed the chart and agree that the record reflects my personal performance and is accurate and complete  Electronically signed: Jarvis Calloway MD        LUCY MACIAS  MRN-[id number]  Patient is a 56y old  Male who presents with a chief complaint of Stroke, left basal ganglia intraparenchymal hemorrhage (23 Aug 2021 20:15)      HPI:  55yo man w/ no known PMH, no medications, presents to ED as transfer from [facility] with left basal ganglia intraparenchymal hemorrhage and incidental finding of Type A dissection.  Patient's brother at bedside and provides history. 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 Pt denies any fall, syncope, trauma although per EMS reports patient had a fall and was found with word finding difficulty and right-sided weakness. At [place], CTH noncon was done and demonstrates left basal ganglia hemorrhage. Patient subsequently transferred to [facility] ED, repeat imaging showed possible Type A aortic dissection. Cardiothoracic surgery consulted, CTA CAP showed Type A dissection from distal ascending aorta to distal arch. Pt denies any chest pain, palpitations, SOB, N/V/D, numbness, tingling in extremities. Pt in [facility] ED on nicardipine gtt. (13 Aug 2021 14:59)      Surgery/Hospital Course:  8/13  Transferred to [facility]; Type A dissection, Acute Intraparenchymal hematoma w/ L basal ganglia, small IVH; ECHO: mild AI and mild MR, ao root minimally dilated 3.9, normal EF and nml RV function    8/14 intubated, CT head slight increase, repeat CT chest unchanged   8/15 CTH unchanged, febrile ->pancultured   8/16 CTH - mild mass effects, mult new small hypodensities w/l watershed area c/f acute ischemic infarcts, EEG prelim no seizures, MRI no sig changed in intraparenchymal hematoma, stable L occipital hemorrhage, mild mass effect w/p shift, innumerable small acute infarcts w/l watershed area of JEMIMA/MCA/MCA/PCA bilaterally, single acute lacunar infarct lateral left cerebellum   8/19 EEG no seizure for 3 days, multifocal cerebral dysfunction   8/19 CTH (no change)/ CT chest (new LLL PNA)     Today:  Patient is DNR - compassionate extubation today.     ICU Vital Signs Last 24 Hrs  T(C): 36.7 (24 Aug 2021 06:00), Max: 37.2 (23 Aug 2021 08:00)  T(F): 98 (24 Aug 2021 06:00), Max: 99 (23 Aug 2021 08:00)  HR: 63 (24 Aug 2021 07:00) (54 - 64)  BP: --  BP(mean): --  ABP: 142/65 (24 Aug 2021 07:00) (128/53 - 153/70)  ABP(mean): 87 (24 Aug 2021 07:00) (73 - 94)  RR: 12 (24 Aug 2021 07:00) (12 - 24)  SpO2: 97% (24 Aug 2021 07:00) (95% - 99%)      Physical Exam:  Gen:  intubated   CNS: coma   Neck: no JVD  RES : clear , no wheezing              CVS: Regular  rhythm. Normal S1/S2  Abd: Soft, non-distended. Bowel sounds present.  Skin: No rash.  Ext:  no edema  ============================I/O===========================   I&O's Detail    23 Aug 2021 07:01  -  24 Aug 2021 07:00  --------------------------------------------------------  IN:    FentaNYL: 81 mL    IV PiggyBack: 450 mL    IV PiggyBack: 300 mL    NiCARdipine: 510 mL    Vital1.0: 1495 mL  Total IN: 2836 mL    OUT:    Indwelling Catheter - Urethral (mL): 1800 mL  Total OUT: 1800 mL    Total NET: 1036 mL        ============================ LABS =========================                        10.0   10.49 )-----------( 318      ( 23 Aug 2021 00:37 )             32.5     08-23    149<H>  |  117<H>  |  29<H>  ----------------------------<  158<H>  4.2   |  21<L>  |  1.40<H>    Ca    9.1      23 Aug 2021 00:37  Phos  3.5     08-23  Mg     2.5     08-23    TPro  6.3  /  Alb  2.5<L>  /  TBili  0.4  /  DBili  x   /  AST  35  /  ALT  43  /  AlkPhos  126<H>  08-23    LIVER FUNCTIONS - ( 23 Aug 2021 00:37 )  Alb: 2.5 g/dL / Pro: 6.3 g/dL / ALK PHOS: 126 U/L / ALT: 43 U/L / AST: 35 U/L / GGT: x             ABG - ( 23 Aug 2021 00:18 )  pH, Arterial: 7.39  pH, Blood: x     /  pCO2: 42    /  pO2: 82    / HCO3: 25    / Base Excess: 0.3   /  SaO2: 98.4                ======================Micro/Rad/Cardio=================  Culture: Reviewed   CXR: Reviewed  Echo:Reviewed  ======================================================  PAST MEDICAL & SURGICAL HISTORY:  No pertinent past medical history    No significant past surgical history      ====================ASSESSMENT ==============  Left basal ganglia intraparenchymal hemorrhagic stroke   Type A dissection, Acute Intraparenchymal hematoma   CKD      Plan:  ====================== NEUROLOGY=====================  L BG hemorrhage with IVH likely 2/2 HTN vs alternate etiology as per neurology   MRI head also shows innumerable bilateral watershed territory acute ischemic infarcts, likely secondary to aortic dissection  EEG 8/19 no seizures, multifocal cerebral dysfunction  Continue to monitor neuro status closely   IV Fentanyl infusion for analgesia  Tylenol PRN for fevers   C/w Keppra for prophylaxis     acetaminophen    Suspension .. 650 milliGRAM(s) Enteral Tube every 6 hours PRN Temp greater or equal to 38C (100.4F)  fentaNYL   Infusion.. 1 MICROgram(s)/kG/Hr (5.35 mL/Hr) IV Continuous <Continuous>  levETIRAcetam  IVPB 750 milliGRAM(s) IV Intermittent every 12 hours    ==================== RESPIRATORY======================  Respiratory status required full ventilatory support, close monitoring of respiratory rate and breathing pattern, the following of ABG’s with A-line monitoring, continuous pulse oximetry monitoring   For compassionate extubation today     Mechanical Ventilation:  Mode: AC/ CMV (Assist Control/ Continuous Mandatory Ventilation)  RR (machine): 12  TV (machine): 650  FiO2: 50  PEEP: 7  ITime: 1  MAP: 10  PIP: 27      ====================CARDIOVASCULAR==================  Type A dissection, not a candidate for surgical intervention due to stroke   Blood pressure management with IV Cardene, Labetalol, and Norvasc for HTN; SBP goal 140s   Continue invasive hemodynamic monitoring   Lipitor for CAD     amLODIPine   Tablet 10 milliGRAM(s) Oral <User Schedule>  atorvastatin 40 milliGRAM(s) Oral at bedtime  labetalol 600 milliGRAM(s) Oral every 8 hours  niCARdipine Infusion 2 mG/Hr (10 mL/Hr) IV Continuous <Continuous>    ===================HEMATOLOGIC/ONC ===================  Monitor H&H/Plts    VTE prophylaxis, heparin Injectable 5000 Unit(s) SubCutaneous every 8 hours    ===================== RENAL =========================  MILDRED with underlying CKD, renal following   Continue monitoring I&Os, BUN/Cr, and urine output   Replete lytes PRN. Keep K> 4 and Mg >2     ==================== GASTROINTESTINAL===================  Tolerating tube feeds, Vital AF @ 65cc/hr   GI prophylaxis, pantoprazole  Injectable 40 milliGRAM(s) IV Push daily    =======================    ENDOCRINE  =====================   Continue monitoring blood glucose closely for need to initiate sliding scale     ========================INFECTIOUS DISEASE================  Afebrile, WBC downtrending 11.48->10.49  Continue trending WBC and monitoring fever curve   SCx on 8/15: +Staphylococcus aureus, BCx 8/18 +staph epi; c/w Zosyn and Vancomycin     piperacillin/tazobactam IVPB.. 3.375 Gram(s) IV Intermittent every 8 hours    vancomycin  IVPB 1000 milliGRAM(s) IV Intermittent daily    =======================GOALS OF CARE ================  Patient is DNR - compassionate extubation today         I have spent 75 minutes providing acute care for this critically ill patient     Patient requires continuous monitoring with bedside rhythm monitoring, pulse ox monitoring, and intermittent blood gas analysis. Care plan discussed with ICU care team. Patient remained critical and at risk for life threatening decompensation.     By signing my name below, I, Hannah Coronado, attest that this documentation has been prepared under the direction and in the presence of Jarvis Calloway MD   Electronically signed: Shannon Buchanan, 08-24-21 @ 07:24    I, Jarvis Calloway, personally performed the services described in this documentation. all medical record entries made by the [email or web address] were at my direction and in my presence. I have reviewed the chart and agree that the record reflects my personal performance and is accurate and complete  Electronically signed: Jarvis Calloway MD

## 2021-08-24 NOTE — CHART NOTE - NSCHARTNOTEFT_GEN_A_CORE
Nutrition Follow Up Note  Patient seen for: Nutrition Follow Up     Chart reviewed, events noted. Pt is a 57 yo M with hx of chronic pain, and ?EWtOH abuse/substance abuse. Presented as a transfer from OSH on  for L BG hemorrhage with IVH likely 2/2 HTN vs alternate etiology; MRI head also shows innumerable bilateral watershed territory acute ischemic infarcts, likely secondary to aortic dissection. Pt now DNR/I, no more escalation of care noted.     Source: [] Patient       [x] EMR        [x] RN        [] Family at bedside       [] Other:    Diet Order:   Diet, NPO with Tube Feed:   Tube Feeding Modality: Orogastric  Vital AF (VITALAFRTH)  Total Volume for 24 Hours (mL): 1560  Continuous  Starting Tube Feed Rate {mL per Hour}: 10  Increase Tube Feed Rate by (mL): 10     Every 4 hours  Until Goal Tube Feed Rate (mL per Hour): 65  Tube Feed Duration (in Hours): 24  Tube Feed Start Time: 09:00 (08-15-21)    EN Order Provides: 1560ml, 1872kcal and 117g protein.     Nutrition-related concerns:  -EN provision: pt received 100% of feeds (-); currently infusing at goal rate.  -Last BM: (): x 2. Not on apparent bowel regimen.  -Pt continues on antibiotics as ordered.     Weights:   Daily Weight in k.4 (-), Weight in k.7 (), Weight in k.8 (), Weight in k.5 (), Weight in k.6 (-), Weight in k.6 ()    MEDICATIONS  (STANDING):  amLODIPine   Tablet  atorvastatin  labetalol  niCARdipine Infusion  pantoprazole  Injectable  piperacillin/tazobactam IVPB..  vancomycin  IVPB  vancomycin  IVPB    Pertinent Labs:   A1C with Estimated Average Glucose Result: 5.3 % (21 @ 15:20)    Finger Sticks:    Triglycerides, Serum: 56 mg/dL (21 @ 13:21)    Skin per nursing documentation: no pressure injuries noted  Edema: 2+ generalized    (based on dosing wt 107g protein [calories]; IBW 72.5g [protein])  Estimated Energy Needs: (15-20kcal/kg): 1605-2140kcal  Estimated Protein Needs: (1.4-1.6g protein/kg): 102-116g protein    Previous Nutrition Diagnosis: n/a       Recommendations:      1. Continue Vital AF at 65ml/hr x 24 hrs as ordered.   2. Defer continuation of EN feeds to medical team; determine nutritional goals of care.    Monitoring and Evaluation:   Continue to monitor nutritional intake, tolerance to diet prescription, weights, labs, skin integrity      RD remains available upon request and will follow up per protocol

## 2021-08-24 NOTE — PROGRESS NOTE ADULT - REASON FOR ADMISSION
Stroke, left basal ganglia intraparenchymal hemorrhage
Aortic Dissection
Stroke, left basal ganglia intraparenchymal hemorrhage
Declined

## 2021-08-24 NOTE — PROGRESS NOTE ADULT - SUBJECTIVE AND OBJECTIVE BOX
Neurology Progress Note    S: Patient seen and examined in CTICU. poor prognosis. DNR/I no more esclation of care     Medication:  MEDICATIONS  (STANDING):  amLODIPine   Tablet 10 milliGRAM(s) Oral <User Schedule>  atorvastatin 40 milliGRAM(s) Oral at bedtime  chlorhexidine 0.12% Liquid 15 milliLiter(s) Oral Mucosa every 12 hours  chlorhexidine 2% Cloths 1 Application(s) Topical <User Schedule>  fentaNYL   Infusion.. 1 MICROgram(s)/kG/Hr (5.35 mL/Hr) IV Continuous <Continuous>  heparin   Injectable 5000 Unit(s) SubCutaneous every 8 hours  labetalol 600 milliGRAM(s) Oral every 8 hours  levETIRAcetam  IVPB 750 milliGRAM(s) IV Intermittent every 12 hours  niCARdipine Infusion 2 mG/Hr (10 mL/Hr) IV Continuous <Continuous>  pantoprazole  Injectable 40 milliGRAM(s) IV Push daily  petrolatum Ophthalmic Ointment 1 Application(s) Both EYES two times a day  piperacillin/tazobactam IVPB.. 3.375 Gram(s) IV Intermittent every 8 hours  vancomycin  IVPB      vancomycin  IVPB 1000 milliGRAM(s) IV Intermittent daily    MEDICATIONS  (PRN):  acetaminophen    Suspension .. 650 milliGRAM(s) Enteral Tube every 6 hours PRN Temp greater or equal to 38C (100.4F)      Vitals:  ICU Vital Signs Last 24 Hrs  T(C): 37.2 (24 Aug 2021 08:00), Max: 37.2 (24 Aug 2021 08:00)  T(F): 99 (24 Aug 2021 08:00), Max: 99 (24 Aug 2021 08:00)  HR: 63 (24 Aug 2021 11:00) (54 - 64)  BP: --  BP(mean): --  ABP: 138/63 (24 Aug 2021 11:00) (128/53 - 153/70)  ABP(mean): 85 (24 Aug 2021 11:00) (73 - 94)  RR: 12 (24 Aug 2021 11:00) (12 - 24)  SpO2: 97% (24 Aug 2021 11:00) (95% - 99%)      General Exam:   General Appearance: Appropriately dressed    Head: Normocephalic, atraumatic and no dysmorphic features  Ear, Nose, and Throat: Moist mucous membranes +ETT  CVS: S1S2+  Resp: on vent   Abd: soft NTND  Extremities: No edema, no cyanosis  Skin: No bruises, no rashes     Neurological Exam: (no sedation ~48hrs)  Mental Status: eyes closed, no verbal, not following, yawning reflexive, REM eye movements on exam    Cranial Nerves: no gag, + OCC, no pupillary response, + corenals.    Motor: no spontaneous movement   Sensation: no posturing or withdrawal to noxious   Coordination: unable   Gait: unable     I personally reviewed the below data/images/labs:        CBC Full  -  ( 23 Aug 2021 00:37 )  WBC Count : 10.49 K/uL  RBC Count : 3.32 M/uL  Hemoglobin : 10.0 g/dL  Hematocrit : 32.5 %  Platelet Count - Automated : 318 K/uL  Mean Cell Volume : 97.9 fl  Mean Cell Hemoglobin : 30.1 pg  Mean Cell Hemoglobin Concentration : 30.8 gm/dL  Auto Neutrophil # : x  Auto Lymphocyte # : x  Auto Monocyte # : x  Auto Eosinophil # : x  Auto Basophil # : x  Auto Neutrophil % : x  Auto Lymphocyte % : x  Auto Monocyte % : x  Auto Eosinophil % : x  Auto Basophil % : x    08-23    149<H>  |  117<H>  |  29<H>  ----------------------------<  158<H>  4.2   |  21<L>  |  1.40<H>    Ca    9.1      23 Aug 2021 00:37  Phos  3.5     08-23  Mg     2.5     08-23    TPro  6.3  /  Alb  2.5<L>  /  TBili  0.4  /  DBili  x   /  AST  35  /  ALT  43  /  AlkPhos  126<H>  08-23      Radiology/EEG:  -08/14 CTH: slightly increased intracranial hemorrhage within the LEFT basal ganglia measuring 5.6 x 2.6 x 3.8 cm.    Small retention cysts in the BILATERAL maxillary sinuses.  -08/14 Repeat CTH: Redemonstration of left basal ganglia hemorrhage measuring 2.3 x 5.7 x 3.3 cm, not significantly changed.  -08/14 CTA NECK AND BRAIN: No vessel occlusion, hemodynamically significant stenosis, dissection, or aneurysm. The smaller left vertebral artery is not well visualized in its entirety. Whether this is due simply to motion is unclear.  -08/15 CTH: Unchanged left basal ganglia hemorrhage with similar mass effect on left lateral ventricle. Redemonstration of layering hemorrhage within the left lateral ventricle. No new intracranial hemorrhage.  -08/16 CTH: No significant interval change in size of the acute intraparenchymal hematoma within the left basal ganglia, currently measuring 5.3 x 2.3 x 3.4 cm. Stable small amount of hemorrhage in the left occipital horn. There is mild surrounding mass effect without appreciable midline shift. Basal cisterns are patent. There are, however, multiple new small hypodensities within the watershed areas of the JEMIMA/MCA territories bilaterally raising concern for acute ischemic infarcts.  -08/16 MRI Head w/o: No significant interval change in size of the acute intraparenchymal hematoma within the left basal ganglia, currently measuring 5.3 x 2.3 x 3.4 cm. Stable small amount of hemorrhage in the left occipital horn. There is mild surrounding mass effect without appreciable midline shift. Basal cisterns are patent. Innumerable small acute infarcts within the watershed areas of the JEMIMA/MCA territories as well as the MCA/PCA territories bilaterally. Single acute lacunar infarct involving the lateral left cerebellum.  -08/19 CTH: Acute parenchymal hemorrhage again seen swelling above. Extensive periventricular white matter lucency is identified and more conspicuous when compared with the prior exam.    -08/17 EEG SUMMARY/CLASSIFICATION  Abnormal EEG in a comatose patient  - LPDs - 1-3 Hz, right frontal-central region (F4 max / Fz)  - Occasional independent left frontal sharp wave discharges (F3 max)  - Intermittent runs of GPDs 0.5-1.5 Hz, at times with triphasic morphology.    - Moderate-severe generalized slowing.  - Irregular heart rhythm noted at times.  _____________________________________________________________  EEG IMPRESSION/CLINICAL CORRELATE  Abnormal EEG study.  - Potential independent epileptogenic foci in the bilateral frontal, central regions (right greater than left) superimposed over diffuse cortical hyperexcitability with GPDs, at times with triphasic morphology suggestive of severe metabolic encephalopathy but can be considered epileptogenic in the correct clinical context.   - Structural or functional abnormality in left > right frontal regions  - Moderate-severe nonspecific diffuse or multifocal cerebral dysfunction.     -08/18 EEG SUMMARY/CLASSIFICATION  Abnormal EEG in a comatose patient  - LPDs - 1-3 Hz, right frontal-central region (F4 max / Fz)  - Occasional independent left frontal sharp wave discharges (F3 max)  - Intermittent runs of GPDs 0.5-1.5 Hz, at times with triphasic morphology.    - Moderate-severe generalized slowing.  - Irregular heart rhythm noted at times.  _____________________________________________________________  EEG IMPRESSION/CLINICAL CORRELAT  Abnormal EEG study.  - Potential independent epileptogenic foci in the bilateral frontal, central regions (right greater than left) superimposed over diffuse cortical hyperexcitability with GPDs, at times with triphasic morphology suggestive of severe metabolic encephalopathy but can be considered epileptogenic in the correct clinical context.   - Structural or functional abnormality in left > right frontal regions  - Moderate-severe nonspecific diffuse or multifocal cerebral dysfunction.     -08/19 EEG SUMMARY/CLASSIFICATION  Abnormal EEG in a comatose patient  - LPDs - 1-3 Hz, right frontal-central region (F4 max / Fz)  - Previously seen left frontal sharp wave discharges are not noted in this record.  - Occasional runs of GPDs 0.5-1.5 Hz, at times with triphasic morphology.    - Moderate-severe generalized slowing, at times in burst suppression pattern.  - Irregular heart rhythm noted at times.  _____________________________________________________________  EEG IMPRESSION/CLINICAL CORRELATE  Abnormal EEG study.  - Potential independent epileptogenic foci in the right frontal, central regions superimposed over diffuse cortical hyperexcitability with GPDs, at times with triphasic morphology suggestive of severe metabolic encephalopathy but can be considered epileptogenic in the correct clinical context.   - Structural or functional abnormality in left > right frontal regions  - Moderate-severe nonspecific diffuse or multifocal cerebral dysfunction.   - No seizure seen x 3 days    -08/20 EEG SUMMARY/CLASSIFICATION  Abnormal EEG in a comatose patient  - LPDs - 1-3 Hz, right frontal-central region (F4 max / Fz)  - Previously seen left frontal sharp wave discharges are not noted in this record.  - Previously seen GPDs 0.5-1.5 Hz with triphasic morphology are not recorded in this study.  - Moderate-severe generalized slowing, at times with breif periods of suppression  _____________________________________________________________  EEG IMPRESSION/CLINICAL CORRELATE  Abnormal EEG study.  - Potential independent epileptogenic foci in the right frontal, central regions  - Relative improvement of background with resolution of GPDs and lessened cortical hyperexcitability  - Structural or functional abnormality in left > right frontal regions  - Moderate-severe nonspecific diffuse or multifocal cerebral dysfunction.   - No seizure seen x 4 days      EEG SUMMARY/CLASSIFICATION    Abnormal EEG in a comatose patient  - Moderate-severe generalized slowing, at times with breif periods of suppression

## 2021-08-24 NOTE — PROGRESS NOTE ADULT - ASSESSMENT
55 yo RH man with  chronic pain (takes Advil and Aleve) and ?EtOH abuse/substance abuse who presented as a transfer from [facility] on 8/13 for L BG hemorrhage.   MRI head shows L BG IPH + innumerable small acute infarcts within the watershed areas of the JEMIMA/MCA territories as well as the MCA/PCA territories bilaterally.   EEG wit no seizures but now with burst suppression  A1c 5.3, LDL 68   Impression: L BG hemorrhage with IVH likely 2/2 HTN vs alternate etiology; MRI head also shows innumerable bilateral watershed territory acute ischemic infarcts, likely secondary to aortic dissection.    8/22 - worsening exam, no spontaneous movement, no pupillary reflex, no gag, + corenals, + OCC, and eeg with burst suppresion off sedation >48hrs  - DNR/I no more escalation of care   - c/w keppra lowered to 750mg BID   - hold AC/AP  - lipitor 40 when able   - at this point given exam today which has progressed and eeg with burst suppression now off sedation for > 48hrs, patient has low likelihood of meaningful recovery. He like likely require trach/peg and 24hrs nursing care at this point and likely be bed bound vent dependent.    - would speak to family regarding GOC  - spoke with ICU team ACP  - BP parameters per ICU   - check FS, glucose control <180  - GI/DVT ppx  - Counseling on diet, exercise, and medication adherence was done  - Counseling on smoking cessation and alcohol consumption offered when appropriate.  - Pain assessed and judicious use of narcotics when appropriate was discussed.    - Stroke education given when appropriate.  - Importance of fall prevention discussed.   - Differential diagnosis and plan of care discussed with patient and/or family and primary team  - Thank you for allowing me to participate in the care of this patient. Call with questions.   - spoke with family bedside 8/23, now DNR/I no escalation of care   Carlos Sierra MD  Vascular Neurology

## 2021-08-26 LAB
CULTURE RESULTS: SIGNIFICANT CHANGE UP
CULTURE RESULTS: SIGNIFICANT CHANGE UP
SPECIMEN SOURCE: SIGNIFICANT CHANGE UP
SPECIMEN SOURCE: SIGNIFICANT CHANGE UP

## 2021-09-01 LAB — VASOPRESSIN SERPL-MCNC: 3.5 PG/ML — SIGNIFICANT CHANGE UP (ref 0–4.7)

## 2021-10-09 LAB
CULTURE RESULTS: SIGNIFICANT CHANGE UP
SPECIMEN SOURCE: SIGNIFICANT CHANGE UP

## 2022-02-23 NOTE — ED ADULT TRIAGE NOTE - HEIGHT IN FEET
Detail Level: Detailed Quality 130: Documentation Of Current Medications In The Medical Record: Current Medications Documented 5

## 2023-01-20 NOTE — OCCUPATIONAL THERAPY INITIAL EVALUATION ADULT - PRECAUTIONS/LIMITATIONS, REHAB EVAL
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 Pt denies any fall, syncope, trauma although per EMS reports patient had a fall and was found with word finding difficulty and right-sided weakness. At [place], CT noncon was done and demonstrates left basal ganglia hemorrhage. Patient subsequently transferred to [facility] ED, repeat imaging showed possible Type A aortic dissection. Cardiothoracic surgery consulted, CTA CAP showed Type A dissection from distal ascending aorta to distal arch. CT Angio/Chest Aorta (8/13) As on same day CT neck, type A aortic dissection from the distal ascending aorta to distal arch proximal to just beyond the left subclavian artery origin. No further extension into the thorax. 5.4 cm ascending aortic aneurysm, at the level of the proximal dissection flap. CTA Neck (8/13)  No stenosis. Acute dissection of the aortic arch without propagation of the dissection flap into the great vessels. CTA chest, abdomen, and pelvis is recommended to evaluate extent of the dissection flap. Clear

## 2025-07-31 NOTE — ED CLERICAL - NS ED CLERK UNITS
Anesthesia Pre Eval Note    Anesthesia ROS/Med Hx        Anesthetic Complication History:    Patient does not have a history of anesthetic complications      Pulmonary Review:  Patient does not have a pulmonary history      Neuro/Psych Review:  Patient does not have a neuro/psych history         Cardiovascular Review:   Exercise tolerance: good (>4 METS)  Positive for hypertension  Positive for hyperlipidemia    GI/HEPATIC/RENAL Review:  Patient does not have a GI/hepatic/renalhistory       End/Other Review:  Patient does not have an endo/other history    Additional Results:      ALLERGIES:  No Known Allergies   Last Labs        Component                Value               Date/Time                  WBC                      4.2                 06/18/2025 1059            RBC                      4.00                06/18/2025 1059            HGB                      12.3                06/18/2025 1059            HCT                      37.6                06/18/2025 1059            MCV                      94.0                06/18/2025 1059            MCH                      30.8                06/18/2025 1059            MCHC                     32.7                06/18/2025 1059            RDW-CV                   12.7                06/18/2025 1059            Sodium                   142                 06/18/2025 1059            Potassium                3.4                 06/18/2025 1059            Chloride                 107                 06/18/2025 1059            Carbon Dioxide           28                  06/18/2025 1059            Glucose                  98                  06/18/2025 1059            BUN                      10                  06/18/2025 1059            Creatinine               0.88                06/18/2025 1059            Glomerular Filtrati*     70                  06/18/2025 1059            Calcium                  9.5                 06/18/2025 1059            PLT                       146                 06/18/2025 1059        Past Medical History:  09/25/2021: Cataract      Comment:  Annual eye exam  03/01/2022: Essential (primary) hypertension  10/01/2021: High cholesterol  05/01/2022: Osteoporosis  05/2021: Vertigo  Past Surgical History:  03/31/2023: Oral surgery procedure; Right      Comment:  top  03/31/2023: Tooth extraction; Left      Comment:  top left  04/01/1987: Tubal ligation   Prior to Admission medications :  Medication alendronate (FOSAMAX) 70 MG tablet, Sig TAKE 1 TABLET BY MOUTH ONE TIME PER WEEK, Start Date 6/16/25, End Date , Taking? Yes, Authorizing Provider Sammy Robles MD    Medication atorvastatin (LIPITOR) 40 MG tablet, Sig TAKE 1 TABLET BY MOUTH EVERY DAY, Start Date 3/27/25, End Date , Taking? Yes, Authorizing Provider Sammy Robles MD    Medication amLODIPine (NORVASC) 2.5 MG tablet, Sig TAKE 1 TABLET BY MOUTH EVERY DAY  Patient taking differently: Take 2.5 mg by mouth daily (with dinner)., Start Date 11/1/24, End Date , Taking? Yes, Authorizing Provider Sammy Robles MD    Medication blood glucose (TULIO CONTOUR NEXT TEST) test strip, Sig Test blood sugar 3 times daily. Diagnosis: prediabetes. Meter:, Start Date 6/18/25, End Date , Taking? , Authorizing Provider Keyshawn Ramires FNP    Medication Lancets Misc, Sig Check blood glucose three times a day, Start Date 6/18/25, End Date , Taking? , Authorizing Provider Keyshawn Ramires FNP    Medication bisacodyl (DULCOLAX) 5 MG EC tablet, Sig TAKE TWO TAB BY MOUTH AFTER 7PM  Patient not taking: Reported on 6/18/2025, Start Date 5/28/25, End Date , Taking? , Authorizing Provider Ford Perez MD    Medication omeprazole (PriLOSEC) 40 MG capsule, Sig Take 1 capsule by mouth daily., Start Date 5/21/25, End Date 6/20/25, Taking? , Authorizing Provider Keyshawn Ramires FNP            Relevant Problems   No relevant active problems       Physical Exam     Airway   Mallampati: II  TM Distance: >3 FB  Neck  APER ROM: Full  Neck: Non-tender and Able to place in sniff position  TMJ Mobility: Good    Cardiovascular  Cardiovascular exam normal  Cardio Rhythm: Regular  Cardio Rate: Normal    Head Assessment  Head assessment: Normocephalic and Atraumatic    General Assessment  General Assessment: Alert and oriented and No acute distress    Dental Exam  Dental exam normal  Patient has:  Denied broken/chipped/loose teeth    Pulmonary Exam  Pulmonary exam normal  Breath sounds clear to auscultation:  Yes    Abdominal Exam  Abdominal exam normal      Vitals:   Patient Vitals for the past 12 hrs (Last 1 readings):   BP Temp Temp src Pulse Resp SpO2 Height Weight   07/31/25 0720 (!) 157/83 36.8 °C (98.2 °F) Oral 80 16 100 % 5' 2.5\" (1.588 m) 69.2 kg (152 lb 8.9 oz)         Anesthesia Plan:    ASA Status: 2  Anesthesia Type: MAC    Induction: Intravenous  Preferred Airway Type: Nasal Cannula    Post-op Pain Management: Per Surgeon      Checklist  Reviewed: Lab Results, EKG, Chest X-Rays, Nursing Notes, Patient Summary, Beta Blocker Status, Outside Records, Care Everywhere, DNR Status, NPO Status, Allergies, Medications, Problem list and Past Med History  Consent/Risks Discussed Statement:  The proposed anesthetic plan, including its risks and benefits, have been discussed with the Patient along with the risks and benefits of alternatives. Questions were encouraged and answered and the patient and/or representative understands and agrees to proceed.        I discussed with the patient (and/or patient's legal representative) the risks and benefits of the proposed anesthesia plan, MAC, which may include services performed by other anesthesia providers.    Alternative anesthesia plans, if available, were reviewed with the patient (and/or patient's legal representative). Discussion has been held with the patient (and/or patient's legal representative) regarding risks of anesthesia, which include Sore Throat, Dental Injury, Hypotension,  Allergic Reaction, Aspiration, Intra-operative Awareness and Emergence Delirium and emergent situations that may require change in anesthesia plan.    The patient (and/or patient's legal representative) has indicated understanding, his/her questions have been answered, and he/she wishes to proceed with the planned anesthetic.    Comments  Plan Comments: RECORDS, EKG AND CHEST X-RAYS WHEN AVAILABLE